# Patient Record
Sex: MALE | Race: WHITE | NOT HISPANIC OR LATINO | Employment: OTHER | URBAN - METROPOLITAN AREA
[De-identification: names, ages, dates, MRNs, and addresses within clinical notes are randomized per-mention and may not be internally consistent; named-entity substitution may affect disease eponyms.]

---

## 2017-02-02 DIAGNOSIS — K21.9 GASTROESOPHAGEAL REFLUX DISEASE WITHOUT ESOPHAGITIS: Primary | ICD-10-CM

## 2017-02-02 NOTE — TELEPHONE ENCOUNTER
pantoprazole (PROTONIX) 20 MG tablet      Last Written Prescription Date: 5-3-2016  Last Fill Quantity: 90,  # refills: 2   Last Office Visit with [facility], [facility] or [facility] prescribing provider: 11-                                         Next 5 appointments (look out 90 days)     Mar 17, 2017 11:40 AM   SHORT with Frannie Talavera MD   [facility]    [address] [place] [id number] [phone]

## 2017-02-03 RX ORDER — PANTOPRAZOLE SODIUM 20 MG/1
TABLET, DELAYED RELEASE ORAL
Qty: 90 TABLET | Refills: 0 | Status: SHIPPED | OUTPATIENT
Start: 2017-02-03 | End: 2017-03-17

## 2017-03-13 ENCOUNTER — RADIANT APPOINTMENT (OUTPATIENT)
Dept: CT IMAGING | Facility: CLINIC | Age: 72
End: 2017-03-13
Attending: FAMILY MEDICINE
Payer: COMMERCIAL

## 2017-03-13 DIAGNOSIS — Z87.891 HISTORY OF TOBACCO USE: ICD-10-CM

## 2017-03-13 PROCEDURE — G0297 LDCT FOR LUNG CA SCREEN: HCPCS | Mod: TC

## 2017-03-17 ENCOUNTER — OFFICE VISIT (OUTPATIENT)
Dept: FAMILY MEDICINE | Facility: CLINIC | Age: 72
End: 2017-03-17
Payer: COMMERCIAL

## 2017-03-17 VITALS
BODY MASS INDEX: 29.18 KG/M2 | HEIGHT: 69 IN | TEMPERATURE: 98 F | WEIGHT: 197 LBS | DIASTOLIC BLOOD PRESSURE: 76 MMHG | SYSTOLIC BLOOD PRESSURE: 136 MMHG | HEART RATE: 72 BPM

## 2017-03-17 DIAGNOSIS — K21.9 GASTROESOPHAGEAL REFLUX DISEASE WITHOUT ESOPHAGITIS: ICD-10-CM

## 2017-03-17 DIAGNOSIS — M47.22 OSTEOARTHRITIS OF SPINE WITH RADICULOPATHY, CERVICAL REGION: ICD-10-CM

## 2017-03-17 DIAGNOSIS — I10 HYPERTENSION GOAL BP (BLOOD PRESSURE) < 140/90: ICD-10-CM

## 2017-03-17 DIAGNOSIS — G47.30 SLEEP APNEA, UNSPECIFIED TYPE: Primary | ICD-10-CM

## 2017-03-17 DIAGNOSIS — M47.812 SPONDYLOSIS OF CERVICAL REGION WITHOUT MYELOPATHY OR RADICULOPATHY: ICD-10-CM

## 2017-03-17 DIAGNOSIS — Z13.6 CARDIOVASCULAR SCREENING; LDL GOAL LESS THAN 160: ICD-10-CM

## 2017-03-17 PROCEDURE — 99214 OFFICE O/P EST MOD 30 MIN: CPT | Performed by: FAMILY MEDICINE

## 2017-03-17 PROCEDURE — 80048 BASIC METABOLIC PNL TOTAL CA: CPT | Performed by: FAMILY MEDICINE

## 2017-03-17 PROCEDURE — 80061 LIPID PANEL: CPT | Performed by: FAMILY MEDICINE

## 2017-03-17 PROCEDURE — 36415 COLL VENOUS BLD VENIPUNCTURE: CPT | Performed by: FAMILY MEDICINE

## 2017-03-17 RX ORDER — PANTOPRAZOLE SODIUM 20 MG/1
TABLET, DELAYED RELEASE ORAL
Qty: 90 TABLET | Refills: 3 | Status: SHIPPED | OUTPATIENT
Start: 2017-03-17 | End: 2017-05-01 | Stop reason: ALTCHOICE

## 2017-03-17 NOTE — MR AVS SNAPSHOT
After Visit Summary   3/17/2017    Garrett [place]    MRN: [id number]           Patient Information     Date Of Birth          1945        Visit Information        Provider Department      3/17/2017 11:40 AM Frannie Talavera MD [facility]        Today's Diagnoses     Sleep apnea, unspecified type    -  1    Hypertension goal BP (blood pressure) < 140/90        Spondylosis of cervical region without myelopathy or radiculopathy        CARDIOVASCULAR SCREENING; LDL GOAL LESS THAN 160          Care Instructions    Please schedule an appointment with ENT Dr. Diamond to discuss surgical options for sleep apnea management.    I would agree with using the naproxen when your neck symptoms flare, and then trying to wean off of it between flares.    Please let me know if you would like to see our chiropractor at our [place] office.    [facility]   Discharged by : Cuca UREÑA, Certified Medical Assistant (AAMA)March 17, 2017 12:48 PM    Paper scripts provided to patient : none   If you have any questions regarding to your visit please contact your care team:   Team Gold Clinic Hours Telephone Number   Dr. Cuca Bradford PA-C   7am-7pm Monday - Thursday   7am-5pm Fridays  [phone]   (Appointment scheduling available 24/7)   RN Line   [phone] option 2     What options do I have for visits at the clinic other than the traditional office visit?   To expand how we care for you, many of our providers are utilizing electronic visits (e-visits) and telephone visits, when medically appropriate, for interactions with their patients rather than a visit in the clinic. We also offer nurse visits for many medical concerns. Just like any other service, we will bill your insurance company for this type of visit based on time spent on the phone with your provider. Not all insurance companies cover these visits. Please check  with your medical insurance if this type of visit is covered. You will be responsible for any charges that are not paid by your insurance.   E-visits via OrthobondharInvenSense: generally incur a $35.00 fee.   Telephone visits:   Time spent on the phone: *charged based on time that is spent on the phone in increments of 10 minutes. Estimated cost:   5-10 mins $30.00   11-20 mins. $59.00   21-30 mins. $85.00   Use AccessPay (secure email communication and access to your chart) to send your primary care provider a message or make an appointment. Ask someone on your Team how to sign up for AccessPay.   For a Price Quote for your services, please call our MadeiraCloud Line at [phone].   As always, Thank you for trusting us with your health care needs!                    [facility]:    Scheduling Appointments  [facility]  Call: [phone]    [facility]  Call: [phone]    [facility]  Call: [phone]    WHERE TO GO FOR CARE?    Clinic    Make an appointment if you:       Are sick (cold, cough, flu, sore throat, earache or in pain).       Have a small injury (sprain, small cut, burn or broken bone).       Need a physical exam, Pap smear, vaccine or prescription refill.       Have questions about your health or medicines.    To reach us:      Call [phone] ([phone]). Open 24 hours every day. (For counseling services, call [phone].)    Log into AccessPay at [email or web address]. (Visit [email or web address] to create an account.) Hospital emergency room    An emergency is a serious or life- threatening problem that must be treated right away.    Call [phone] or get to the hospital if you have:      Very bad or sudden:            - Chest pain or pressure         - Bleeding         - Head or belly pain         - Dizziness or trouble seeing, walking or                          Speaking      Problems breathing      Blood in  your vomit or you are coughing up blood      A major injury (knocked out, loss of a finger or limb, rape, broken bone protruding from skin)    A mental health crisis. (Or call the Mental Health Crisis line at 1-468.849.1146 or Suicide Prevention Hotline at 1-357.757.7417.)    Open 24 hours every day. You don't need an appointment.     Urgent care    Visit urgent care for sickness or small injuries when the clinic is closed. You don't need an appointment. To check hours or find an urgent care near you, visit [email or web address]. Online care    Get online care from [employer] for more than 70 common problems, like colds, allergies and infections. Open 24 hours every day at: [email or web address]   Need help deciding?    For advice about where to be seen, you may call your clinic and ask to speak with a nurse. We're here for you 24 hours every day.         If you are deaf or hard of hearing, please let us know. We provide many free services including sign language interpreters, oral interpreters, TTYs, telephone amplifiers, note takers and written materials.               Follow-ups after your visit        Additional Services     OTOLARYNGOLOGY REFERRAL       Your provider has referred you to: [facility]: Adult [facility] N[facility] Throat Clinic (Otolaryngology) - [place] [phone]  [email or web address]  Dr. Diamond    Please be aware that coverage of these services is subject to the terms and limitations of your health insurance plan.  Call member services at your health plan with any benefit or coverage questions.      Please bring the following with you to your appointment:    (1) Any X-Rays, CTs or MRIs which have been performed.  Contact the facility where they were done to arrange for  prior to your scheduled appointment.   (2) List of current medications  (3) This referral request   (4) Any documents/labs given to you for this referral                  Who to  "contact     If you have questions or need follow up information about today's clinic visit or your schedule please contact [facility] directly at [phone].  Normal or non-critical lab and imaging results will be communicated to you by MyChart, letter or phone within 4 business days after the clinic has received the results. If you do not hear from us within 7 days, please contact the clinic through MyChart or phone. If you have a critical or abnormal lab result, we will notify you by phone as soon as possible.  Submit refill requests through Avaz or call your pharmacy and they will forward the refill request to us. Please allow 3 business days for your refill to be completed.          Additional Information About Your Visit        Avaz Information     Avaz gives you secure access to your electronic health record. If you see a primary care provider, you can also send messages to your care team and make appointments. If you have questions, please call your primary care clinic.  If you do not have a primary care provider, please call [phone] and they will assist you.        Care EveryWhere ID     This is your Care EveryWhere ID. This could be used by other organizations to access your [facility] medical records  [id number]        Your Vitals Were     Pulse Temperature Height BMI (Body Mass Index)          72 98  F (36.7  C) (Oral) 5' 9\" (1.753 m) 29.09 kg/m2         Blood Pressure from Last 3 Encounters:   03/17/17 136/76   11/23/16 130/72   08/05/16 128/74    Weight from Last 3 Encounters:   03/17/17 197 lb (89.4 kg)   11/23/16 196 lb (88.9 kg)   08/05/16 190 lb (86.2 kg)              We Performed the Following     BASIC METABOLIC PANEL     Lipid panel reflex to direct LDL     OTOLARYNGOLOGY REFERRAL        Primary Care Provider Office Phone # Fax #    Frannie Talavera [phone]       [facility] [address] [place] [address]      "   Thank you!     Thank you for choosing [facility]  for your care. Our goal is always to provide you with excellent care. Hearing back from our patients is one way we can continue to improve our services. Please take a few minutes to complete the written survey that you may receive in the mail after your visit with us. Thank you!             Your Updated Medication List - Protect others around you: Learn how to safely use, store and throw away your medicines at www.disposemymeds.org.          This list is accurate as of: 3/17/17 12:48 PM.  Always use your most recent med list.                   Brand Name Dispense Instructions for use    * aspirin 81 MG tablet      Take 1 tablet by mouth At Bedtime       * ASPIRIN PO      Take 325 mg by mouth daily 2 tablets daily for arthritis       Calcium-Magnesium-Zinc-D3 Tabs      Take 1 tablet by mouth 2 times daily       EPINEPHrine 0.3 MG/0.3ML injection    EPIPEN 2-NETO    2 each    Inject 0.3 mLs into the muscle once as needed for anaphylaxis for 1 dose.       ibuprofen 800 MG tablet    ADVIL/MOTRIN     Take 800 mg by mouth every 8 hours as needed.       naproxen 375 MG tablet    NAPROSYN    180 tablet    Take 1 tablet (375 mg) by mouth 2 times daily (with meals)       pantoprazole 20 MG EC tablet    PROTONIX    90 tablet    Take by mouth 30-60 minutes before a meal.       RESVERATROL PO      Take by mouth daily       VITAMIN C CR PO      Take  by mouth.       * Notice:  This list has 2 medication(s) that are the same as other medications prescribed for you. Read the directions carefully, and ask your doctor or other care provider to review them with you.

## 2017-03-17 NOTE — PATIENT INSTRUCTIONS
Please schedule an appointment with ENT Dr. Diamond to discuss surgical options for sleep apnea management.    I would agree with using the naproxen when your neck symptoms flare, and then trying to wean off of it between flares.    Please let me know if you would like to see our chiropractor at our [place] office.    [facility]   Discharged by : Cuca UREÑA, Certified Medical Assistant (AAMA)March 17, 2017 12:48 PM    Paper scripts provided to patient : none   If you have any questions regarding to your visit please contact your care team:   Team Gold Clinic Hours Telephone Number   Dr. Cuca Bradford, JOANN   7am-7pm Monday - Thursday   7am-5pm Fridays  [phone]   (Appointment scheduling available 24/7)   RN Line   [phone] option 2     What options do I have for visits at the clinic other than the traditional office visit?   To expand how we care for you, many of our providers are utilizing electronic visits (e-visits) and telephone visits, when medically appropriate, for interactions with their patients rather than a visit in the clinic. We also offer nurse visits for many medical concerns. Just like any other service, we will bill your insurance company for this type of visit based on time spent on the phone with your provider. Not all insurance companies cover these visits. Please check with your medical insurance if this type of visit is covered. You will be responsible for any charges that are not paid by your insurance.   E-visits via scanR: generally incur a $35.00 fee.   Telephone visits:   Time spent on the phone: *charged based on time that is spent on the phone in increments of 10 minutes. Estimated cost:   5-10 mins $30.00   11-20 mins. $59.00   21-30 mins. $85.00   Use scanR (secure email communication and access to your chart) to send your primary care provider a message or make an appointment. Ask someone on your Team how to  sign up for [employer].   For a Price Quote for your services, please call our Consumer Price Line at [phone].   As always, Thank you for trusting us with your health care needs!                    [facility]:    Scheduling Appointments  [facility]  Call: [phone]    [facility]  Call: [phone]    [facility]  Call: [phone]    WHERE TO GO FOR CARE?    Clinic    Make an appointment if you:       Are sick (cold, cough, flu, sore throat, earache or in pain).       Have a small injury (sprain, small cut, burn or broken bone).       Need a physical exam, Pap smear, vaccine or prescription refill.       Have questions about your health or medicines.    To reach us:      Call [phone] ([phone]). Open 24 hours every day. (For counseling services, call [phone].)    Log into [employer] at LaTherm. (Visit Odyssey Thera.Argos Risk to create an account.) Hospital emergency room    An emergency is a serious or life- threatening problem that must be treated right away.    Call [phone] or get to the hospital if you have:      Very bad or sudden:            - Chest pain or pressure         - Bleeding         - Head or belly pain         - Dizziness or trouble seeing, walking or                          Speaking      Problems breathing      Blood in your vomit or you are coughing up blood      A major injury (knocked out, loss of a finger or limb, rape, broken bone protruding from skin)    A mental health crisis. (Or call the Mental Health Crisis line at 1-524.566.7630 or Suicide Prevention Hotline at 1-143.544.9254.)    Open 24 hours every day. You don't need an appointment.     Urgent care    Visit urgent care for sickness or small injuries when the clinic is closed. You don't need an appointment. To check hours or find an urgent care near you, visit [email or web address]. Online care    Get online care from  [facility] for more than 70 common problems, like colds, allergies and infections. Open 24 hours every day at: [email or web address]   Need help deciding?    For advice about where to be seen, you may call your clinic and ask to speak with a nurse. We're here for you 24 hours every day.         If you are deaf or hard of hearing, please let us know. We provide many free services including sign language interpreters, oral interpreters, TTYs, telephone amplifiers, note takers and written materials.

## 2017-03-17 NOTE — NURSING NOTE
"Chief Complaint   Patient presents with     Follow Up For     spine arthritis     Due for Health Maintenance       Initial /76 (BP Location: Left arm, Patient Position: Chair, Cuff Size: Adult Large)  Pulse 72  Temp 98  F (36.7  C) (Oral)  Ht 5' 9\" (1.753 m)  Wt 197 lb (89.4 kg)  BMI 29.09 kg/m2 Estimated body mass index is 29.09 kg/(m^2) as calculated from the following:    Height as of this encounter: 5' 9\" (1.753 m).    Weight as of this encounter: 197 lb (89.4 kg).  Medication Reconciliation: complete   Barbra Prado MA      "

## 2017-03-17 NOTE — PROGRESS NOTES
"  SUBJECTIVE:                                                    Garrett Del [place] is a 71 year old male who presents to clinic today for the following health issues:      Hypertension Follow-up      Outpatient blood pressures are being checked at home.  Results are \"not so good lately\" All over the board. Last couple days in AM after breakfast 162. And an hour later about 126. He reports that the meter at home reads 10 higher.    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 Degenerative changes in the posterior apophyseal joints  bilaterally in the mid and lower cervical spine. Calcification of the  ligamentum nuchae. No acute findings.     Saw Sanchez Kent 11/23/16 for neck pain with occasional tension headaches. X-ray done, see above, and patient referred to PT, which he completed 12/2016. Patient says PT didn't help. Sanchez prescribed him 30 tablets of naproxen 375 mg bid which he is still taking, with relief.    He has also gone to a chiropractor, prior to being seen by Sanchez, and states \"he pretty much fixed it.\" Has not had significant pain in his neck since his chiropractic adjustments. Patient reports before his neck pain began he was dealing with lightheadedness when he got up in the AM at times, \"it felt like I was pinching a nerve,\" and he thought this was connected to his neck issue. His chiropractor fixed this as well but it has started to come back slightly, not daily. The light headedness lasts for about 10-15 minutes and then subsides.     Fatigue  Wife is still concerned " "about his fatigue, although we have not been able to determine an explanation for his fatigue other than sleep apnea. His cpap machine is bothersome to him such that he feels it wakes him up at night and prevents good sleep. 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 He has a CPAP that he uses nightly which he says does impact his sleep and he still wakes up feeling tired. His machine was last interrogated a year ago. Discussed surgical treatments for ADOLFO and patient would like a referral to discuss these.    Due for labs, not fasting.   Chest CT 3/13/17 was unremarkable and recommended repeat scan in 12 months. Patient quit smoking 20+ years ago.    Problem list and histories reviewed & adjusted, as indicated.  Additional history: as documented    Patient Active Problem List   Diagnosis     GERD (gastroesophageal reflux disease)     Esophageal spasms     CARDIOVASCULAR SCREENING; LDL GOAL LESS THAN 160     Hypertension goal BP (blood pressure) < 140/90     MEDIAL MENISCUS TEAR - left     OA (OSTEOARTHRITIS) OF KNEE - left     Prostate cancer screening     Sleep apnea     Kidney stone     Mantoux: positive     Advanced directives, counseling/discussion     Hiatal hernia     Colon polyp     Past Surgical History   Procedure Laterality Date     No history of surgery       Cardiac stress tst,complete  2006     Hc knee scope,med/lat menisectomy  11/11/11     left, with partial medial menisectomy ONLY       Social History   Substance Use Topics     Smoking status: Former Smoker     Packs/day: 1.00     Years: 35.00     Types: Cigarettes, Cigars, Pipe     Start date: 6/6/1963     Quit date: 10/13/1998     Smokeless tobacco: Never Used     Alcohol use Yes      Comment: about a beer a week      Family History   Problem Relation Age of Onset     Prostate Cancer Father      C.A.D. Father      DIABETES Paternal Grandfather      Hypertension No family hx of      CANCER No family " "hx of          Current Outpatient Prescriptions   Medication Sig Dispense Refill     RESVERATROL PO Take by mouth daily       pantoprazole (PROTONIX) 20 MG EC tablet Take by mouth 30-60 minutes before a meal. 90 tablet 0     naproxen (NAPROSYN) 375 MG tablet Take 1 tablet (375 mg) by mouth 2 times daily (with meals) 180 tablet 0     ASPIRIN PO Take 325 mg by mouth daily 2 tablets daily for arthritis       Multiple Minerals-Vitamins (CALCIUM-MAGNESIUM-ZINC-D3) TABS Take 1 tablet by mouth 2 times daily       ibuprofen (ADVIL,MOTRIN) 800 MG tablet Take 800 mg by mouth every 8 hours as needed.       EPINEPHrine (EPIPEN 2-NETO) 0.3 MG/0.3ML injection Inject 0.3 mLs into the muscle once as needed for anaphylaxis for 1 dose. 2 each 3     Ascorbic Acid (VITAMIN C CR PO) Take  by mouth.       aspirin 81 MG tablet Take 1 tablet by mouth At Bedtime        Allergies   Allergen Reactions     Septra [Sulfamethoxazole W-Trimethoprim]        Reviewed and updated as needed this visit by clinical staff  Tobacco  Allergies  Meds  Med Hx  Surg Hx  Fam Hx  Soc Hx      Reviewed and updated as needed this visit by Provider         ROS:  Constitutional, HEENT, cardiovascular, pulmonary, gi and gu systems are negative, except as otherwise noted.    This document serves as a record of the services and decisions personally performed by Frannie Talavera MD. It was created on his/her behalf by Noemi Mata, a trained medical scribe. The creation of this document is based on the provider's statements to the medical scribe. Noemi Mata March 17, 2017 11:55 AM  OBJECTIVE:                                                    /76 (BP Location: Left arm, Patient Position: Chair, Cuff Size: Adult Large)  Pulse 72  Temp 98  F (36.7  C) (Oral)  Ht 5' 9\" (1.753 m)  Wt 197 lb (89.4 kg)  BMI 29.09 kg/m2  Body mass index is 29.09 kg/(m^2).  GENERAL: healthy, alert and no distress  RESP: lungs clear to auscultation - no rales, rhonchi or " wheezes  CV: regular rate and rhythm, normal S1 S2, no S3 or S4, no murmur, click or rub, no peripheral edema and peripheral pulses strong  PSYCH: mentation appears normal, affect normal/bright    Diagnostic Test Results:  No results found for this or any previous visit (from the past 24 hour(s)).     ASSESSMENT/PLAN:                                                    (G47.30) Sleep apnea, unspecified type  (primary encounter diagnosis)  Comment: Not tolerating CPAP well. Interested in other treatments.   Plan: OTOLARYNGOLOGY REFERRAL        F/u with ENT    (I10) Hypertension goal BP (blood pressure) < 140/90  Comment: Well controlled  Plan: BASIC METABOLIC PANEL        Continue current medications.    (M47.812) Spondylosis of cervical region without myelopathy or radiculopathy  Comment: Pain is controlled with naproxen and chiropractic adjustments  Plan: Refills provided. Advised he try to wean off of naproxen between flares and continue chiropractic care    (Z13.6) CARDIOVASCULAR SCREENING; LDL GOAL LESS THAN 160  Comment: Not fasting  Plan: Lipid panel reflex to direct LDL        Adjust treatment based on labs.      Patient Instructions   Please schedule an appointment with ENT Dr. Diamond to discuss surgical options for sleep apnea management.    I would agree with using the naproxen when your neck symptoms flare, and then trying to wean off of it between flares.    Please let me know if you would like to see our chiropractor at our [place] office.      The information in this document, created by the medical scribанна Mata for me, accurately reflects the services I personally performed and the decisions made by me. I have reviewed and approved this document for accuracy prior to leaving the patient care area.    Frannie Talavera MD  [facility]

## 2017-03-18 LAB
ANION GAP SERPL CALCULATED.3IONS-SCNC: 7 MMOL/L (ref 3–14)
BUN SERPL-MCNC: 11 MG/DL (ref 7–30)
CALCIUM SERPL-MCNC: 9.6 MG/DL (ref 8.5–10.1)
CHLORIDE SERPL-SCNC: 104 MMOL/L (ref 94–109)
CHOLEST SERPL-MCNC: 175 MG/DL
CO2 SERPL-SCNC: 29 MMOL/L (ref 20–32)
CREAT SERPL-MCNC: 1.03 MG/DL (ref 0.66–1.25)
GFR SERPL CREATININE-BSD FRML MDRD: 71 ML/MIN/1.7M2
GLUCOSE SERPL-MCNC: 76 MG/DL (ref 70–99)
HDLC SERPL-MCNC: 28 MG/DL
LDLC SERPL CALC-MCNC: 78 MG/DL
NONHDLC SERPL-MCNC: 147 MG/DL
POTASSIUM SERPL-SCNC: 4.9 MMOL/L (ref 3.4–5.3)
SODIUM SERPL-SCNC: 140 MMOL/L (ref 133–144)
TRIGL SERPL-MCNC: 343 MG/DL

## 2017-05-01 ENCOUNTER — TELEPHONE (OUTPATIENT)
Dept: FAMILY MEDICINE | Facility: CLINIC | Age: 72
End: 2017-05-01

## 2017-05-01 DIAGNOSIS — K21.9 GASTROESOPHAGEAL REFLUX DISEASE WITHOUT ESOPHAGITIS: Primary | ICD-10-CM

## 2017-05-01 NOTE — TELEPHONE ENCOUNTER
Relayed message, he will see if it is effective & let us know if it doesn't help.   Lindsey Grigsby RN

## 2017-05-01 NOTE — TELEPHONE ENCOUNTER
We were using protonix because other medications did not work.  So [facility] but my bigger concern is that this has been a change.  So I would be quick to suggest follow up if prilosec doesn't help.

## 2017-05-01 NOTE — TELEPHONE ENCOUNTER
Reason for Call:  Medication or medication refill:    Do you use a [facility]?  Name of the pharmacy and phone number for the current request:  [facility], pended    Name of the medication requested: acid reflux    Other request: patients pantoprazole (PROTONIX) 20 MG is not longer effective in helping with the acid reflux.  Would like provider to contact insurance to approve something that has worked in the past for him: Pravised or Prilosec.  Please call to advise.    Can we leave a detailed message on this number? YES    Phone number patient can be reached at: Cell number on file:    Telephone Information:   Mobile [phone]       Best Time: any time before 1:45, is a  and cannot answer calls when driving.    Call taken on 5/1/2017 at 10:17 AM by Marissa Hernandez

## 2017-05-01 NOTE — TELEPHONE ENCOUNTER
Seen 3/27 but patient did not note this complaint. What type of visit would you prefer?  Lindsey Grigsby RN

## 2017-06-07 ENCOUNTER — TELEPHONE (OUTPATIENT)
Dept: FAMILY MEDICINE | Facility: CLINIC | Age: 72
End: 2017-06-07

## 2017-06-07 ENCOUNTER — OFFICE VISIT (OUTPATIENT)
Dept: FAMILY MEDICINE | Facility: CLINIC | Age: 72
End: 2017-06-07
Payer: COMMERCIAL

## 2017-06-07 VITALS
TEMPERATURE: 98.2 F | DIASTOLIC BLOOD PRESSURE: 70 MMHG | BODY MASS INDEX: 28.58 KG/M2 | HEART RATE: 88 BPM | HEIGHT: 69 IN | WEIGHT: 193 LBS | SYSTOLIC BLOOD PRESSURE: 138 MMHG

## 2017-06-07 DIAGNOSIS — R10.13 ABDOMINAL PAIN, EPIGASTRIC: Primary | ICD-10-CM

## 2017-06-07 DIAGNOSIS — K21.9 GASTROESOPHAGEAL REFLUX DISEASE WITHOUT ESOPHAGITIS: ICD-10-CM

## 2017-06-07 DIAGNOSIS — K22.70 BARRETT'S ESOPHAGUS WITHOUT DYSPLASIA: ICD-10-CM

## 2017-06-07 LAB
ALBUMIN SERPL-MCNC: 4.3 G/DL (ref 3.4–5)
ALP SERPL-CCNC: 65 U/L (ref 40–150)
ALT SERPL W P-5'-P-CCNC: 69 U/L (ref 0–70)
ANION GAP SERPL CALCULATED.3IONS-SCNC: 5 MMOL/L (ref 3–14)
AST SERPL W P-5'-P-CCNC: 46 U/L (ref 0–45)
BILIRUB SERPL-MCNC: 1.1 MG/DL (ref 0.2–1.3)
BUN SERPL-MCNC: 12 MG/DL (ref 7–30)
CALCIUM SERPL-MCNC: 9.3 MG/DL (ref 8.5–10.1)
CHLORIDE SERPL-SCNC: 104 MMOL/L (ref 94–109)
CO2 SERPL-SCNC: 30 MMOL/L (ref 20–32)
CREAT SERPL-MCNC: 0.94 MG/DL (ref 0.66–1.25)
ERYTHROCYTE [DISTWIDTH] IN BLOOD BY AUTOMATED COUNT: 13.8 % (ref 10–15)
GFR SERPL CREATININE-BSD FRML MDRD: 79 ML/MIN/1.7M2
GLUCOSE SERPL-MCNC: 121 MG/DL (ref 70–99)
HCT VFR BLD AUTO: 44.5 % (ref 40–53)
HGB BLD-MCNC: 15.7 G/DL (ref 13.3–17.7)
MCH RBC QN AUTO: 31.5 PG (ref 26.5–33)
MCHC RBC AUTO-ENTMCNC: 35.3 G/DL (ref 31.5–36.5)
MCV RBC AUTO: 89 FL (ref 78–100)
PLATELET # BLD AUTO: 173 10E9/L (ref 150–450)
POTASSIUM SERPL-SCNC: 4.6 MMOL/L (ref 3.4–5.3)
PROT SERPL-MCNC: 7.9 G/DL (ref 6.8–8.8)
RBC # BLD AUTO: 4.99 10E12/L (ref 4.4–5.9)
SODIUM SERPL-SCNC: 139 MMOL/L (ref 133–144)
WBC # BLD AUTO: 8.7 10E9/L (ref 4–11)

## 2017-06-07 PROCEDURE — 85027 COMPLETE CBC AUTOMATED: CPT | Performed by: FAMILY MEDICINE

## 2017-06-07 PROCEDURE — 36415 COLL VENOUS BLD VENIPUNCTURE: CPT | Performed by: FAMILY MEDICINE

## 2017-06-07 PROCEDURE — 80053 COMPREHEN METABOLIC PANEL: CPT | Performed by: FAMILY MEDICINE

## 2017-06-07 PROCEDURE — 99214 OFFICE O/P EST MOD 30 MIN: CPT | Performed by: FAMILY MEDICINE

## 2017-06-07 NOTE — NURSING NOTE
"Chief Complaint   Patient presents with     Abdominal Pain       Initial /70 (Cuff Size: Adult Regular)  Pulse 88  Temp 98.2  F (36.8  C) (Oral)  Ht 5' 9\" (1.753 m)  Wt 193 lb (87.5 kg)  BMI 28.5 kg/m2 Estimated body mass index is 28.5 kg/(m^2) as calculated from the following:    Height as of this encounter: 5' 9\" (1.753 m).    Weight as of this encounter: 193 lb (87.5 kg).  Medication Reconciliation: complete   Magaly Cha, Certified Medical Assistant (AAMA)     "

## 2017-06-07 NOTE — PROGRESS NOTES
"  SUBJECTIVE:                                                    Garrett Del Rio is a 71 year old male who presents to clinic today for the following health issues:      ABDOMINAL PAIN     Onset: 4-5 days    Description:   Character: Cramping  Location: epigastric region  Radiation: out towards sides    Intensity: moderate, severe    Progression of Symptoms:  worsening and intermittent    Accompanying Signs & Symptoms:  Fever/Chills?: no   Gas/Bloating: YES  Nausea: YES, mild symptoms  Vomitting: no   Diarrhea?: YES, somewhat   Constipation:no   Dysuria or Hematuria: no    History:   Trauma: no   Previous similar pain: no    Previous tests done: Upper Endoscopy    Precipitating factors:   Does the pain change with:     Food: no      BM: no     Urination: no     Alleviating factors:  rest    Therapies Tried and outcome: none    LMP:  not applicable     Discomfort primarily localized to epigastric region without radiation to the chest. Additionally, he notes worsening symptoms since onset. Denies dysphagia or worsening symptoms with exertion or positional changes.Denies lightheadedness, dizziness, or shortness of breath. Likewise, denies melena or hematochezia. Present symptoms not recrudescent of past episode of kidney stones. He does state that his omeprazole was changed three weeks ago from pantoprazole. Patient doesn't take NSAID's, except for aspiring 81 mg. No other OTC medications. He did have an abdominal US done about a month ago - there were no concerning findings at that time. He does still have his gallbladder. No dysuria or hematuria. No recent abx use. 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     Increase omeprazole twice a day. Repeat upper endoscopy would also be worthwhile given indication of possible Barretts fronm previous endoscopy.     Past/recent records reviewed and discussed for -- medications.          Problem " "list and histories reviewed & adjusted, as indicated.  Additional history: as documented    Labs reviewed in EPIC    Reviewed and updated as needed this visit by clinical staff  Tobacco  Allergies  Med Hx  Surg Hx  Fam Hx  Soc Hx      Reviewed and updated as needed this visit by Provider         ROS:  Constitutional, HEENT, cardiovascular, pulmonary, GI, , musculoskeletal, neuro, skin, endocrine and psych systems are negative, except as otherwise noted.    This document serves as a record of the services and decisions personally performed and made by Bal Echeverria MD. It was created on their behalf by Julio C Magaña, a trained medical scribe. The creation of this document is based the provider's statements to the medical scribe.  Julio C Magaña June 7, 2017 12:36 PM         OBJECTIVE:                                                    /70 (Cuff Size: Adult Regular)  Pulse 88  Temp 98.2  F (36.8  C) (Oral)  Ht 1.753 m (5' 9\")  Wt 87.5 kg (193 lb)  BMI 28.5 kg/m2  Body mass index is 28.5 kg/(m^2).  GENERAL: healthy, alert and no distress  HENT: ear canals and TM's normal, nose and mouth without ulcers or lesions  NECK: no adenopathy, no asymmetry, masses, or scars and thyroid normal to palpation  RESP: lungs clear to auscultation - no rales, rhonchi or wheezes  CV: regular rate and rhythm, normal S1 S2, no S3 or S4, no murmur, click or rub, no peripheral edema   ABDOMEN: soft, no hepatosplenomegaly, no masses and bowel sounds normal -- mild epigastric tenderness  MS: no gross musculoskeletal defects noted, no edema  SKIN: no suspicious lesions or rashes  PSYCH: mentation appears normal, affect normal/bright    Diagnostic Test Results:  none      ASSESSMENT/PLAN:                                                    (R10.13) Abdominal pain, epigastric  (primary encounter diagnosis)  Comment: he had mild epigastric tenderness, which improved with GI cocktail - I'll treat as GERD. He did have an upper endoscopy " done in 2013, which showed evidence of gastric polyp, small hiatal hernia, and possible Barretts  Plan: lidocaine 15 mL, alum & mag         hydroxide-simethicone 15 mL GI Cocktail,         Comprehensive metabolic panel (BMP + Alb, Alk         Phos, ALT, AST, Total. Bili, TP), CBC with         platelets, H Pylori antigen, stool,         GASTROENTEROLOGY ADULT REF PROCEDURE ONLY        -take omeprazole BID        -repeat upper endoscopy due to possible Barretts    (K21.9) Gastroesophageal reflux disease without esophagitis  Comment: see comments above  Plan: Comprehensive metabolic panel (BMP + Alb, Alk         Phos, ALT, AST, Total. 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 Bili, TP)     CBC with platelets     Last Lab Result: Hemoglobin (g/dL)       Date                     Value                 08/05/2016               15.9             ----------     H Pylori antigen, stool     Standing Status:   Future     Standing Expiration Date:   7/7/2017     GASTROENTEROLOGY ADULT REF PROCEDURE ONLY     lidocaine 15 mL, alum & mag hydroxide-simethicone 15 mL GI Cocktail     Sig: Take 30 mLs by mouth once for 1 dose     Dispense:  30 mL     Refill:  0     Increase Prilosec 2 times per day    [facility]:  [phone]          The information in this document, created by the medical scribe for me, accurately reflects the services I personally performed and the decisions made by me. I have reviewed and approved this document for accuracy prior to  leaving the patient care area.    James Echeverria MD, MD  [facility]

## 2017-06-07 NOTE — PATIENT INSTRUCTIONS
Orders Placed This Encounter     Comprehensive metabolic panel (BMP + Alb, Alk Phos, ALT, AST, Total. Bili, TP)     CBC with platelets     Last Lab Result: Hemoglobin (g/dL)       Date                     Value                 08/05/2016               15.9             ----------     H Pylori antigen, stool     Standing Status:   Future     Standing Expiration Date:   7/7/2017     GASTROENTEROLOGY ADULT REF PROCEDURE ONLY     lidocaine 15 mL, alum & mag hydroxide-simethicone 15 mL GI Cocktail     Sig: Take 30 mLs by mouth once for 1 dose     Dispense:  30 mL     Refill:  0     Increase Prilosec 2 times per day    [facility]:  [phone]

## 2017-06-07 NOTE — TELEPHONE ENCOUNTER
Spoke with patient. He states that he has been having GERD symptoms for the last 3 days. He would like to see a provider today.  Is currently taking omeprazole.  Appointment scheduled with Dr. Echeverria at 12:20 today.  Joe Singleton RN

## 2017-06-07 NOTE — TELEPHONE ENCOUNTER
Reason for Call:  Other appointment    Detailed comments: Patient would like to be seen with primary doctor for Gerd. Stated that his is having severe stomach pain    Phone Number Patient can be reached at: Cell number on file:    Telephone Information:   Mobile [phone]       Best Time:     Can we leave a detailed message on this number? Not Applicable    Call taken on 6/7/2017 at 9:30 AM by Kayce Daniel

## 2017-06-07 NOTE — NURSING NOTE
Gave patient GI Cocktail at 1:00pm.  Tolerated it well.    Susan Grigsby CMA    lidocaine 15 mL (lot: [id number], exp: 02/2020, ND: [id number] / [facility]))   alum & mag hydroxide-simethicone 15 mL GI Cocktail ([id number], 07/2017, [id number] / [facility])

## 2017-06-07 NOTE — MR AVS SNAPSHOT
After Visit Summary   6/7/2017    Garrett Del Rio    MRN: [id number]           Patient Information     Date Of Birth          1945        Visit Information        Provider Department      6/7/2017 12:20 PM James Echeverria MD [facility]        Today's Diagnoses     Abdominal pain, epigastric    -  1    Gastroesophageal reflux disease without esophagitis        Buck's esophagus without dysplasia          Care Instructions    Orders Placed This Encounter     Comprehensive metabolic panel (BMP + Alb, Alk Phos, ALT, AST, Total. Bili, TP)     CBC with platelets     Last Lab Result: Hemoglobin (g/dL)       Date                     Value                 08/05/2016               15.9             ----------     H Pylori antigen, stool     Standing Status:   Future     Standing Expiration Date:   7/7/2017     GASTROENTEROLOGY ADULT REF PROCEDURE ONLY     lidocaine 15 mL, alum & mag hydroxide-simethicone 15 mL GI Cocktail     Sig: Take 30 mLs by mouth once for 1 dose     Dispense:  30 mL     Refill:  0     Increase Prilosec 2 times per day    [facility]:  [phone]            Follow-ups after your visit        Additional Services     GASTROENTEROLOGY ADULT REF PROCEDURE ONLY       Last Lab Result: Creatinine (mg/dL)       Date                     Value                 03/17/2017               1.03             ----------  Body mass index is 28.5 kg/(m^2).     Needed:  No  Language:  English    Patient will be contacted to schedule procedure.     Please be aware that coverage of these services is subject to the terms and limitations of your health insurance plan.  Call member services at your health plan with any benefit or coverage questions.  Any procedures must be performed at a [facility] facility OR coordinated by your clinic's referral office.    Please bring the following with you to your appointment:    (1) Any X-Rays, CTs or MRIs which have been  "performed.  Contact the facility where they were done to arrange for  prior to your scheduled appointment.    (2) List of current medications   (3) This referral request   (4) Any documents/labs given to you for this referral                  Future tests that were ordered for you today     Open Future Orders        Priority Expected Expires Ordered    H Pylori antigen, stool Routine  7/7/2017 6/7/2017            Who to contact     If you have questions or need follow up information about today's clinic visit or your schedule please contact [facility] directly at [phone].  Normal or non-critical lab and imaging results will be communicated to you by [employer], letter or phone within 4 business days after the clinic has received the results. If you do not hear from us within 7 days, please contact the clinic through [employer] or phone. If you have a critical or abnormal lab result, we will notify you by phone as soon as possible.  Submit refill requests through [employer] or call your pharmacy and they will forward the refill request to us. Please allow 3 business days for your refill to be completed.          Additional Information About Your Visit        [employer] Information     [employer] gives you secure access to your electronic health record. If you see a primary care provider, you can also send messages to your care team and make appointments. If you have questions, please call your primary care clinic.  If you do not have a primary care provider, please call [phone] and they will assist you.        Care EveryWhere ID     This is your Care EveryWhere ID. This could be used by other organizations to access your [place] medical records  [id number]        Your Vitals Were     Pulse Temperature Height BMI (Body Mass Index)          88 98.2  F (36.8  C) (Oral) 5' 9\" (1.753 m) 28.5 kg/m2         Blood Pressure from Last 3 Encounters:   06/07/17 138/70   03/17/17 136/76   11/23/16 130/72 "    Weight from Last 3 Encounters:   06/07/17 193 lb (87.5 kg)   03/17/17 197 lb (89.4 kg)   11/23/16 196 lb (88.9 kg)              We Performed the Following     CBC with platelets     Comprehensive metabolic panel (BMP + Alb, Alk Phos, ALT, AST, Total. Bili, TP)     GASTROENTEROLOGY ADULT REF PROCEDURE ONLY          Today's Medication Changes          These changes are accurate as of: 6/7/17  1:28 PM.  If you have any questions, ask your nurse or doctor.               Start taking these medicines.        Dose/Directions    lidocaine 15 mL, alum & mag hydroxide-simethicone 15 mL GI Cocktail   Used for:  Abdominal pain, epigastric   Started by:  James Echeverria MD        Dose:  30 mL   Take 30 mLs by mouth once for 1 dose   Quantity:  30 mL   Refills:  0         These medicines have changed or have updated prescriptions.        Dose/Directions    omeprazole 20 MG CR capsule   Commonly known as:  priLOSEC   This may have changed:  when to take this   Used for:  Gastroesophageal reflux disease without esophagitis   Changed by:  James Echeverria MD        Dose:  20 mg   Take 1 capsule (20 mg) by mouth 2 times daily   Quantity:  180 capsule   Refills:  3            Where to get your medicines      Some of these will need a paper prescription and others can be bought over the counter.  Ask your nurse if you have questions.     You don't need a prescription for these medications     lidocaine 15 mL, alum & mag hydroxide-simethicone 15 mL GI Cocktail                Primary Care Provider Office Phone # Fax #    Frannie Maulik Talavera [phone]       [address] [address]        Thank you!     Thank you for choosing [facility]  for your care. Our goal is always to provide you with excellent care. Hearing back from our patients is one way we can continue to improve our services. Please take a few minutes to complete the written  survey that you may receive in the mail after your visit with us. Thank you!             Your Updated Medication List - Protect others around you: Learn how to safely use, store and throw away your medicines at www.disposemymeds.org.          This list is accurate as of: 6/7/17  1:28 PM.  Always use your most recent med list.                   Brand Name Dispense Instructions for use    * aspirin 81 MG tablet      Take 1 tablet by mouth At Bedtime       * ASPIRIN PO      Take 325 mg by mouth daily 2 tablets daily for arthritis       Calcium-Magnesium-Zinc-D3 Tabs      Take 1 tablet by mouth 2 times daily       EPINEPHrine 0.3 MG/0.3ML injection    EPIPEN 2-NETO    2 each    Inject 0.3 mLs into the muscle once as needed for anaphylaxis for 1 dose.       lidocaine 15 mL, alum & mag hydroxide-simethicone 15 mL GI Cocktail     30 mL    Take 30 mLs by mouth once for 1 dose       omeprazole 20 MG CR capsule    priLOSEC    180 capsule    Take 1 capsule (20 mg) by mouth 2 times daily       RESVERATROL PO      Take by mouth daily       VITAMIN C CR PO      Take  by mouth.       * Notice:  This list has 2 medication(s) that are the same as other medications prescribed for you. Read the directions carefully, and ask your doctor or other care provider to review them with you.

## 2017-06-08 ASSESSMENT — PATIENT HEALTH QUESTIONNAIRE - PHQ9: SUM OF ALL RESPONSES TO PHQ QUESTIONS 1-9: 10

## 2017-06-09 DIAGNOSIS — K21.9 GASTROESOPHAGEAL REFLUX DISEASE WITHOUT ESOPHAGITIS: ICD-10-CM

## 2017-06-09 DIAGNOSIS — R10.13 ABDOMINAL PAIN, EPIGASTRIC: ICD-10-CM

## 2017-06-09 PROCEDURE — 87338 HPYLORI STOOL AG IA: CPT | Performed by: FAMILY MEDICINE

## 2017-06-12 LAB
H PYLORI AG STL QL IA: NORMAL
MICRO REPORT STATUS: NORMAL
SPECIMEN SOURCE: NORMAL

## 2017-07-03 ENCOUNTER — TELEPHONE (OUTPATIENT)
Dept: FAMILY MEDICINE | Facility: CLINIC | Age: 72
End: 2017-07-03

## 2017-07-03 DIAGNOSIS — K21.9 GASTROESOPHAGEAL REFLUX DISEASE WITHOUT ESOPHAGITIS: ICD-10-CM

## 2017-07-03 NOTE — TELEPHONE ENCOUNTER
Pharmacy Comments:  PATIENT STATES DOSE CHANGED TO BID DOSING.  PLEASE CLARIFY AND RESEND.    omeprazole (PRILOSEC) 20 MG CR capsule   20 mg, 2 TIMES DAILY 3 ordered  EditCancel Reorder     Summary: Take 1 capsule (20 mg) by mouth 2 times daily   Dose, Route, Frequency: 20 mg, Oral, 2 TIMES DAILY  Start: 6/7/2017  Ord/Sold: 6/7/2017 (O)  Report  Taking:   Long-term:   Pharmacy: [facility] [address] [place] [address] [id number] [facility] Dose History       Patient Sig: Take 1 capsule (20 mg) by mouth 2 times daily       Ordered on: 6/7/2017       Authorized by: PEARL ALSTON       Dispense: 180 capsule          Last Office Visit with G, JOSE ROBERTOP or [facility] prescribing provider: 6-7-2017  Future Office visit:       Routing refill request to provider for review/approval because:  Medication is reported/historical

## 2017-07-05 NOTE — TELEPHONE ENCOUNTER
It had been changed to BID. Reordered. He was also given a referral to GI for repeat of EGD. Please review with patient if he has made the appointment.     Orders Placed This Encounter     omeprazole (PRILOSEC) 20 MG CR capsule     Sig: Take 1 capsule (20 mg) by mouth 2 times daily     Dispense:  180 capsule     Refill:  3

## 2017-07-06 NOTE — TELEPHONE ENCOUNTER
He is scheduled for endoscopy on 7/11. He states there was trouble with the referral as they didn't receive anything but it is straightened out now.  Lindsey Grigsby RN

## 2017-07-10 ENCOUNTER — TELEPHONE (OUTPATIENT)
Dept: FAMILY MEDICINE | Facility: CLINIC | Age: 72
End: 2017-07-10

## 2017-07-10 NOTE — TELEPHONE ENCOUNTER
Reason for Call:  Form, our goal is to have forms completed with 72 hours, however, some forms may require a visit or additional information.  Patient was referred to gastro by James Echeverria MD   Type of letter, form or note:  medical        Additional comments: Noah from  [facility] is requesting notes and labs of  the patient, he  has an appointment with them tomorrow at 10 am and they would also like any radiology info if any please fax to [facility] at [phone]    Call taken on 7/10/2017 at 9:13 AM by Yasmeen Montoya

## 2017-08-11 ENCOUNTER — TRANSFERRED RECORDS (OUTPATIENT)
Dept: HEALTH INFORMATION MANAGEMENT | Facility: CLINIC | Age: 72
End: 2017-08-11

## 2017-08-22 ENCOUNTER — TELEPHONE (OUTPATIENT)
Dept: FAMILY MEDICINE | Facility: CLINIC | Age: 72
End: 2017-08-22

## 2017-08-22 ENCOUNTER — OFFICE VISIT (OUTPATIENT)
Dept: FAMILY MEDICINE | Facility: CLINIC | Age: 72
End: 2017-08-22
Payer: COMMERCIAL

## 2017-08-22 VITALS
DIASTOLIC BLOOD PRESSURE: 72 MMHG | SYSTOLIC BLOOD PRESSURE: 138 MMHG | HEART RATE: 72 BPM | BODY MASS INDEX: 28.62 KG/M2 | HEIGHT: 69 IN | WEIGHT: 193.25 LBS | TEMPERATURE: 98.3 F

## 2017-08-22 DIAGNOSIS — G47.30 SLEEP APNEA, UNSPECIFIED TYPE: ICD-10-CM

## 2017-08-22 DIAGNOSIS — R10.84 ABDOMINAL PAIN, GENERALIZED: Primary | ICD-10-CM

## 2017-08-22 PROCEDURE — 99214 OFFICE O/P EST MOD 30 MIN: CPT | Performed by: FAMILY MEDICINE

## 2017-08-22 NOTE — TELEPHONE ENCOUNTER
I contacted [facility] this morning to speak to Dr. Del Cid but I have never heard back.  I would have simply spoken to a nurse - but I was told they don't have one?    Here is what I need help with.  Dr. Del Cid did Garrett's most recent endoscopy - and recommended another one.  But he was fasting for 10 hours prior to this endoscopy - I am more concerned about slow transit/gastroparesis.  Garrett has already recently had the labs done that Dr. Del Cid was suggesting (CBC and CMP).  So those can be faxed if Dr. Del Cid would like them.   But I would suggest that the patient have a consultation with Dr. Del Cid or one of his colleagues.  His pain is in his lower abdomen. So I don't think a repeat endoscopy will provide the answer.  I would like Garrett to have a consult.  And because his pain has now been occurring for over 3 months, I am wondering how soon they could get him in

## 2017-08-22 NOTE — PROGRESS NOTES
"  SUBJECTIVE:   Garrett Del Rio is a 71 year old male who presents to clinic today for the following health issues:      Patient here for follow up of endoscopy. He states that they are wanting him to come back to redo the endoscopy. Before his first endoscopy he was not told that he should fast before the procedure. The last time he states that he fasted, but they still found food in his stomach and weren't able to continue. He is confused because his pain is in his lower abdominal area, and sometime radiates to his groin area. He states that he has days where the pain is lower, then he states he is \"in the bathroom all day\" he states he stools until he is empty. There are some days that he doesn't go at all, but it is usually every day. He states that the pain is not always there, it comes and goes. Sometimes it is worse than others, and he can't place it to a particular food he ingests. He has the pain almost every day ranging form a 1 to a 5, and comes in random times during the day.      He states he feels gassy and bloated. He doesn't think that increasing his dose of omeprazole has helped his pain.     He is not having any epigastric or radiating chest pain (previously attributed to his GERD). He has received no benefit from increased PPI.    He will be seeing his urologist on Monday.     Sleep:  His wife states that he is very sleepy throughout the day. He states he doesn't sleep well with his CPAP. He states he hasn't seen a sleep specialist since he had the sleep study years ago.     Problem list and histories reviewed & adjusted, as indicated.  Additional history: as documented    Patient Active Problem List   Diagnosis     GERD (gastroesophageal reflux disease)     Esophageal spasms     CARDIOVASCULAR SCREENING; LDL GOAL LESS THAN 160     Hypertension goal BP (blood pressure) < 140/90     MEDIAL MENISCUS TEAR - left     OA (OSTEOARTHRITIS) OF KNEE - left     Prostate cancer screening     Sleep apnea     " "Kidney stone     Mantoux: positive     Advanced directives, counseling/discussion     Hiatal hernia     Colon polyp     Past Surgical History:   Procedure Laterality Date     CARDIAC STRESS TST,COMPLETE  2006     HC KNEE SCOPE,MED/LAT MENISECTOMY  11/11/11    left, with partial medial menisectomy ONLY     NO HISTORY OF SURGERY         Social History   Substance Use Topics     Smoking status: Former Smoker     Packs/day: 1.00     Years: 35.00     Types: Cigarettes, Cigars, Pipe     Start date: 6/6/1963     Quit date: 10/13/1998     Smokeless tobacco: Never Used     Alcohol use Yes      Comment: about a beer a week      Family History   Problem Relation Age of Onset     Prostate Cancer Father      C.A.D. Father      DIABETES Paternal Grandfather      Hypertension No family hx of      CANCER No family hx of          Allergies   Allergen Reactions     Septra [Sulfamethoxazole W-Trimethoprim]      BP Readings from Last 3 Encounters:   08/22/17 138/72   06/07/17 138/70   03/17/17 136/76    Wt Readings from Last 3 Encounters:   08/22/17 87.7 kg (193 lb 4 oz)   06/07/17 87.5 kg (193 lb)   03/17/17 89.4 kg (197 lb)                        Reviewed and updated as needed this visit by clinical staff  Tobacco  Allergies       Reviewed and updated as needed this visit by Provider  Allergies  Meds  Problems         ROS:  Constitutional, HEENT, cardiovascular, pulmonary, gi and gu systems are negative, except as otherwise noted.    This document serves as a record of the services and decisions personally performed by AIME BENTLEY. It was created on his/her behalf by Geraldine Leyva, a trained medical scribe. The creation of this document is based on the provider's statements to the medical scribe. Geraldine Leyva, August 22, 2017 10:51 AM    OBJECTIVE:   /72 (BP Location: Right arm, Patient Position: Sitting, Cuff Size: Adult Regular)  Pulse 72  Temp 98.3  F (36.8  C) (Oral)  Ht 1.753 m (5' 9\")  Wt 87.7 kg (193 lb " 4 oz)  BMI 28.54 kg/m2  Body mass index is 28.54 kg/(m^2).  GENERAL: healthy, alert and no distress  ABDOMEN: soft, nontender, no hepatosplenomegaly, no masses and bowel sounds normal  PSYCH: mentation appears normal, affect normal/bright    Diagnostic Test Results:  No results found for this or any previous visit (from the past 24 hour(s)).    ASSESSMENT/PLAN:     (R10.84) Abdominal pain, generalized  (primary encounter diagnosis)  Comment: Patient complains of lower abdominal pain that comes and goes throughout the day everyday.  I wonder about slow transit/gastroparesis  Plan: I advised that we help him schedule a consult with GI, as I do not believe a third endoscopy will help elucidate the answer to this problem.  Could consider a CT scan - however patient wishes to wait until after he has seen GI. And I agree that that makes sense.    (G47.30) Sleep apnea, unspecified type  Comment: Patient states he is fatigued during the day, and that he feels he doesn't sleep well with his CPAP.   Plan: SLEEP EVALUATION & MANAGEMENT REFERRAL - ADULT        I again suggested a sleep consult, as I have in the past.       Patient Instructions     I will contact the gastroenterologist you saw and see what steps we should next take (or how soon they can get you in for a consultation).    [facility]   Discharged by : Erica KENDRICK MA    If you have any questions regarding your visit please contact your care team:     [facility] Clinic Hours Telephone Number   [address] Dr., Dr.   7am-7pm Monday - Thursday   7am-5pm Fridays  [phone]   (Appointment scheduling available 24/7)   RN Line   [phone] option 2       For a Price Quote for your services, please call our Consumer Price Line at [phone].     What options do I have for visits at the clinic other than the traditional office visit?     To expand how we care for you,  many of our providers are utilizing electronic visits (e-visits) and telephone visits, when medically appropriate, for interactions with their patients rather than a visit in the clinic. We also offer nurse visits for many medical concerns. Just like any other service, we will bill your insurance company for this type of visit based on time spent on the phone with your provider. Not all insurance companies cover these visits. Please check with your medical insurance if this type of visit is covered. You will be responsible for any charges that are not paid by your insurance.   E-visits via PLAYD8hart: generally incur a $35.00 fee.     Telephone visits:   Time spent on the phone: *charged based on time that is spent on the phone in increments of 10 minutes. Estimated cost:   5-10 mins $30.00   11-20 mins. $59.00   21-30 mins. $85.00     Use YoungCurrent (secure email communication and access to your chart) to send your primary care provider a message or make an appointment. Ask someone on your Team how to sign up for YoungCurrent.     As always, Thank you for trusting us with your health care needs!      [facility]:    Scheduling Appointments  [facility]  Call: [phone]    [facility]  Call: [phone]    [facility]  Call: [phone]      WHERE TO GO FOR CARE?    Clinic    Make an appointment if you:       Are sick (cold, cough, flu, sore throat, earache or in pain).       Have a small injury (sprain, small cut, burn or broken bone).       Need a physical exam, Pap smear, vaccine or prescription refill.       Have questions about your health or medicines.    To reach us:      Call [phone] ([phone]). Open 24 hours every day. (For counseling services, call [phone].)    Log into YoungCurrent at [employer] Intel[employer].org. (Visit [email or web address] to create an account.) Hospital emergency room    An emergency is a  serious or life- threatening problem that must be treated right away.    Call 911 or get to the hospital if you have:      Very bad or sudden:            - Chest pain or pressure         - Bleeding         - Head or belly pain         - Dizziness or trouble seeing, walking or                          Speaking      Problems breathing      Blood in your vomit or you are coughing up blood      A major injury (knocked out, loss of a finger or limb, rape, broken bone protruding from skin)    A mental health crisis. (Or call the Mental Health Crisis line at 1-815.470.7552 or Suicide Prevention Hotline at 1-397.775.3114.)    Open 24 hours every day. You don't need an appointment.     Urgent care    Visit urgent care for sickness or small injuries when the clinic is closed. You don't need an appointment. To check hours or find an urgent care near you, visit [email or web address]. Online care    Get online care from [facility] for more than 70 common problems, like colds, allergies and infections. Open 24 hours every day at:   [email or web address]   Need help deciding?    For advice about where to be seen, you may call your clinic and ask to speak with a nurse. We're here for you 24 hours every day.         If you are deaf or hard of hearing, please let us know. We provide many free services including sign language interpreters, oral interpreters, TTYs, telephone amplifiers, note takers and written materials.                     Frannie Talavera MD  [facility]    The information in this document, created by the medical scribe Geraldine Leyva for me, accurately reflects the services I personally performed and the decisions made by me. I have reviewed and approved this document for accuracy prior to leaving the patient care area.

## 2017-08-22 NOTE — NURSING NOTE
"Chief Complaint   Patient presents with     RECHECK     Upper endoscopy f/u       Initial /72 (BP Location: Right arm, Patient Position: Sitting, Cuff Size: Adult Regular)  Pulse 72  Temp 98.3  F (36.8  C) (Oral)  Ht 5' 9\" (1.753 m)  Wt 193 lb 4 oz (87.7 kg)  BMI 28.54 kg/m2 Estimated body mass index is 28.54 kg/(m^2) as calculated from the following:    Height as of this encounter: 5' 9\" (1.753 m).    Weight as of this encounter: 193 lb 4 oz (87.7 kg).  Medication Reconciliation: donna UREÑA, Certified Medical Assistant (AAMA)August 22, 2017 10:18 AM      "

## 2017-08-22 NOTE — MR AVS SNAPSHOT
After Visit Summary   8/22/2017    Garrett Del Rio    MRN: [id number]           Patient Information     Date Of Birth          1945        Visit Information        Provider Department      8/22/2017 10:20 AM Frannie Talavera MD [facility]        Today's Diagnoses     Abdominal pain, generalized    -  1    Sleep apnea, unspecified type          Care Instructions    I will contact the gastroenterologist you saw and see what steps we should next take (or how soon they can get you in for a consultation).    [facility]   Discharged by : Erica KENDRICK MA    If you have any questions regarding your visit please contact your care team:     [facility] Clinic Hours Telephone Number   [address] Dr., Dr., Dr.   7am-7pm Monday - Thursday   7am-5pm Fridays  [phone]   (Appointment scheduling available 24/7)   RN Line   [phone] option 2       For a Price Quote for your services, please call our Consumer Price Line at [phone].     What options do I have for visits at the clinic other than the traditional office visit?     To expand how we care for you, many of our providers are utilizing electronic visits (e-visits) and telephone visits, when medically appropriate, for interactions with their patients rather than a visit in the clinic. We also offer nurse visits for many medical concerns. Just like any other service, we will bill your insurance company for this type of visit based on time spent on the phone with your provider. Not all insurance companies cover these visits. Please check with your medical insurance if this type of visit is covered. You will be responsible for any charges that are not paid by your insurance.   E-visits via healthfinch: generally incur a $35.00 fee.     Telephone visits:   Time spent on the phone: *charged based on time that is spent on the phone in  increments of 10 minutes. Estimated cost:   5-10 mins $30.00   11-20 mins. $59.00   21-30 mins. $85.00     Use Inspire Energy (secure email communication and access to your chart) to send your primary care provider a message or make an appointment. Ask someone on your Team how to sign up for Inspire Energy.     As always, Thank you for trusting us with your health care needs!      [facility]:    Scheduling Appointments  [facility]  Call: [phone]    [facility]  Call: [phone]    [facility]  Call: [phone]      WHERE TO GO FOR CARE?    Clinic    Make an appointment if you:       Are sick (cold, cough, flu, sore throat, earache or in pain).       Have a small injury (sprain, small cut, burn or broken bone).       Need a physical exam, Pap smear, vaccine or prescription refill.       Have questions about your health or medicines.    To reach us:      Call [phone] ([phone]). Open 24 hours every day. (For counseling services, call [phone].)    Log into Inspire Energy at [employer].org. (Visit [email or web address] to create an account.) Hospital emergency room    An emergency is a serious or life- threatening problem that must be treated right away.    Call 829 or get to the hospital if you have:      Very bad or sudden:            - Chest pain or pressure         - Bleeding         - Head or belly pain         - Dizziness or trouble seeing, walking or                          Speaking      Problems breathing      Blood in your vomit or you are coughing up blood      A major injury (knocked out, loss of a finger or limb, rape, broken bone protruding from skin)    A mental health crisis. (Or call the Mental Health Crisis line at 1-341.342.9650 or Suicide Prevention Hotline at 1-549.827.4764.)    Open 24 hours every day. You don't need an appointment.     Urgent care    Visit urgent care for sickness or small  injuries when the clinic is closed. You don't need an appointment. To check hours or find an urgent care near you, visit [email or web address]. Online care    Get online care from [facility] for more than 70 common problems, like colds, allergies and infections. Open 24 hours every day at:   [email or web address]   Need help deciding?    For advice about where to be seen, you may call your clinic and ask to speak with a nurse. We're here for you 24 hours every day.         If you are deaf or hard of hearing, please let us know. We provide many free services including sign language interpreters, oral interpreters, TTYs, telephone amplifiers, note takers and written materials.                         Follow-ups after your visit        Additional Services     SLEEP EVALUATION & MANAGEMENT REFERRAL - ADULT       Please be aware that coverage of these services is subject to the terms and limitations of your health insurance plan.  Call member services at your health plan with any benefit or coverage questions.      Please bring the following to your appointment:    >>   List of current medications   >>   This referral request   >>   Any documents/labs given to you for this referral    [facility]  Ph [phone] (Age 15 and up)                  Future tests that were ordered for you today     Open Future Orders        Priority Expected Expires Ordered    SLEEP EVALUATION & MANAGEMENT REFERRAL - ADULT Routine  8/22/2018 8/22/2017            Who to contact     If you have questions or need follow up information about today's clinic visit or your schedule please contact [facility] directly at [phone].  Normal or non-critical lab and imaging results will be communicated to you by MyChart, letter or phone within 4 business days after the clinic has received the results. If you do not hear from us within 7 days, please contact the clinic through MyChart or phone. If you have a critical or  "abnormal lab result, we will notify you by phone as soon as possible.  Submit refill requests through RoboDynamics or call your pharmacy and they will forward the refill request to us. Please allow 3 business days for your refill to be completed.          Additional Information About Your Visit        MyChart Information     RoboDynamics gives you secure access to your electronic health record. If you see a primary care provider, you can also send messages to your care team and make appointments. If you have questions, please call your primary care clinic.  If you do not have a primary care provider, please call [phone] and they will assist you.        Care EveryWhere ID     This is your Care EveryWhere ID. This could be used by other organizations to access your [place] medical records  [id number]        Your Vitals Were     Pulse Temperature Height BMI (Body Mass Index)          72 98.3  F (36.8  C) (Oral) 5' 9\" (1.753 m) 28.54 kg/m2         Blood Pressure from Last 3 Encounters:   08/22/17 138/72   06/07/17 138/70   03/17/17 136/76    Weight from Last 3 Encounters:   08/22/17 193 lb 4 oz (87.7 kg)   06/07/17 193 lb (87.5 kg)   03/17/17 197 lb (89.4 kg)               Primary Care Provider Office Phone # Fax #    Frannie Maulik Talavera [phone]       [address] [address]        Equal Access to Services     MALLIKA BARKLEY AH: Hadii rody ku stellao [place], waaxda luqadaha, qaybta kaalmada moizyada, veronica nichols. So [facility] [phone].    ATENCIÓN: Si habla español, tiene a del toro disposición servicios gratuitos de asistencia lingüística. Llame al [phone].    We comply with applicable federal civil rights laws and [place] laws. We do not discriminate on the basis of race, color, national origin, age, disability sex, sexual orientation or gender identity.            Thank you!     Thank you for choosing [facility]  for your care. " Our goal is always to provide you with excellent care. Hearing back from our patients is one way we can continue to improve our services. Please take a few minutes to complete the written survey that you may receive in the mail after your visit with us. Thank you!             Your Updated Medication List - Protect others around you: Learn how to safely use, store and throw away your medicines at www.disposemymeds.org.          This list is accurate as of: 8/22/17 11:16 AM.  Always use your most recent med list.                   Brand Name Dispense Instructions for use Diagnosis    * aspirin 81 MG tablet      Take 1 tablet by mouth At Bedtime        * ASPIRIN PO      Take 325 mg by mouth daily 2 tablets daily for arthritis        Calcium-Magnesium-Zinc-D3 Tabs      Take 1 tablet by mouth 2 times daily        EPINEPHrine 0.3 MG/0.3ML injection    EPIPEN 2-NETO    2 each    Inject 0.3 mLs into the muscle once as needed for anaphylaxis for 1 dose.    Exposure       omeprazole 20 MG CR capsule    priLOSEC    180 capsule    Take 1 capsule (20 mg) by mouth 2 times daily    Gastroesophageal reflux disease without esophagitis       RESVERATROL PO      Take by mouth daily        VITAMIN C CR PO      Take  by mouth.        * Notice:  This list has 2 medication(s) that are the same as other medications prescribed for you. Read the directions carefully, and ask your doctor or other care provider to review them with you.

## 2017-08-22 NOTE — PATIENT INSTRUCTIONS
I will contact the gastroenterologist you saw and see what steps we should next take (or how soon they can get you in for a consultation).    [facility]   Discharged by : Erica KENRDICK MA    If you have any questions regarding your visit please contact your care team:     [facility] Clinic Hours Telephone Number   Dr. Cuca Bradford PANIMA Andrade   7am-7pm Monday - Thursday   7am-5pm Fridays  [phone]   (Appointment scheduling available 24/7)   RN Line   [phone] option 2       For a Price Quote for your services, please call our Consumer Price Line at [phone].     What options do I have for visits at the clinic other than the traditional office visit?     To expand how we care for you, many of our providers are utilizing electronic visits (e-visits) and telephone visits, when medically appropriate, for interactions with their patients rather than a visit in the clinic. We also offer nurse visits for many medical concerns. Just like any other service, we will bill your insurance company for this type of visit based on time spent on the phone with your provider. Not all insurance companies cover these visits. Please check with your medical insurance if this type of visit is covered. You will be responsible for any charges that are not paid by your insurance.   E-visits via m-spatialt: generally incur a $35.00 fee.     Telephone visits:   Time spent on the phone: *charged based on time that is spent on the phone in increments of 10 minutes. Estimated cost:   5-10 mins $30.00   11-20 mins. $59.00   21-30 mins. $85.00     Use m-spatialt (secure email communication and access to your chart) to send your primary care provider a message or make an appointment. Ask someone on your Team how to sign up for BitGo.     As always, Thank you for trusting us with your health care needs!      [facility]  Services:    Scheduling Appointments  [facility]  Call: [phone]    [facility]  Call: [phone]    [facility]  Call: [phone]      WHERE TO GO FOR CARE?    Clinic    Make an appointment if you:       Are sick (cold, cough, flu, sore throat, earache or in pain).       Have a small injury (sprain, small cut, burn or broken bone).       Need a physical exam, Pap smear, vaccine or prescription refill.       Have questions about your health or medicines.    To reach us:      Call [phone] ([phone]). Open 24 hours every day. (For counseling services, call [phone].)    Log into [facility] [employer] at V[employer]ix. (Visit Quotefish to create an account.) Hospital emergency room    An emergency is a serious or life- threatening problem that must be treated right away.    Call 827 or get to the hospital if you have:      Very bad or sudden:            - Chest pain or pressure         - Bleeding         - Head or belly pain         - Dizziness or trouble seeing, walking or                          Speaking      Problems breathing      Blood in your vomit or you are coughing up blood      A major injury (knocked out, loss of a finger or limb, rape, broken bone protruding from skin)    A mental health crisis. (Or call the Mental Health Crisis line at 1-248.839.8249 or Suicide Prevention Hotline at 1-330.163.3935.)    Open 24 hours every day. You don't need an appointment.     Urgent care    Visit urgent care for sickness or small injuries when the clinic is closed. You don't need an appointment. To check hours or find an urgent care near you, visit [email or web address]. Online care    Get online care from [facility] for more than 70 common problems, like colds, allergies and infections. Open 24 hours every day at:   [email or web address]   Need help deciding?    For advice about where to be seen, you may call your clinic and ask to speak  with a nurse. We're here for you 24 hours every day.         If you are deaf or hard of hearing, please let us know. We provide many free services including sign language interpreters, oral interpreters, TTYs, telephone amplifiers, note takers and written materials.

## 2017-08-22 NOTE — TELEPHONE ENCOUNTER
Left a VM with Dr. Del Cid's coordinator, Nigel, with the patient's information, complaint & recommendation that he be seen soon. I left our call back number so they can leave us their fax number.  Lindsey Grigsby RN

## 2017-08-24 NOTE — TELEPHONE ENCOUNTER
Routing to PCP.      Dr Del Cid from [facility] is returning phone call to Dr Talavera.  He apologized for taking so long to return the phone call but they had the wrong phone number.    Please call Dr Del Cid back at [phone] he will be available in the morning tomorrow.    Iliana Luong

## 2017-08-25 NOTE — TELEPHONE ENCOUNTER
I attempted to contact this number and the message mentioned a Dr. Schilling, not Dr Del Cid.  Can you please assist Garrett with getting an appointment scheduled for a consult at [facility].  And please let Garrett know we are working on this.

## 2017-08-25 NOTE — TELEPHONE ENCOUNTER
Called patient and he was contacted by [facility] and he has an appointment with them on 9/1/17.    Iliana Luong

## 2017-08-28 ENCOUNTER — TRANSFERRED RECORDS (OUTPATIENT)
Dept: HEALTH INFORMATION MANAGEMENT | Facility: CLINIC | Age: 72
End: 2017-08-28

## 2017-09-01 ENCOUNTER — TRANSFERRED RECORDS (OUTPATIENT)
Dept: HEALTH INFORMATION MANAGEMENT | Facility: CLINIC | Age: 72
End: 2017-09-01

## 2017-10-03 PROBLEM — K57.30 DIVERTICULOSIS OF LARGE INTESTINE: Status: ACTIVE | Noted: 2017-10-03

## 2017-10-04 ENCOUNTER — OFFICE VISIT (OUTPATIENT)
Dept: SLEEP MEDICINE | Facility: CLINIC | Age: 72
End: 2017-10-04
Attending: FAMILY MEDICINE
Payer: COMMERCIAL

## 2017-10-04 VITALS
WEIGHT: 194 LBS | BODY MASS INDEX: 27.77 KG/M2 | DIASTOLIC BLOOD PRESSURE: 83 MMHG | OXYGEN SATURATION: 96 % | HEART RATE: 78 BPM | SYSTOLIC BLOOD PRESSURE: 151 MMHG | HEIGHT: 70 IN

## 2017-10-04 DIAGNOSIS — G47.22 ADVANCED SLEEP PHASE SYNDROME: ICD-10-CM

## 2017-10-04 DIAGNOSIS — F51.04 PSYCHOPHYSIOLOGIC INSOMNIA: ICD-10-CM

## 2017-10-04 DIAGNOSIS — G47.33 OSA (OBSTRUCTIVE SLEEP APNEA): Primary | Chronic | ICD-10-CM

## 2017-10-04 DIAGNOSIS — I10 HYPERTENSION GOAL BP (BLOOD PRESSURE) < 140/90: ICD-10-CM

## 2017-10-04 PROBLEM — K57.30 DIVERTICULOSIS OF LARGE INTESTINE: Chronic | Status: ACTIVE | Noted: 2017-10-03

## 2017-10-04 PROCEDURE — 99205 OFFICE O/P NEW HI 60 MIN: CPT | Performed by: INTERNAL MEDICINE

## 2017-10-04 NOTE — MR AVS SNAPSHOT
After Visit Summary   10/4/2017    Garrett Del Rio    MRN: [id number]           Patient Information     Date Of Birth          1945        Visit Information        Provider Department      10/4/2017 11:00 AM Jered Medina MD [facility]        Today's Diagnoses     ADOLFO (obstructive sleep apnea)    -  1    Hypertension goal BP (blood pressure) < 140/90        Advanced sleep phase syndrome        Psychophysiologic insomnia          Care Instructions    Read the book Say Good Night To Insomnia     Your BMI is Body mass index is 27.84 kg/(m^2).  Weight management is a personal decision.  If you are interested in exploring weight loss strategies, the following discussion covers the approaches that may be successful. Body mass index (BMI) is one way to tell whether you are at a healthy weight, overweight, or obese. It measures your weight in relation to your height.  A BMI of 18.5 to 24.9 is in the healthy range. A person with a BMI of 25 to 29.9 is considered overweight, and someone with a BMI of 30 or greater is considered obese. More than two-thirds of American adults are considered overweight or obese.  Being overweight or obese increases the risk for further weight gain. Excess weight may lead to heart disease and diabetes.  Creating and following plans for healthy eating and physical activity may help you improve your health.  Weight control is part of healthy lifestyle and includes exercise, emotional health, and healthy eating habits. Careful eating habits lifelong are the mainstay of weight control. Though there are significant health benefits from weight loss, long-term weight loss with diet alone may be very difficult to achieve- studies show long-term success with dietary management in less than 10% of people. Attaining a healthy weight may be especially difficult to achieve in those with severe obesity. In some cases, medications, devices and surgical management might be  considered.  What can you do?  If you are overweight or obese and are interested in methods for weight loss, you should discuss this with your provider.     Consider reducing daily calorie intake by 500 calories.     Keep a food journal.     Avoiding skipping meals, consider cutting portions instead.    Diet combined with exercise helps maintain muscle while optimizing fat loss. Strength training is particularly important for building and maintaining muscle mass. Exercise helps reduce stress, increase energy, and improves fitness. Increasing exercise without diet control, however, may not burn enough calories to loose weight.       Start walking three days a week 10-20 minutes at a time    Work towards walking thirty minutes five days a week     Eventually, increase the speed of your walking for 1-2 minutes at time    In addition, we recommend that you review healthy lifestyles and methods for weight loss available through the National Institutes of Health patient information sites:  http://win.niddk.nih.gov/publications/index.htm    And look into health and wellness programs that may be available through your health insurance provider, employer, local community center, or bola club.    Weight management plan: Patient was referred to their PCP to discuss a diet and exercise plan.              Follow-ups after your visit        Follow-up notes from your care team     Return in about 6 months (around 4/4/2018).      Who to contact     If you have questions or need follow up information about today's clinic visit or your schedule please contact [facility] directly at [phone].  Normal or non-critical lab and imaging results will be communicated to you by MyChart, letter or phone within 4 business days after the clinic has received the results. If you do not hear from us within 7 days, please contact the clinic through MyChart or phone. If you have a critical or abnormal lab result, we will notify  "you by phone as soon as possible.  Submit refill requests through [employer] or call your pharmacy and they will forward the refill request to us. Please allow 3 business days for your refill to be completed.          Additional Information About Your Visit        [employer] Information     [employer] gives you secure access to your electronic health record. If you see a primary care provider, you can also send messages to your care team and make appointments. If you have questions, please call your primary care clinic.  If you do not have a primary care provider, please call [phone] and they will assist you.        Care EveryWhere ID     This is your Care EveryWhere ID. This could be used by other organizations to access your [place] medical records  [id number]        Your Vitals Were     Pulse Height Pulse Oximetry BMI (Body Mass Index)          78 1.778 m (5' 10\") 96% 27.84 kg/m2         Blood Pressure from Last 3 Encounters:   10/04/17 151/83   08/22/17 138/72   06/07/17 138/70    Weight from Last 3 Encounters:   10/04/17 88 kg (194 lb)   08/22/17 87.7 kg (193 lb 4 oz)   06/07/17 87.5 kg (193 lb)              We Performed the Following     Comprehensive DME     SLEEP EVALUATION & MANAGEMENT REFERRAL - ADULT          Today's Medication Changes          These changes are accurate as of: 10/4/17 12:14 PM.  If you have any questions, ask your nurse or doctor.               Start taking these medicines.        Dose/Directions    order for DME   Used for:  ADOLFO (obstructive sleep apnea)   Started by:  Jered Medina MD        CPAP 9 cm H20   Quantity:  1 Units   Refills:  0            Where to get your medicines      Information about where to get these medications is not yet available     ! Ask your nurse or doctor about these medications     order for DME                Primary Care Provider Office Phone # Fax #    Frannie Talavera [phone]       [facility] [facility] [address]   "      Equal Access to Services     MALLIKA BARKLEY : Hadii aad ku hadrosioflorence Chelseyemmett, wacalistada lujluiskeilaha, qabrysonta lolaredveronica inman. So [facility] [phone].    ATENCIÓN: Si habla español, tiene a del toro disposición servicios gratuitos de asistencia lingüística. Llame al [phone].    We comply with applicable federal civil rights laws and [place] laws. We do not discriminate on the basis of race, color, national origin, age, disability, sex, sexual orientation, or gender identity.            Thank you!     Thank you for choosing [facility]  for your care. Our goal is always to provide you with excellent care. Hearing back from our patients is one way we can continue to improve our services. Please take a few minutes to complete the written survey that you may receive in the mail after your visit with us. Thank you!             Your Updated Medication List - Protect others around you: Learn how to safely use, store and throw away your medicines at www.disposemymeds.org.          This list is accurate as of: 10/4/17 12:14 PM.  Always use your most recent med list.                   Brand Name Dispense Instructions for use Diagnosis    * aspirin 81 MG tablet      Take 1 tablet by mouth At Bedtime        * ASPIRIN PO      Take 325 mg by mouth daily 2 tablets daily for arthritis        Calcium-Magnesium-Zinc-D3 Tabs      Take 1 tablet by mouth 2 times daily        EPINEPHrine 0.3 MG/0.3ML injection    EPIPEN 2-NETO    2 each    Inject 0.3 mLs into the muscle once as needed for anaphylaxis for 1 dose.    Exposure       omeprazole 20 MG CR capsule    priLOSEC    180 capsule    Take 1 capsule (20 mg) by mouth 2 times daily    Gastroesophageal reflux disease without esophagitis       order for DME     1 Units    CPAP 9 cm H20    ADOLFO (obstructive sleep apnea)       RESVERATROL PO      Take by mouth daily        VITAMIN C CR PO      Take  by mouth.        * Notice:  This list has  2 medication(s) that are the same as other medications prescribed for you. Read the directions carefully, and ask your doctor or other care provider to review them with you.

## 2017-10-04 NOTE — PATIENT INSTRUCTIONS
Read the book Say Good Night To Insomnia     Your BMI is Body mass index is 27.84 kg/(m^2).  Weight management is a personal decision.  If you are interested in exploring weight loss strategies, the following discussion covers the approaches that may be successful. Body mass index (BMI) is one way to tell whether you are at a healthy weight, overweight, or obese. It measures your weight in relation to your height.  A BMI of 18.5 to 24.9 is in the healthy range. A person with a BMI of 25 to 29.9 is considered overweight, and someone with a BMI of 30 or greater is considered obese. More than two-thirds of American adults are considered overweight or obese.  Being overweight or obese increases the risk for further weight gain. Excess weight may lead to heart disease and diabetes.  Creating and following plans for healthy eating and physical activity may help you improve your health.  Weight control is part of healthy lifestyle and includes exercise, emotional health, and healthy eating habits. Careful eating habits lifelong are the mainstay of weight control. Though there are significant health benefits from weight loss, long-term weight loss with diet alone may be very difficult to achieve- studies show long-term success with dietary management in less than 10% of people. Attaining a healthy weight may be especially difficult to achieve in those with severe obesity. In some cases, medications, devices and surgical management might be considered.  What can you do?  If you are overweight or obese and are interested in methods for weight loss, you should discuss this with your provider.     Consider reducing daily calorie intake by 500 calories.     Keep a food journal.     Avoiding skipping meals, consider cutting portions instead.    Diet combined with exercise helps maintain muscle while optimizing fat loss. Strength training is particularly important for building and maintaining muscle mass. Exercise helps reduce  stress, increase energy, and improves fitness. Increasing exercise without diet control, however, may not burn enough calories to loose weight.       Start walking three days a week 10-20 minutes at a time    Work towards walking thirty minutes five days a week     Eventually, increase the speed of your walking for 1-2 minutes at time    In addition, we recommend that you review healthy lifestyles and methods for weight loss available through the National Institutes of Health patient information sites:  http://win.niddk.nih.gov/publications/index.htm    And look into health and wellness programs that may be available through your health insurance provider, employer, local community center, or bola club.    Weight management plan: Patient was referred to their PCP to discuss a diet and exercise plan.

## 2017-10-04 NOTE — NURSING NOTE
"Chief Complaint   Patient presents with     Sleep Problem     Consult - Re-evaluate need of CPAP       Initial /83  Pulse 78  Ht 1.778 m (5' 10\")  Wt 88 kg (194 lb)  SpO2 96%  BMI 27.84 kg/m2 Estimated body mass index is 27.84 kg/(m^2) as calculated from the following:    Height as of this encounter: 1.778 m (5' 10\").    Weight as of this encounter: 88 kg (194 lb).  Medication Reconciliation: complete   Neck circumference: 15.5 inches/39 cm    "

## 2017-10-04 NOTE — PROGRESS NOTES
Sleep Consultation:    Date on this visit: 10/4/2017    Garrett Del Rio is sent by Frannie Talavera for a sleep consultation regarding obstructive sleep apnea .    Primary Physician: Frannie Talavera     Chief Complaint   Patient presents with     Sleep Problem     Consult - Re-evaluate need of CPAP       He hast a commercial driving license.     He initially presented with snoring and witnessed apneas.     He feels he does not get good sleep with CPAP. He gets tangled in the hose.     Overall, the patient rates their experience with PAP as 0 (0 poor, 10 great). The mask is comfortable. The mask is not leaking alot, 7 nights per week. They are not snoring with the mask on. They are not having gasp arousals.  They are not having significant oral/nasal dryness. The pressure settings are comfortable.     Patient uses nasal pillows.    Bedtime is typically 9 pm. Usually it takes about 1 minutes to fall asleep with the mask on. Wake time is typically 2:45 am.  Patient is using PAP therapy 5 hours per night. The patient is usually getting 5 hours of sleep per night.    Patient does not feel rested in the morning.    Total score - [place]: 2 (10/4/2017 10:00 AM)    ResMed   CPAP 9 cmH2O download:  29 total days of use. 1 nonuse days. 96 days with >4 hours use.  Average use 5 hours 35 minutes per day.     Garrett goes to bed at 9:00 AM during the week. He gets up at 3:30 AM before his alarm. He has to check in at 5:45 for work. He falls asleep in <5 minutes.  Garrett denies difficulty falling asleep.  He wakes up 3-4 times a night for 5 minutes before falling back to sleep.  Garrett wakes up to change positions. After 2-3 AM he gets annoyed, agitated and often will get up and go to the bathroom and then goes on the computer. He has trouble falling back to sleep because he is thinking about the time. The brain gets in the way. On weekends, schedule is similar, may get up later, and as late as 7-8 AM. Patient gets an  average of 5 hours of sleep per night.    At cabin he does not use CPAP 1-2 nights and does  Not snore or stop breathing.     Patient does not usually use electronics in bed and watch TV in bed.     Patient does have a regular bed partner. Patient sleeps on his stomach, side. He has no morning headaches, and denies no restless legs.     Garrett has occasional jerks at night and denies any sleep walking and sleep talking.    Garrett denies reflux at night.      Garrett has gained 15-20 pounds since sleep study.  Patient describes themself as a night person, but now is a morning person.  Patient's [place] Sleepiness score 2/24 inconsistent with excessive  daytime sleepiness.  He feels he has sleepiness and has had fatigue. His wife thinks he has both.     Garrett naps daily for 30-60 minutes, he is unsure if he sleeps.  He takes no inadvertant naps.  He denies dozing while driving. Patient was counseled on the importance of driving while alert, to pull over if drowsy, or nap before getting into the vehicle if sleepy.  He uses 8-10 cups/day of coffee. Last caffeine intake is usually before bed.      Allergies:    Allergies   Allergen Reactions     Septra [Sulfamethoxazole W-Trimethoprim]        Medications:    Current Outpatient Prescriptions   Medication Sig Dispense Refill     omeprazole (PRILOSEC) 20 MG CR capsule Take 1 capsule (20 mg) by mouth 2 times daily 180 capsule 3     RESVERATROL PO Take by mouth daily       ASPIRIN PO Take 325 mg by mouth daily 2 tablets daily for arthritis       Multiple Minerals-Vitamins (CALCIUM-MAGNESIUM-ZINC-D3) TABS Take 1 tablet by mouth 2 times daily       Ascorbic Acid (VITAMIN C CR PO) Take  by mouth.       aspirin 81 MG tablet Take 1 tablet by mouth At Bedtime        EPINEPHrine (EPIPEN 2-NETO) 0.3 MG/0.3ML injection Inject 0.3 mLs into the muscle once as needed for anaphylaxis for 1 dose. (Patient not taking: Reported on 10/4/2017) 2 each 3       Problem List:  Patient Active Problem List     Diagnosis Date Noted     ADOLFO (obstructive sleep apnea)- moderate (AHI 21) 11/09/2011     Priority: Medium     Sleep study 03/18/2008 [facility] (177#)  apnea/hypopnea index of 21.6. This was worse in REM where the index increased to 47.3. Overall respiratory disturbance index is 32.8. Oxygen saturations were 91% or greater. There were 10.1 periodic limb movements of sleep with minimal associated arousals. Supine REM was observed at 8 and 9 cm of water pressure. This latter may have conferred a slight advantage, although both pressures markedly improved sleep-disordered breathing. There were no periodic limb movements of sleep noted on nasal CPAP.         Hypertension goal BP (blood pressure) < 140/90 02/09/2011     Priority: Medium     Diverticulosis of large intestine 10/03/2017     Priority: Low     Hiatal hernia 06/12/2013     Priority: Low     Colon polyp 06/12/2013     Priority: Low     Advanced directives, counseling/discussion 08/01/2012     Priority: Low     Discussed advance care planning with patient; information given to patient to review. 8/1/2012          M[place]: positive 02/29/2012     Priority: Low     As child. Chest x-rays negative.        Prostate cancer screening 10/29/2011     Priority: Low     Provided by urologist Dr. Addy [facility]     [phone]       OA (OSTEOARTHRITIS) OF KNEE - left 10/24/2011     Priority: Low     CARDIOVASCULAR SCREENING; LDL GOAL LESS THAN 160 05/09/2010     Priority: Low     GERD (gastroesophageal reflux disease)      Priority: Low     egd 12/90       Esophageal spasms      Priority: Low     has had uses NTG for relief            Past Medical/Surgical History:  Past Medical History:   Diagnosis Date     Kidney stone 11/10/2011    Diagnosed by ultrasound 10/24/11 3-4 mm      MEDIAL MENISCUS TEAR - left 10/24/2011     Past Surgical History:   Procedure Laterality Date     CARDIAC STRESS TST,COMPLETE  2006      KNEE SCOPE,MED/LAT  MENISECTOMY  11/11/11    left, with partial medial menisectomy ONLY       Social History:  Social History     Social History     Marital status:      Spouse name: N/A     Number of children: N/A     Years of education: N/A     Occupational History     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 No     Social History Narrative       Family History:  Family History   Problem Relation Age of Onset     Prostate Cancer Father      C.A.D. Father      DIABETES Paternal Grandfather      Hypertension No family hx of      CANCER No family hx of        Review of Systems:  A complete review of systems reviewed by me is negative with the exeption of what has been mentioned in the history of present illness.  CONSTITUTIONAL: NEGATIVE for weight gain/loss, fever, chills, sweats or night sweats, drug allergies.  EYES: NEGATIVE for changes in vision, blind spots, double vision.  ENT: NEGATIVE for ear pain, sore throat, sinus pain, post-nasal drip, runny nose, bloody nose  CARDIAC: NEGATIVE for fast heartbeats or fluttering in chest, chest pain or pressure, breathlessness when lying flat, swollen legs or swollen feet.  NEUROLOGIC:  POSITIVE for  headaches  DERMATOLOGIC: NEGATIVE for rashes, new moles or change in mole(s)  PULMONARY: NEGATIVE SOB at rest, SOB with activity, dry cough, productive cough, coughing up blood, wheezing or whistling when breathing.    GASTROINTESTINAL:  POSITIVE for  abdominal pain  GENITOURINARY:  "NEGATIVE for pain during urination, blood in urine, urinating more frequently than usual, irregular menstrual periods.  MUSCULOSKELETAL:  POSITIVE for  muscle pain and bone or joint pain  ENDOCRINE: NEGATIVE for increased thirst or urination, diabetes.  LYMPHATIC: NEGATIVE for swollen lymph nodes, lumps or bumps in the breasts or nipple discharge.    Physical Examination:  Vitals: /83  Pulse 78  Ht 1.778 m (5' 10\")  Wt 88 kg (194 lb)  SpO2 96%  BMI 27.84 kg/m2  BMI= Body mass index is 27.84 kg/(m^2).    [place] Total Score 10/4/2017   Total score - [place] 2     GENERAL APPEARANCE: healthy, alert and no distress, occasional psychomotor agitation  EYES: Eyes grossly normal to inspection and conjunctivae and sclerae normal  HENT: ear canals and TM's normal, nose and mouth without ulcers or lesions and oropharynx crowded  NECK: no adenopathy, no asymmetry, masses, or scars and thyroid normal to palpation  RESP: lungs clear to auscultation - no rales, rhonchi or wheezes  CV: regular rates and rhythm, normal S1 S2, no S3 or S4 and no murmur, click or rub  ABDOMEN: soft, nontender, without hepatosplenomegaly or masses  MS: extremities normal- no gross deformities noted  NEURO: Normal strength and tone, mentation intact, speech normal and cranial nerves 2-12 intact  PSYCH: mentation appears normal and affect normal/bright  Mallampati Class: III.  Tonsillar Stage: 1  hidden by pillars.    Impression/Plan:    Moderate obstructive sleep apnea by sleep study 2008, 15# weight gain since then. Good compliance, but symptoms of fatigue/?sleepiness (ESS 2), sleep maintenance difficulties. Minimal data on download. He does not require treatment of obstructive sleep apnea for primary prevention of CV disease. He is motivated to continue because his snoring and apneas bother his wife and he has a commercial driving license. Patient appears to be at low risk for sleep-related driving incidents.  Options discussed.   -Elect " to Continue current treatments. See DME about hose, leak issues. Consider titration polysomnogram and/or change to autopap 9-18 cm     Sleep maintenance difficulties. Suspect primary problem is psychophysiologic insomnia complicated by .sap. Incompletely treated  sleep disordered breathing may also be playing a role. Read the book Say Good Night To Insomnia      Caffeine over-use. Discussed.     Literature provided regarding sleep apnea, sleep hygiene and insomnia.      He will follow up with me in approximately two weeks after his sleep study has been competed to review the results and discuss plan of care.       I spent >60 minutes with patient. More than 1/2 this time spent counselling.     Polysomnography reviewed.  Obstructive sleep apnea reviewed.  Complications of untreated sleep apnea were reviewed.    Jered Medina     CC: Frannie Talavera

## 2017-10-06 ENCOUNTER — DOCUMENTATION ONLY (OUTPATIENT)
Dept: SLEEP MEDICINE | Facility: CLINIC | Age: 72
End: 2017-10-06

## 2017-10-06 DIAGNOSIS — F51.04 PSYCHOPHYSIOLOGIC INSOMNIA: ICD-10-CM

## 2017-10-06 DIAGNOSIS — G47.33 OSA (OBSTRUCTIVE SLEEP APNEA): ICD-10-CM

## 2017-10-06 NOTE — PROGRESS NOTES
Called to let patient know his transfer from [facility] to [facility] has been completed. Patient can now get supplies if needed. Patient stated his supplies he has right now are fine and will contact [facility] when he need supplies.

## 2017-10-10 ENCOUNTER — TELEPHONE (OUTPATIENT)
Dept: SLEEP MEDICINE | Facility: CLINIC | Age: 72
End: 2017-10-10

## 2017-10-10 DIAGNOSIS — G47.33 OSA (OBSTRUCTIVE SLEEP APNEA): Primary | Chronic | ICD-10-CM

## 2017-10-11 NOTE — TELEPHONE ENCOUNTER
Please see my note.  We did discuss options and he decided to continue current treatments.  He had hose issues, not any machine issues that I am aware of  I will put in an order for autocpap 9-18 cm

## 2017-10-11 NOTE — TELEPHONE ENCOUNTER
----- Message from Brit Drake sent at 10/10/2017  9:57 AM CDT -----  Regarding: order for new device?  Hello,    This patient called and is very confused because he said you discussed with him about upgrading his machine and DME coordinators told him there needs to be a an order for it. He doesn't want supplies and said you knew that. I did explain to him that it may not be an issue to get a new order and that you were out of office. He understands you will get back to him at your convenience.    -Brit

## 2017-10-30 ENCOUNTER — TELEPHONE (OUTPATIENT)
Dept: FAMILY MEDICINE | Facility: CLINIC | Age: 72
End: 2017-10-30

## 2017-10-30 NOTE — TELEPHONE ENCOUNTER
Called patient- Garrett rates pain a 4/10 (sometimes it is less, but it hurts more with movement). He denies injury, swelling or bruising.  Denied an appt tomorrow with another provider, scheduled 11/3 with PCP.  Lindsey Grigsby RN

## 2017-10-30 NOTE — TELEPHONE ENCOUNTER
Reason for Call:  Same Day Appointment, Requested Provider:  Cuca Talavera MD    PCP: Frannie Talavera    Reason for visit: right bicep pain    Duration of symptoms: one month    Have you been treated for this in the past? No    Additional comments: would like to see primary before next available (11-)    Can we leave a detailed message on this number? YES    Phone number patient can be reached at:  Garrett Del Rio ([facility]) [phone] (H)         Best Time: anytime    Call taken on 10/30/2017 at 9:35 AM by Sophia Phillips

## 2017-11-03 ENCOUNTER — TELEPHONE (OUTPATIENT)
Dept: FAMILY MEDICINE | Facility: CLINIC | Age: 72
End: 2017-11-03

## 2017-11-03 ENCOUNTER — OFFICE VISIT (OUTPATIENT)
Dept: FAMILY MEDICINE | Facility: CLINIC | Age: 72
End: 2017-11-03
Payer: COMMERCIAL

## 2017-11-03 VITALS
DIASTOLIC BLOOD PRESSURE: 70 MMHG | HEART RATE: 76 BPM | HEIGHT: 70 IN | WEIGHT: 191.8 LBS | SYSTOLIC BLOOD PRESSURE: 148 MMHG | BODY MASS INDEX: 27.46 KG/M2 | TEMPERATURE: 98.8 F

## 2017-11-03 DIAGNOSIS — R74.8 ELEVATED LIVER ENZYMES: ICD-10-CM

## 2017-11-03 DIAGNOSIS — M79.621 PAIN OF RIGHT UPPER ARM: Primary | ICD-10-CM

## 2017-11-03 DIAGNOSIS — Z23 NEED FOR PROPHYLACTIC VACCINATION AND INOCULATION AGAINST INFLUENZA: ICD-10-CM

## 2017-11-03 DIAGNOSIS — G47.33 OSA (OBSTRUCTIVE SLEEP APNEA): Chronic | ICD-10-CM

## 2017-11-03 PROCEDURE — G0008 ADMIN INFLUENZA VIRUS VAC: HCPCS | Performed by: FAMILY MEDICINE

## 2017-11-03 PROCEDURE — 99214 OFFICE O/P EST MOD 30 MIN: CPT | Mod: 25 | Performed by: FAMILY MEDICINE

## 2017-11-03 PROCEDURE — 90662 IIV NO PRSV INCREASED AG IM: CPT | Performed by: FAMILY MEDICINE

## 2017-11-03 NOTE — PROGRESS NOTES
"  SUBJECTIVE:   Garrett Del Rio is a 71 year old male who presents to clinic today for the following health issues:      Joint Pain    Onset: 1 month    Description:   Location:  Right bicep  Character:  \"feels like a muscle strain\"    Intensity: 4/10    Progression of Symptoms: better since making the appt     Accompanying Signs & Symptoms:  Other symptoms: none- but feels like there is a knot there    History:   Previous similar pain: no       Precipitating factors:   Trauma or overuse: no     Alleviating factors:  Improved by: nothing    Therapies Tried and outcome:  None    In the middle of his bicep he states it feels like there is a knot. He says he can feel it with his fingers, but it feels sore in his bicep when he applies pressure. He states that it has gotten a little better since making the appointment, but it's still not gone. He says that when he is just resting, he can feel an ache. Every once in a while he says there is a sudden sharp pain, even without movement. 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 He has not seen a bruise, and hasn't noticed any swelling. He says that the pain is not keeping him from doing anything. He states he has had issues with tendonitis in his right wrist for about 30 years, as well as carpal tunnel.     Abdominal Issues:  He states that his stomach has been better. He states every once in a while he still gets pain in his epigastric region. He states that a week from today, he will have more blood work and an US with his GI. He is going to a different clinic this time, so he is happy about that.     Sleep:  Patient is thinking about looking into different sleep apnea machines as he has not been happy with his. He has had this for about 10 years. He has trouble sleeping with his current machine and mask. He says that this sleep specialist thinks he is having more of a sleep problem than he is having a problem with his machine.           Problem list and histories " reviewed & adjusted, as indicated.  Additional history: as documented    Patient Active Problem List   Diagnosis     GERD (gastroesophageal reflux disease)     Esophageal spasms     CARDIOVASCULAR SCREENING; LDL GOAL LESS THAN 160     Hypertension goal BP (blood pressure) < 140/90     OA (OSTEOARTHRITIS) OF KNEE - left     Prostate cancer screening     ADOLFO (obstructive sleep apnea)- moderate (AHI 21)     Mantoux: positive     Advanced directives, counseling/discussion     Hiatal hernia     Colon polyp     Diverticulosis of large intestine     Psychophysiologic insomnia     Past Surgical History:   Procedure Laterality Date     CARDIAC STRESS TST,COMPLETE  2006     HC KNEE SCOPE,MED/LAT MENISECTOMY  11/11/11    left, with partial medial menisectomy ONLY       Social History   Substance Use Topics     Smoking status: Former Smoker     Packs/day: 1.00     Years: 35.00     Types: Cigarettes, Cigars, Pipe     Start date: 6/6/1963     Quit date: 10/13/1998     Smokeless tobacco: Never Used     Alcohol use Yes      Comment: about a beer a week      Family History   Problem Relation Age of Onset     Prostate Cancer Father      C.A.D. Father      DIABETES Paternal Grandfather      Hypertension No family hx of      CANCER No family hx of          Current Outpatient Prescriptions   Medication Sig Dispense Refill     order for DME CPAP 9 cm H20 1 Units 0     omeprazole (PRILOSEC) 20 MG CR capsule Take 1 capsule (20 mg) by mouth 2 times daily 180 capsule 3     RESVERATROL PO Take by mouth daily       ASPIRIN PO Take 325 mg by mouth daily 2 tablets daily for arthritis       Multiple Minerals-Vitamins (CALCIUM-MAGNESIUM-ZINC-D3) TABS Take 1 tablet by mouth 2 times daily       EPINEPHrine (EPIPEN 2-NETO) 0.3 MG/0.3ML injection Inject 0.3 mLs into the muscle once as needed for anaphylaxis for 1 dose. 2 each 3     Ascorbic Acid (VITAMIN C CR PO) Take  by mouth.       aspirin 81 MG tablet Take 1 tablet by mouth At Bedtime     "    Allergies   Allergen Reactions     Septra [Sulfamethoxazole W-Trimethoprim]          Reviewed and updated as needed this visit by clinical staffTobacco  Allergies  Med Hx  Surg Hx  Fam Hx  Soc Hx      Reviewed and updated as needed this visit by Provider  Allergies  Meds  Problems         ROS:  Constitutional, HEENT, cardiovascular, pulmonary, gi and gu systems are negative, except as otherwise noted.    This document serves as a record of the services and decisions personally performed by AIME BENTLEY. It was created on his/her behalf by Geraldine Leyva, a trained medical scribe. The creation of this document is based on the provider's statements to the medical scribe. Geraldine Leyva, November 3, 2017 10:31 AM    OBJECTIVE:   /70 (BP Location: Right arm, Patient Position: Sitting, Cuff Size: Adult Large)  Pulse 76  Temp 98.8  F (37.1  C) (Oral)  Ht 1.778 m (5' 10\")  Wt 87 kg (191 lb 12.8 oz)  BMI 27.52 kg/m2  Body mass index is 27.52 kg/(m^2).  GENERAL: healthy, alert and no distress  MS: no bony tenderness of right shoulder, no bicep's tendon tenderness. Full range of motion of shoulder and elbow. No bony tenderness of elbow, no brachial radialis tenderness, no swelling or mass. Tenderness of the bicep about 3 inches above elbow.  no gross musculoskeletal defects noted, no edema  NEURO: Normal strength and tone, mentation intact and speech normal  PSYCH: mentation appears normal, affect normal/bright    Diagnostic Test Results:  No results found for this or any previous visit (from the past 24 hour(s)).    ASSESSMENT/PLAN:     1. Pain of right upper arm  Patient states that his pain has improved since he made the appointment. He had full range of motion of both his elbow and his shoulder. There was no obvious tear. I advised him to not over exert his right arm, and to use heat if he has pain. If he is not seeing improvements in 2-4 weeks I advised him to contact me. Reassurance was " provided.    2. Elevated liver enzymes  Continue to monitor.   Is seeing GI next week.    3. ADOLFO (obstructive sleep apnea)- moderate (AHI 21)  Patient has been having trouble with his CPAP and the hose on his mask. It is keeping him awake at night. He has been working with his sleep specialist to determine if there is a better machine for him.     4. Need for prophylactic vaccination and inoculation against influenza  Health maintenance.  - FLU VACCINE, INCREASED ANTIGEN, PRESV FREE, AGE 65+ [55495]  - ADMIN INFLUENZA (For MEDICARE Patients ONLY) []    Patient Instructions     I am glad you are seeing GI again next week.  I will watch for their notes.    I will reach back out to the sleep clinic, so please let me know if you do not hear from them next week.    If your arm is not improving over the next 2-4 weeks please let me know.    Consider wearing a wrist brace on your right hand when you sleep if your carpal tunnel is flaring up.    [facility]   Discharged by : Erica KENDRICK MA    If you have any questions regarding your visit please contact your care team:     [facility] Clinic Hours Telephone Number   Dr. Cuca Barry   7am-7pm Monday - Thursday   7am-5pm Fridays  [phone]   (Appointment scheduling available 24/7)   RN Line   [phone] option 2       For a Price Quote for your services, please call our Consumer Price Line at [phone].     What options do I have for visits at the clinic other than the traditional office visit?     To expand how we care for you, many of our providers are utilizing electronic visits (e-visits) and telephone visits, when medically appropriate, for interactions with their patients rather than a visit in the clinic. We also offer nurse visits for many medical concerns. Just like any other service, we will bill your insurance company for this type of visit based on  time spent on the phone with your provider. Not all insurance companies cover these visits. Please check with your medical insurance if this type of visit is covered. You will be responsible for any charges that are not paid by your insurance.   E-visits via [employer]: generally incur a $35.00 fee.     Telephone visits:   Time spent on the phone: *charged based on time that is spent on the phone in increments of 10 minutes. Estimated cost:   5-10 mins $30.00   11-20 mins. $59.00   21-30 mins. $85.00     Use [employer] (secure email communication and access to your chart) to send your primary care provider a message or make an appointment. Ask someone on your Team how to sign up for [employer].     As always, Thank you for trusting us with your health care needs!      [facility]:    Scheduling Appointments  [place] [place]  Call: [phone]    [facility]  Call: [phone]    [facility]  Call: [phone]    For Gastroenterology referrals   [facility] Gastroenterology   Clinics and Surgery Center, 4th Floor   [address] [place] [id number]   Appointments: [phone]    WHERE TO GO FOR CARE?  Clinic    Make an appointment if you:       Are sick (cold, cough, flu, sore throat, earache or in pain).       Have a small injury (sprain, small cut, burn or broken bone).       Need a physical exam, Pap smear, vaccine or prescription refill.       Have questions about your health or medicines.    To reach us:      Call [phone] ([phone]). Open 24 hours every day. (For counseling services, call [phone].)    Log into [employer] at [email or web address]. (Visit [email or web address] to create an account.) Hospital emergency room    An emergency is a serious or life- threatening problem that must be treated right away.    Call 172 or get to the hospital if you have:      Very bad or sudden:            - Chest pain or  pressure         - Bleeding         - Head or belly pain         - Dizziness or trouble seeing, walking or                          Speaking      Problems breathing      Blood in your vomit or you are coughing up blood      A major injury (knocked out, loss of a finger or limb, rape, broken bone protruding from skin)    A mental health crisis. (Or call the Mental Health Crisis line at 1-615.788.4766 or Suicide Prevention Hotline at 1-641.766.4440.)    Open 24 hours every day. You don't need an appointment.     Urgent care    Visit urgent care for sickness or small injuries when the clinic is closed. You don't need an appointment. To check hours or find an urgent care near you, visit [email or web address]. Online care    Get online care from [facility] for more than 70 common problems, like colds, allergies and infections. Open 24 hours every day at:   [email or web address]   Need help deciding?    For advice about where to be seen, you may call your clinic and ask to speak with a nurse. We're here for you 24 hours every day.         If you are deaf or hard of hearing, please let us know. We provide many free services including sign language interpreters, oral interpreters, TTYs, telephone amplifiers, note takers and written materials.               Frannie Talavera MD  [facility]    The information in this document, created by the medical scribe Geraldine Leyva for me, accurately reflects the services I personally performed and the decisions made by me. I have reviewed and approved this document for accuracy prior to leaving the patient care area.

## 2017-11-03 NOTE — TELEPHONE ENCOUNTER
Patient had a visit at the Sleep clinic with Dr. Medina.  And he was told that he could not look at alternative equipment to his 10 year old CPAP machine until Dr. Medina puts in orders.    Can you please contact the sleep clinic to help facilitate making sure that Garrett can go look at new CPAP equipment?

## 2017-11-03 NOTE — PATIENT INSTRUCTIONS
I am glad you are seeing GI again next week.  I will watch for their notes.    I will reach back out to the sleep clinic, so please let me know if you do not hear from them next week.    If your arm is not improving over the next 2-4 weeks please let me know.    Consider wearing a wrist brace on your right hand when you sleep if your carpal tunnel is flaring up.    [facility]   Discharged by : Erica KENDRICK MA    If you have any questions regarding your visit please contact your care team:     [facility] Clinic Hours Telephone Number   Dr. Cuca Barry   7am-7pm Monday - Thursday   7am-5pm Fridays  [phone]   (Appointment scheduling available 24/7)   RN Line   [phone] option 2       For a Price Quote for your services, please call our [employer] Price Line at [phone].     What options do I have for visits at the clinic other than the traditional office visit?     To expand how we care for you, many of our providers are utilizing electronic visits (e-visits) and telephone visits, when medically appropriate, for interactions with their patients rather than a visit in the clinic. We also offer nurse visits for many medical concerns. Just like any other service, we will bill your insurance company for this type of visit based on time spent on the phone with your provider. Not all insurance companies cover these visits. Please check with your medical insurance if this type of visit is covered. You will be responsible for any charges that are not paid by your insurance.   E-visits via [email or web address]: generally incur a $35.00 fee.     Telephone visits:   Time spent on the phone: *charged based on time that is spent on the phone in increments of 10 minutes. Estimated cost:   5-10 mins $30.00   11-20 mins. $59.00   21-30 mins. $85.00     Use [email or web address] (secure email communication and access to your chart) to send your  primary care provider a message or make an appointment. Ask someone on your Team how to sign up for [employer].     As always, Thank you for trusting us with your health care needs!      [facility]:    Scheduling Appointments  [place] [place]  Call: [phone]    [place] [facility]  Call: [phone]    [facility]  Call: [phone]    For Gastroenterology referrals   [facility] Gastroenterology   Clinics and Surgery [place], 4th Floor   [address] [place] [address]   Appointments: [phone]    WHERE TO GO FOR CARE?  Clinic    Make an appointment if you:       Are sick (cold, cough, flu, sore throat, earache or in pain).       Have a small injury (sprain, small cut, burn or broken bone).       Need a physical exam, Pap smear, vaccine or prescription refill.       Have questions about your health or medicines.    To reach us:      Call [phone] ([phone]). Open 24 hours every day. (For counseling services, call [phone].)    Log into [employer] at [email or web address]. (Visit [email or web address] to create an account.) Hospital emergency room    An emergency is a serious or life- threatening problem that must be treated right away.    Call 228 or get to the hospital if you have:      Very bad or sudden:            - Chest pain or pressure         - Bleeding         - Head or belly pain         - Dizziness or trouble seeing, walking or                          Speaking      Problems breathing      Blood in your vomit or you are coughing up blood      A major injury (knocked out, loss of a finger or limb, rape, broken bone protruding from skin)    A mental health crisis. (Or call the Mental Health Crisis line at 1-545.308.9578 or Suicide Prevention Hotline at 1-518.246.9524.)    Open 24 hours every day. You don't need an appointment.     Urgent care    Visit urgent care for sickness or small injuries when the  clinic is closed. You don't need an appointment. To check hours or find an urgent care near you, visit [email or web address]. Online care    Get online care from [facility] for more than 70 common problems, like colds, allergies and infections. Open 24 hours every day at:   [email or web address]   Need help deciding?    For advice about where to be seen, you may call your clinic and ask to speak with a nurse. We're here for you 24 hours every day.         If you are deaf or hard of hearing, please let us know. We provide many free services including sign language interpreters, oral interpreters, TTYs, telephone amplifiers, note takers and written materials.

## 2017-11-03 NOTE — MR AVS SNAPSHOT
After Visit Summary   11/3/2017    Garrett Del Rio    MRN: [id number]           Patient Information     Date Of Birth          1945        Visit Information        Provider Department      11/3/2017 10:20 AM Frannie Talavera MD [facility]        Today's Diagnoses     Pain of right upper arm    -  1    Elevated liver enzymes        ADOLFO (obstructive sleep apnea)- moderate (AHI 21)        Need for prophylactic vaccination and inoculation against influenza          Care Instructions    I am glad you are seeing GI again next week.  I will watch for their notes.    I will reach back out to the sleep clinic, so please let me know if you do not hear from them next week.    If your arm is not improving over the next 2-4 weeks please let me know.    Consider wearing a wrist brace on your right hand when you sleep if your carpal tunnel is flaring up.    [facility]   Discharged by : Erica KENDRICK MA    If you have any questions regarding your visit please contact your care team:     [facility] Hours Telephone Number   Dr. Cuca Barry   7am-7pm Monday - Thursday   7am-5pm Fridays  [phone]   (Appointment scheduling available 24/7)   RN Line   [phone] option 2       For a Price Quote for your services, please call our Consumer Price Line at [phone].     What options do I have for visits at the clinic other than the traditional office visit?     To expand how we care for you, many of our providers are utilizing electronic visits (e-visits) and telephone visits, when medically appropriate, for interactions with their patients rather than a visit in the clinic. We also offer nurse visits for many medical concerns. Just like any other service, we will bill your insurance company for this type of visit based on time spent on the phone with your provider. Not all  insurance companies cover these visits. Please check with your medical insurance if this type of visit is covered. You will be responsible for any charges that are not paid by your insurance.   E-visits via Open Home Prohart: generally incur a $35.00 fee.     Telephone visits:   Time spent on the phone: *charged based on time that is spent on the phone in increments of 10 minutes. Estimated cost:   5-10 mins $30.00   11-20 mins. $59.00   21-30 mins. $85.00     Use AppJet (secure email communication and access to your chart) to send your primary care provider a message or make an appointment. Ask someone on your Team how to sign up for AppJet.     As always, Thank you for trusting us with your health care needs!      [facility]:    Scheduling Appointments  [facility]  Call: [phone]    Blane Mendenhall [facility]  Call: [phone]    [facility]  Call: [phone]    For Gastroenterology referrals   [facility], 4th Floor   [address] [place] [address]   Appointments: [phone]    WHERE TO GO FOR CARE?  Clinic    Make an appointment if you:       Are sick (cold, cough, flu, sore throat, earache or in pain).       Have a small injury (sprain, small cut, burn or broken bone).       Need a physical exam, Pap smear, vaccine or prescription refill.       Have questions about your health or medicines.    To reach us:      Call [phone] ([phone]). Open 24 hours every day. (For counseling services, call [phone].)    Log into AppJet at [email or web address]. (Visit [email or web address] to create an account.) Hospital emergency room    An emergency is a serious or life- threatening problem that must be treated right away.    Call 527 or get to the hospital if you have:      Very bad or sudden:            - Chest pain or pressure         - Bleeding         - Head or belly  pain         - Dizziness or trouble seeing, walking or                          Speaking      Problems breathing      Blood in your vomit or you are coughing up blood      A major injury (knocked out, loss of a finger or limb, rape, broken bone protruding from skin)    A mental health crisis. (Or call the Mental Health Crisis line at 1-467.835.8252 or Suicide Prevention Hotline at 1-759.891.3482.)    Open 24 hours every day. You don't need an appointment.     Urgent care    Visit urgent care for sickness or small injuries when the clinic is closed. You don't need an appointment. To check hours or find an urgent care near you, visit [email or web address]. Online care    Get online care from [facility] for more than 70 common problems, like colds, allergies and infections. Open 24 hours every day at:   [email or web address]   Need help deciding?    For advice about where to be seen, you may call your clinic and ask to speak with a nurse. We're here for you 24 hours every day.         If you are deaf or hard of hearing, please let us know. We provide many free services including sign language interpreters, oral interpreters, TTYs, telephone amplifiers, note takers and written materials.                   Follow-ups after your visit        Who to contact     If you have questions or need follow up information about today's clinic visit or your schedule please contact [facility] directly at [phone].  Normal or non-critical lab and imaging results will be communicated to you by MyChart, letter or phone within 4 business days after the clinic has received the results. If you do not hear from us within 7 days, please contact the clinic through MyChart or phone. If you have a critical or abnormal lab result, we will notify you by phone as soon as possible.  Submit refill requests through [employer] or call your pharmacy and they will forward the refill request to us. Please allow 3 business days for your refill to be  "completed.          Additional Information About Your Visit        AtheroNovahart Information     [employer] gives you secure access to your electronic health record. If you see a primary care provider, you can also send messages to your care team and make appointments. If you have questions, please call your primary care clinic.  If you do not have a primary care provider, please call [phone] and they will assist you.        Care EveryWhere ID     This is your Care EveryWhere ID. This could be used by other organizations to access your [facility] medical records  [id number]        Your Vitals Were     Pulse Temperature Height BMI (Body Mass Index)          76 98.8  F (37.1  C) (Oral) 5' 10\" (1.778 m) 27.52 kg/m2         Blood Pressure from Last 3 Encounters:   11/03/17 148/70   10/04/17 151/83   08/22/17 138/72    Weight from Last 3 Encounters:   11/03/17 191 lb 12.8 oz (87 kg)   10/04/17 194 lb (88 kg)   08/22/17 193 lb 4 oz (87.7 kg)              We Performed the Following     ADMIN INFLUENZA (For MEDICARE Patients ONLY) []     FLU VACCINE, INCREASED ANTIGEN, PRESV FREE, AGE 65+ [04199]        Primary Care Provider Office Phone # Fax #    Frannie Maulik Talavera [phone]       [facility] [address]        Equal Access to Services     MALLIKA BARKLEY : Hadii rody douglaso Soemmett, waaxda luqadaha, qaybta kaalmada jaydon, veronica granados . So [facility] [phone].    ATENCIÓN: Si habla español, tiene a del toro disposición servicios gratuitos de asistencia lingüística. Llame al [phone].    We comply with applicable federal civil rights laws and [place] laws. We do not discriminate on the basis of race, color, national origin, age, disability, sex, sexual orientation, or gender identity.            Thank you!     Thank you for choosing [facility]  for your care. Our goal is always to provide you with excellent care. Hearing back " from our patients is one way we can continue to improve our services. Please take a few minutes to complete the written survey that you may receive in the mail after your visit with us. Thank you!             Your Updated Medication List - Protect others around you: Learn how to safely use, store and throw away your medicines at www.disposemymeds.org.          This list is accurate as of: 11/3/17 10:57 AM.  Always use your most recent med list.                   Brand Name Dispense Instructions for use Diagnosis    * aspirin 81 MG tablet      Take 1 tablet by mouth At Bedtime        * ASPIRIN PO      Take 325 mg by mouth daily 2 tablets daily for arthritis        Calcium-Magnesium-Zinc-D3 Tabs      Take 1 tablet by mouth 2 times daily        EPINEPHrine 0.3 MG/0.3ML injection    EPIPEN 2-NETO    2 each    Inject 0.3 mLs into the muscle once as needed for anaphylaxis for 1 dose.    Exposure       omeprazole 20 MG CR capsule    priLOSEC    180 capsule    Take 1 capsule (20 mg) by mouth 2 times daily    Gastroesophageal reflux disease without esophagitis       order for DME     1 Units    CPAP 9 cm H20    ADOLFO (obstructive sleep apnea)       RESVERATROL PO      Take by mouth daily        VITAMIN C CR PO      Take  by mouth.        * Notice:  This list has 2 medication(s) that are the same as other medications prescribed for you. Read the directions carefully, and ask your doctor or other care provider to review them with you.

## 2017-11-03 NOTE — NURSING NOTE
"Chief Complaint   Patient presents with     Arm Pain     Right bicep pain     Flu Shot     DONE       Initial /70 (BP Location: Right arm, Patient Position: Sitting, Cuff Size: Adult Large)  Pulse 76  Temp 98.8  F (37.1  C) (Oral)  Ht 5' 10\" (1.778 m)  Wt 191 lb 12.8 oz (87 kg)  BMI 27.52 kg/m2 Estimated body mass index is 27.52 kg/(m^2) as calculated from the following:    Height as of this encounter: 5' 10\" (1.778 m).    Weight as of this encounter: 191 lb 12.8 oz (87 kg).  Medication Reconciliation: complete    "

## 2017-11-07 NOTE — TELEPHONE ENCOUNTER
Left  for return call.  I believe he wants to schedule an appointment for a replacement machine and Bull does have everything she needs to do this.

## 2017-11-10 ENCOUNTER — TRANSFERRED RECORDS (OUTPATIENT)
Dept: HEALTH INFORMATION MANAGEMENT | Facility: CLINIC | Age: 72
End: 2017-11-10

## 2017-11-13 ENCOUNTER — DOCUMENTATION ONLY (OUTPATIENT)
Dept: SLEEP MEDICINE | Facility: CLINIC | Age: 72
End: 2017-11-13

## 2017-11-13 DIAGNOSIS — G47.33 OSA (OBSTRUCTIVE SLEEP APNEA): ICD-10-CM

## 2017-11-13 DIAGNOSIS — F51.04 PSYCHOPHYSIOLOGIC INSOMNIA: ICD-10-CM

## 2017-11-14 PROBLEM — K76.0 NONALCOHOLIC FATTY LIVER DISEASE: Status: ACTIVE | Noted: 2017-11-14

## 2017-11-20 NOTE — PROGRESS NOTES
[facility]. Patient requested to switch masks because he is interested in trying a new style mask. Patient tried on the followings masks: Dreamwear Gel Pillow. Patient selected  Moe [id number], type Dreamwear Gel Pillow mask Large.

## 2017-11-20 NOTE — TELEPHONE ENCOUNTER
Patient was in the clinic on 11/13/17 for a mask fitting and told me he no longer wants a replacement machine. He said his Resmed S8 works just fine and doesn't see why he needs a new machine that has auto capability and a modem. Told patient to let me know if he changes his mind.

## 2018-03-15 ENCOUNTER — TRANSFERRED RECORDS (OUTPATIENT)
Dept: HEALTH INFORMATION MANAGEMENT | Facility: CLINIC | Age: 73
End: 2018-03-15

## 2018-06-14 ENCOUNTER — NURSE TRIAGE (OUTPATIENT)
Dept: NURSING | Facility: CLINIC | Age: 73
End: 2018-06-14

## 2018-06-15 ENCOUNTER — TRANSFERRED RECORDS (OUTPATIENT)
Dept: HEALTH INFORMATION MANAGEMENT | Facility: CLINIC | Age: 73
End: 2018-06-15

## 2018-06-15 NOTE — TELEPHONE ENCOUNTER
"Per Wife, Patient is doing the prep for a colonoscopy scheduled for 6:45 AM tomorrow.  He finished the 64 ounces of Gatorade/powder at 6 PM tonight.  He has had about 4 episodes of vomiting from 6:45 - 7:15 PM tonight.   Denies fever, although, he feels sweaty.  States stool is still dark.  He is having waves of pain below his umbilicus.  Not having this pain now.    Per \"Tips for Colon Cleansing,\" vomiting can occur.  Episodes of vomiting have stopped at this time.    Protocol and care advice reviewed  Caller states understanding of the recommended home care.  Advised wife to call back if he continues to vomit or has a fever.   Advised ok to give clear liquids tonight (water, chicken broth, Sprite)  Advised to call back if further questions or concerns      Additional Information    Negative: Shock suspected (e.g., cold/pale/clammy skin, too weak to stand, low BP, rapid pulse)    Negative: Difficult to awaken or acting confused  (e.g., disoriented, slurred speech)    Negative: Sounds like a life-threatening emergency to the triager    Negative: [1] Vomiting AND [2] contains red blood or black (\"coffee ground\") material  (Exception: few red streaks in vomit that only happened once)    Negative: Severe pain in one eye    Negative: Recent head injury (within last 3 days)    Negative: Recent abdominal injury (within last 3 days)    Negative: [1] Insulin-dependent diabetes (Type I) AND [2] glucose > 400 mg/dl (22 mmol/l)    Negative: [1] SEVERE vomiting (e.g., 6 or more times/day) AND [2] present > 8 hours    Negative: [1] MODERATE vomiting (e.g., 3 - 5 times/day) AND [2] age > 60    Negative: Severe headache (e.g., excruciating) (Exception: similar to previous migraines)    Negative: High-risk adult (e.g., diabetes mellitus, brain tumor, V-P shunt, hernia)    Negative: [1] Drinking very little AND [2] dehydration suspected (e.g., no urine > 12 hours, very dry mouth, very lightheaded)    Negative: Patient sounds very " sick or weak to the triager    Negative: [1] MILD to MODERATE vomiting (e.g., 1-5 times/day) AND [2] abdomen looks much more swollen than usual    Negative: [1] Vomiting AND [2] contains bile (green color)    Negative: [1] Constant abdominal pain AND [2] present > 2 hours    Negative: [1] Fever > 103 F (39.4 C) AND [2] not able to get the fever down using Fever Care Advice    Negative: [1] Fever > 101 F (38.3 C) AND [2] age > 60    Negative: [1] Fever > 101 F (38.3 C) AND [2] bedridden (e.g., nursing home patient, CVA, chronic illness, recovering from surgery)    Negative: [1] Fever > 100.5 F (38.1 C) AND [2] weak immune system (e.g., HIV positive, cancer chemo, splenectomy, organ transplant, chronic steroids)    Negative: Taking any of the following medications: digoxin (Lanoxin), lithium, theophylline, phenytoin (Dilantin)    Negative: [1] MILD or MODERATE vomiting AND [2] present > 48 hours (2 days) (Exception: mild vomiting with associated diarrhea)    Negative: Fever present > 3 days (72 hours)    Negative: [1] MILD vomiting with diarrhea AND [2] present > 5 days    Negative: Alcohol or drug abuse, known or suspected    Negative: Vomiting a prescription medication    [1] SEVERE vomiting (e.g., 6 or more times/day, vomits everything) BUT [2] hydrated (all triage questions negative)    Protocols used: VOMITING-ADULT-

## 2018-06-21 DIAGNOSIS — G47.33 OSA (OBSTRUCTIVE SLEEP APNEA): Primary | Chronic | ICD-10-CM

## 2018-07-12 ENCOUNTER — TRANSFERRED RECORDS (OUTPATIENT)
Dept: HEALTH INFORMATION MANAGEMENT | Facility: CLINIC | Age: 73
End: 2018-07-12

## 2018-07-18 ENCOUNTER — TELEPHONE (OUTPATIENT)
Dept: FAMILY MEDICINE | Facility: CLINIC | Age: 73
End: 2018-07-18

## 2018-07-18 DIAGNOSIS — G47.33 OSA (OBSTRUCTIVE SLEEP APNEA): Primary | Chronic | ICD-10-CM

## 2018-07-18 NOTE — TELEPHONE ENCOUNTER
Sent a message to ENT asking them if they can interrogate patient's CPAP and use this information instead of having to repeat a sleep study. Will await their response.  Joe Singleton RN

## 2018-07-18 NOTE — TELEPHONE ENCOUNTER
Reason for Call:  Other call back    Detailed comments: Patient states that he tried to schedule with Otolaryngology per Dr. Talavera's referral, but they are unable to schedule him since he hasn't had a sleep study in the past 3 years. He would like a call back to discuss if he needs a referral for this sleep study/ if it's appropriate for him to do one.    Phone Number Patient can be reached at: Home number on file [phone] (home)    Best Time: anytime    Can we leave a detailed message on this number? YES    Call taken on 7/18/2018 at 11:48 AM by Akash Villatoro

## 2018-07-18 NOTE — TELEPHONE ENCOUNTER
He last saw Dr. Medina in 10/17.  He has a CPAP machine that can be interogated.  So perhaps ENT can review his chart and review his sleep center notes and CPAP interrogation?

## 2018-07-19 ENCOUNTER — TRANSFERRED RECORDS (OUTPATIENT)
Dept: HEALTH INFORMATION MANAGEMENT | Facility: CLINIC | Age: 73
End: 2018-07-19

## 2018-07-19 NOTE — TELEPHONE ENCOUNTER
Spoke with Garrett who was told by ENT he needs referral for sleep study. 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 Will route to Pcp and lino up referral.    Karma Rdz,Clinic Rn  [place]

## 2018-07-19 NOTE — TELEPHONE ENCOUNTER
Reason for Call:  Other call back    Detailed comments: Patient calling to check status of request, please advise.     Phone Number Patient can be reached at: Home number on file [phone] (home)    Best Time: Anytime    Can we leave a detailed message on this number? YES    Call taken on 7/19/2018 at 9:12 AM by Beryl Olmedo

## 2018-07-19 NOTE — TELEPHONE ENCOUNTER
Called and left message for Garrett to call back and leave location then finish referral and sign.  Karma Rdz,Clinic Rn  [place]

## 2018-07-20 NOTE — TELEPHONE ENCOUNTER
"Patient calls clinic again, requesting to speak with RN. He is frustrated, states he does not feel another sleep study would provide valid information.  He states he is going to perform completely different than at home, states he wears the CPAP nightly except for when he goes out of town.  He states he has no symptoms unless he stops wearing it for several days.  He staets it is uncomfortable and only works when he is on his back, and prevents him from getting a good night sleep. He seeks to get rid of the CPAP and just wants to speak with ENT regarding other options.  He states he does not want to go back to Dr. Medina because \"all he did was recommend a book and wasn't helpful at all.\"  Chart reviewed, and it appears we had sent a note to ENT to see if they could review information from last visit with Dr. Medina in October and avoid another sleep study.  No response noted, so RN will make a follow-up call to check status, as patient is very frustrated and would like to resolve this and move forward.      Called [facility] and spoke with RN there, who states per Dr. Diamond's protocol, patient needs to have a sleep study done within the past 3 years.      This was relayed to patient, who states he doesn't think it will help, is a waste of time and reiterates above, that it would be invalid because he likely won't even have any symptoms.  RN suggested he could give the sleep center a call to see what their recommendation would be in regards to preparing for the test, may have him sleep without the CPAP for a few nights prior to reproduce symptoms for test?  Also that he could use a different provider than previously if he wishes.  He is in agreement to giving them a call to see and will go from there.  Referral made for [facility] location per patient preference, and he was provided with contact information.    Lizz Hill RN      "

## 2018-09-05 ENCOUNTER — OFFICE VISIT (OUTPATIENT)
Dept: FAMILY MEDICINE | Facility: CLINIC | Age: 73
End: 2018-09-05
Payer: COMMERCIAL

## 2018-09-05 VITALS
SYSTOLIC BLOOD PRESSURE: 140 MMHG | WEIGHT: 189.4 LBS | DIASTOLIC BLOOD PRESSURE: 84 MMHG | OXYGEN SATURATION: 98 % | BODY MASS INDEX: 27.11 KG/M2 | HEART RATE: 76 BPM | HEIGHT: 70 IN | TEMPERATURE: 97.8 F

## 2018-09-05 DIAGNOSIS — K44.9 HIATAL HERNIA: ICD-10-CM

## 2018-09-05 DIAGNOSIS — K21.9 GASTROESOPHAGEAL REFLUX DISEASE WITHOUT ESOPHAGITIS: Chronic | ICD-10-CM

## 2018-09-05 DIAGNOSIS — G47.33 OSA (OBSTRUCTIVE SLEEP APNEA): Chronic | ICD-10-CM

## 2018-09-05 DIAGNOSIS — R10.84 ABDOMINAL PAIN, GENERALIZED: Primary | ICD-10-CM

## 2018-09-05 PROCEDURE — 99214 OFFICE O/P EST MOD 30 MIN: CPT | Performed by: FAMILY MEDICINE

## 2018-09-05 ASSESSMENT — PAIN SCALES - GENERAL: PAINLEVEL: SEVERE PAIN (6)

## 2018-09-05 NOTE — MR AVS SNAPSHOT
After Visit Summary   9/5/2018    Garrett Del Rio    MRN: [id number]           Patient Information     Date Of Birth          1945        Visit Information        Provider Department      9/5/2018 1:40 PM Frannie Talavera MD [facility]        Today's Diagnoses     Abdominal pain, generalized    -  1    ADOLFO (obstructive sleep apnea)- moderate (AHI 21)        Hiatal hernia        Gastroesophageal reflux disease without esophagitis          Care Instructions    I recommend you do another sleep study if you would like to sleep without your CPAP. Send me a message when you are ready to schedule this.     Please schedule a surgery consultation.      [facility]   Discharged by : Erica KENDRICK MA    If you have any questions regarding your visit please contact your care team:     Team Gold                Clinic Hours Telephone Number     Dr. Cuca Bonilla, CNP  Janneth Seymour, CNP 7am-7pm  Monday - Thursday   7am-5pm  Fridays  [phone]   (Appointment scheduling available 24/7)     RN Line  [phone] option 2     Urgent Care - [place] - 11am-9pm Monday-Friday Saturday-Sunday- 9am-5pm     [place] -   5pm-9pm Monday-Friday Saturday-Sunday- 9am-5pm    [phone] - [place]    [phone] - [place]       For a Price Quote for your services, please call our Consumer Price Line at [phone].     What options do I have for visits at the clinic other than the traditional office visit?     To expand how we care for you, many of our providers are utilizing electronic visits (e-visits) and telephone visits, when medically appropriate, for interactions with their patients rather than a visit in the clinic. We also offer nurse visits for many medical concerns. Just like any other service, we will bill your insurance company for this type of visit based on time  spent on the phone with your provider. Not all insurance companies cover these visits. Please check with your medical insurance if this type of visit is covered. You will be responsible for any charges that are not paid by your insurance.   E-visits via ModtiharConfer: generally incur a $35.00 fee.     Telephone visits:  Time spent on the phone: *charged based on time that is spent on the phone in increments of 10 minutes. Estimated cost:   5-10 mins $30.00   11-20 mins. $59.00   21-30 mins. $85.00       Use Club Scene Network (secure email communication and access to your chart) to send your primary care provider a message or make an appointment. Ask someone on your Team how to sign up for Club Scene Network.     As always, Thank you for trusting us with your health care needs!      [facility]:    Scheduling Appointments  [facility] [place]  Call: [phone]    [facility]  Call: [phone]    [facility]  Call: [phone]    For Gastroenterology referrals   [facility] Gastroenterology   Clinics and Surgery Center, 4th Floor   [address] [place] [address]   Appointments: [phone]    WHERE TO GO FOR CARE?  Clinic    Make an appointment if you:       Are sick (cold, cough, flu, sore throat, earache or in pain).       Have a small injury (sprain, small cut, burn or broken bone).       Need a physical exam, Pap smear, vaccine or prescription refill.       Have questions about your health or medicines.    To reach us:      Call [phone] ([phone]). Open 24 hours every day. (For counseling services, call [phone].)    Log into Club Scene Network at Useful at [email or web address]. (Visit [email or web address] to create an account.) Hospital emergency room    An emergency is a serious or life- threatening problem that must be treated right away.    Call 296 or get to the hospital if you have:      Very bad or sudden:            - Chest pain or  pressure         - Bleeding         - Head or belly pain         - Dizziness or trouble seeing, walking or                          Speaking      Problems breathing      Blood in your vomit or you are coughing up blood      A major injury (knocked out, loss of a finger or limb, rape, broken bone protruding from skin)    A mental health crisis. (Or call the Mental Health Crisis line at 1-600.953.7754 or Suicide Prevention Hotline at 1-506.241.3678.)    Open 24 hours every day. You don't need an appointment.     Urgent care    Visit urgent care for sickness or small injuries when the clinic is closed. You don't need an appointment. To check hours or find an urgent care near you, visit [email or web address]. Online care    Get online care from [facility] for more than 70 common problems, like colds, allergies and infections. Open 24 hours every day at:   [email or web address]   Need help deciding?    For advice about where to be seen, you may call your clinic and ask to speak with a nurse. We're here for you 24 hours every day.         If you are deaf or hard of hearing, please let us know. We provide many free services including sign language interpreters, oral interpreters, TTYs, telephone amplifiers, note takers and written materials.                   Follow-ups after your visit        Additional Services     GENERAL SURG ADULT REFERRAL       Your provider has referred you to: [place]: [place] [facility] [place] [phone]   [email or web address] [place] [email or web address]    Please be aware that coverage of these services is subject to the terms and limitations of your health insurance plan.  Call member services at your health plan with any benefit or coverage questions.      Please bring the following with you to your appointment:    (1) Any X-Rays, CTs or MRIs which have been performed.  Contact the facility where they were done to arrange for  prior to your scheduled appointment.   (2) List of current medications   (3)  "This referral request   (4) Any documents/labs given to you for this referral                  Who to contact     If you have questions or need follow up information about today's clinic visit or your schedule please contact [facility] directly at [phone].  Normal or non-critical lab and imaging results will be communicated to you by MyChart, letter or phone within 4 business days after the clinic has received the results. If you do not hear from us within 7 days, please contact the clinic through IKANO Communicationshart or phone. If you have a critical or abnormal lab result, we will notify you by phone as soon as possible.  Submit refill requests through Access Mobile or call your pharmacy and they will forward the refill request to us. Please allow 3 business days for your refill to be completed.          Additional Information About Your Visit        IKANO CommunicationsharActualSun Information     Access Mobile gives you secure access to your electronic health record. If you see a primary care provider, you can also send messages to your care team and make appointments. If you have questions, please call your primary care clinic.  If you do not have a primary care provider, please call [phone] and they will assist you.        Care EveryWhere ID     This is your Care EveryWhere ID. This could be used by other organizations to access your [facility] medical records  [id number]        Your Vitals Were     Pulse Temperature Height Pulse Oximetry BMI (Body Mass Index)       76 97.8  F (36.6  C) (Oral) 5' 10\" (1.778 m) 98% 27.18 kg/m2        Blood Pressure from Last 3 Encounters:   09/05/18 140/84   11/03/17 148/70   10/04/17 151/83    Weight from Last 3 Encounters:   09/05/18 189 lb 6.4 oz (85.9 kg)   11/03/17 191 lb 12.8 oz (87 kg)   10/04/17 194 lb (88 kg)              We Performed the Following     GENERAL SURG ADULT REFERRAL          Today's Medication Changes          These changes are accurate as of 9/5/18  2:16 PM.  If you have any " questions, ask your nurse or doctor.               These medicines have changed or have updated prescriptions.        Dose/Directions    omeprazole 20 MG CR capsule   Commonly known as:  priLOSEC   This may have changed:  when to take this   Used for:  Gastroesophageal reflux disease without esophagitis        Dose:  20 mg   Take 1 capsule (20 mg) by mouth 2 times daily   Quantity:  180 capsule   Refills:  3                Primary Care Provider Office Phone # Fax #    Frannie Maulik Talavera [phone]       [facility] [address]        Equal Access to Services     PRADEEP [facility] : Hadii rody palacios hadasho Soemmett, waaxda luqadaha, qaybta kaalmada adeegyadanielle, veronica granados . So [facility] [phone].    ATENCIÓN: Si habla español, tiene a del toro disposición servicios gratuitos de asistencia lingüística. Llame al [phone].    We comply with applicable federal civil rights laws and [place] laws. We do not discriminate on the basis of race, color, national origin, age, disability, sex, sexual orientation, or gender identity.            Thank you!     Thank you for choosing [facility]  for your care. Our goal is always to provide you with excellent care. Hearing back from our patients is one way we can continue to improve our services. Please take a few minutes to complete the written survey that you may receive in the mail after your visit with us. Thank you!             Your Updated Medication List - Protect others around you: Learn how to safely use, store and throw away your medicines at www.disposemymeds.org.          This list is accurate as of 9/5/18  2:16 PM.  Always use your most recent med list.                   Brand Name Dispense Instructions for use Diagnosis    * aspirin 81 MG tablet      Take 1 tablet by mouth At Bedtime        * ASPIRIN PO      Take 325 mg by mouth daily 2 tablets daily for arthritis         Calcium-Magnesium-Zinc-D3 Tabs      Take 1 tablet by mouth 2 times daily        EPINEPHrine 0.3 MG/0.3ML injection    EPIPEN 2-NETO    2 each    Inject 0.3 mLs into the muscle once as needed for anaphylaxis for 1 dose.    Exposure       omeprazole 20 MG CR capsule    priLOSEC    180 capsule    Take 1 capsule (20 mg) by mouth 2 times daily    Gastroesophageal reflux disease without esophagitis       order for DME     1 Units    CPAP 9 cm H20    ADOLFO (obstructive sleep apnea)       RESVERATROL PO      Take by mouth daily        VITAMIN C CR PO      Take  by mouth.        * Notice:  This list has 2 medication(s) that are the same as other medications prescribed for you. Read the directions carefully, and ask your doctor or other care provider to review them with you.

## 2018-09-05 NOTE — PROGRESS NOTES
"Colonoscopy - with small polyps      SUBJECTIVE:   Garrett Del Rio is a 72 year old male who presents to clinic today for the following health issues:      Follow up not seen by ENT.      Had GI ultrasound which showed fatty liver.  GI believes that this is cause of elevated liver enzymes  Saw GI regarding pain in 3/18  Advised it is likely due to diverticulosis.  Advised to use fiber and have colonoscopy.  Had colonoscopy 6/18 - which showed diverticulosis       Abdominal/Flank Pain  Duration of complaint: ab. Pain    Feels more when walking around, can feel like \"razor blades\"    Patient reports he feels the pain mostly when he gets up and moves around, worst when he goes from sitting to standing. He reports that the pain doesn't always seem to be in the same place, sometimes lower, sometimes higher, but usually in the middle of his abdomen. The pain last for a few seconds, and when he gets moving it resolves. His wife reports that this has been happening about twice weekly for a while, but he reports that this has been about 2 days monthly. The last time he had the pain, it seemed to affect his appetite which is unusual. He reports that this doesn't seem to be constipation related. Patient has a regular, large and hard BM. He reports that yesterday he had multiple BM's, the first one was hard, the second was hard at first, then softer, and the last BM was loose. When he has a BM, he feels okay after like he cleared it all. Pain does not increase after BM. Denies dysuria or urinary retention. He reports that usually his urine is darker and more fragrant than it should be. His urologist has told him he needs to drink more fluids.    Pain resolves after changing positions, fairly quickly.     Sleep:  Patient does not feel that he needs to do sleep study. He reports that his body has adjusted to sleeping with a CPAP, so even when he does not wear his CPAP he does not snore or quit breathing. ENT will not see him " "without a sleep study, and he does not want to get a sleep study. He is frustrated by this as he feels that he does not have time to deal with this, and he says \"I don't care\". He notes that he is uncomfortable with the hose. His wife encourages him to continue using his CPAP at night. His wife reports that he seems to have daily fatigue.       Problem list and histories reviewed & adjusted, as indicated.  Additional history: as documented    Patient Active Problem List   Diagnosis     GERD (gastroesophageal reflux disease)     Esophageal spasms     CARDIOVASCULAR SCREENING; LDL GOAL LESS THAN 160     Hypertension goal BP (blood pressure) < 140/90     OA (OSTEOARTHRITIS) OF KNEE - left     Prostate cancer screening     ADOLFO (obstructive sleep apnea)- moderate (AHI 21)     Mantoux: positive     Advanced directives, counseling/discussion     Hiatal hernia     Colon polyp     Diverticulosis of large intestine     Psychophysiologic insomnia     Nonalcoholic fatty liver disease     Past Surgical History:   Procedure Laterality Date     CARDIAC STRESS TST,COMPLETE  2006     HC KNEE SCOPE,MED/LAT MENISECTOMY  11/11/11    left, with partial medial menisectomy ONLY       Social History   Substance Use Topics     Smoking status: Former Smoker     Packs/day: 1.00     Years: 35.00     Types: Cigarettes, Cigars, Pipe     Start date: 6/6/1963     Quit date: 10/13/1998     Smokeless tobacco: Never Used     Alcohol use Yes      Comment: about a beer a week      Family History   Problem Relation Age of Onset     Prostate Cancer Father      C.A.D. Father      Diabetes Paternal Grandfather      Hypertension No family hx of      Cancer No family hx of          Current Outpatient Prescriptions   Medication Sig Dispense Refill     Ascorbic Acid (VITAMIN C CR PO) Take  by mouth.       aspirin 81 MG tablet Take 1 tablet by mouth At Bedtime        ASPIRIN PO Take 325 mg by mouth daily 2 tablets daily for arthritis       EPINEPHrine (EPIPEN " "2-NETO) 0.3 MG/0.3ML injection Inject 0.3 mLs into the muscle once as needed for anaphylaxis for 1 dose. 2 each 3     Multiple Minerals-Vitamins (CALCIUM-MAGNESIUM-ZINC-D3) TABS Take 1 tablet by mouth 2 times daily       omeprazole (PRILOSEC) 20 MG CR capsule Take 1 capsule (20 mg) by mouth 2 times daily (Patient taking differently: Take 20 mg by mouth daily ) 180 capsule 3     order for DME CPAP 9 cm H20 1 Units 0     RESVERATROL PO Take by mouth daily       Allergies   Allergen Reactions     Septra [Sulfamethoxazole W-Trimethoprim]        Reviewed and updated as needed this visit by clinical staff  Tobacco  Allergies  Meds  Problems  Soc Hx      Reviewed and updated as needed this visit by Provider  Allergies  Meds  Problems         ROS:  Constitutional, HEENT, cardiovascular, pulmonary, gi and gu systems are negative, except as otherwise noted.    The information in this document, created by the medical scribe Geraldine Leyva for me, accurately reflects the services I personally performed and the decisions made by me. I have reviewed and approved this document for accuracy prior to leaving the patient care area.    OBJECTIVE:     /84 (BP Location: Right arm, Patient Position: Chair, Cuff Size: Adult Large)  Pulse 76  Temp 97.8  F (36.6  C) (Oral)  Ht 1.778 m (5' 10\")  Wt 85.9 kg (189 lb 6.4 oz)  SpO2 98%  BMI 27.18 kg/m2  Body mass index is 27.18 kg/(m^2).  GENERAL: healthy, alert and no distress  ABDOMEN:  disathesis recti vs possible ventral hernia.  Pain to palpation over both lower quadrants, worse with abdominal contraction suggestive of muscular pain. No inguinal bulge or pain. No masses noted   PSYCH: mentation appears normal, affect normal/bright    Diagnostic Test Results:  No results found for this or any previous visit (from the past 24 hour(s)).    ASSESSMENT/PLAN:     (R10.84) Abdominal pain, generalized  (primary encounter diagnosis)  Comment: Patient continues to have intermittent " abdominal pain  Plan: GENERAL SURG ADULT REFERRAL        Given the nature of the pain, I believe it is muscular vs hernia.  Discussed options of CT of abdomen vs evaluation by surgeon and patient has elected to see surgeon for hernia eval first which I agree with.    (G47.33) ADOLFO (obstructive sleep apnea)- moderate (AHI 21)  Comment: Patient complains of trouble sleeping with CPAP and feels that he does not have apnea without it.  Plan: Patient declines repeat sleep study at this time. He will discontinue CPAP use, and let me know when he would like to schedule sleep study. I discussed the possibility of home sleep study with the patient.      (K44.9) Hiatal hernia  Comment: Patient has a history of hiatal hernia.  Plan: Stable. Continue to monitor.     (K21.9) Gastroesophageal reflux disease without esophagitis  Current symptoms do not seem consistent with reflux.    Ongoing fatigue  Likely multifactorial.  Lab evaluation has been normal.  Has been working with sleep clinic, but still continues to be bothered by CPAP machine.  Also dispute between himself and his wife about how much resting/sleeping he should do during the day.  May need to consider mood.    Patient Instructions     I recommend you do another sleep study if you would like to sleep without your CPAP. Send me a message when you are ready to schedule this.     Please schedule a surgery consultation.      [facility]   Discharged by : Erica KENDRICK MA    If you have any questions regarding your visit please contact your care team:     Team Gold                Clinic Hours Telephone Number     Dr. Cuca Bonilla, ALEC Seymour, CNP 7am-7pm  Monday - Thursday   7am-5pm  Fridays  [phone]   (Appointment scheduling available 24/7)     RN Line  [phone] option 2     [place] [place] [facility] - 11am-9pm Monday-Friday Saturday-Sunday- 9am-5pm      [place] -   5pm-9pm Monday-Friday Saturday-Sunday- 9am-5pm    [phone] - [place]    [phone] - [place]       For a Price Quote for your services, please call our Consumer Price Line at [phone].     What options do I have for visits at the clinic other than the traditional office visit?     To expand how we care for you, many of our providers are utilizing electronic visits (e-visits) and telephone visits, when medically appropriate, for interactions with their patients rather than a visit in the clinic. We also offer nurse visits for many medical concerns. Just like any other service, we will bill your insurance company for this type of visit based on time spent on the phone with your provider. Not all insurance companies cover these visits. Please check with your medical insurance if this type of visit is covered. You will be responsible for any charges that are not paid by your insurance.   E-visits via LittleLives: generally incur a $35.00 fee.     Telephone visits:  Time spent on the phone: *charged based on time that is spent on the phone in increments of 10 minutes. Estimated cost:   5-10 mins $30.00   11-20 mins. $59.00   21-30 mins. $85.00       Use To8tot (secure email communication and access to your chart) to send your primary care provider a message or make an appointment. Ask someone on your Team how to sign up for To8tot.     As always, Thank you for trusting us with your health care needs!      [facility]:    Scheduling Appointments  [facility]  Call: [phone]    [facility]  Call: [phone]    [facility]  Call: [phone]    For Gastroenterology referrals   [facility] Gastroenterology   Clinics and Surgery Center, 4th Floor   [address] [place] [address]   Appointments: [phone]    WHERE TO GO FOR CARE?  Clinic    Make an appointment if you:       Are sick  (cold, cough, flu, sore throat, earache or in pain).       Have a small injury (sprain, small cut, burn or broken bone).       Need a physical exam, Pap smear, vaccine or prescription refill.       Have questions about your health or medicines.    To reach us:      Call [phone] ([phone]). Open 24 hours every day. (For counseling services, call [phone].)    Log into [employer] at fanatix. (Visit Cyanogen to create an account.) Hospital emergency room    An emergency is a serious or life- threatening problem that must be treated right away.    Call 911 or get to the hospital if you have:      Very bad or sudden:            - Chest pain or pressure         - Bleeding         - Head or belly pain         - Dizziness or trouble seeing, walking or                          Speaking      Problems breathing      Blood in your vomit or you are coughing up blood      A major injury (knocked out, loss of a finger or limb, rape, broken bone protruding from skin)    A mental health crisis. (Or call the Mental Health Crisis line at 1-827.125.4272 or Suicide Prevention Hotline at 1-656.608.5131.)    Open 24 hours every day. You don't need an appointment.     Urgent care    Visit urgent care for sickness or small injuries when the clinic is closed. You don't need an appointment. To check hours or find an urgent care near you, visit [email or web address]. Online care    Get online care from [facility] for more than 70 common problems, like colds, allergies and infections. Open 24 hours every day at:   [email or web address]   Need help deciding?    For advice about where to be seen, you may call your clinic and ask to speak with a nurse. We're here for you 24 hours every day.         If you are deaf or hard of hearing, please let us know. We provide many free services including sign language interpreters, oral interpreters, TTYs, telephone amplifiers, note takers and written materials.               Frannie  Maulik Talavera MD  [facility]    The information in this document, created by the medical scribe Geraldine Leyva for me, accurately reflects the services I personally performed and the decisions made by me. I have reviewed and approved this document for accuracy prior to leaving the patient care area.

## 2018-09-05 NOTE — PATIENT INSTRUCTIONS
I recommend you do another sleep study if you would like to sleep without your CPAP. Send me a message when you are ready to schedule this.     Please schedule a surgery consultation.      [facility]   Discharged by : Erica KENDRICK MA    If you have any questions regarding your visit please contact your care team:     Team Gold                Clinic Hours Telephone Number     Dr. Cuca Bonilla, CNP  Janneth Seymour, CNP 7am-7pm  Monday - Thursday   7am-5pm  Fridays  [phone]   (Appointment scheduling available 24/7)     RN Line  [phone] option 2     Urgent Care - [place] - 11am-9pm Monday-Friday Saturday-Sunday- 9am-5pm     [place] -   5pm-9pm Monday-Friday Saturday-Sunday- 9am-5pm    [phone] - [place]    [phone] - [place]       For a Price Quote for your services, please call our Consumer Price Line at [phone].     What options do I have for visits at the clinic other than the traditional office visit?     To expand how we care for you, many of our providers are utilizing electronic visits (e-visits) and telephone visits, when medically appropriate, for interactions with their patients rather than a visit in the clinic. We also offer nurse visits for many medical concerns. Just like any other service, we will bill your insurance company for this type of visit based on time spent on the phone with your provider. Not all insurance companies cover these visits. Please check with your medical insurance if this type of visit is covered. You will be responsible for any charges that are not paid by your insurance.   E-visits via Pat[employer]pin: generally incur a $35.00 fee.     Telephone visits:  Time spent on the phone: *charged based on time that is spent on the phone in increments of 10 minutes. Estimated cost:   5-10 mins $30.00   11-20 mins. $59.00   21-30 mins. $85.00       Use  To8tohart (secure email communication and access to your chart) to send your primary care provider a message or make an appointment. Ask someone on your Team how to sign up for Liztic.     As always, Thank you for trusting us with your health care needs!      [facility]:    Scheduling Appointments  [facility]  Call: [phone]    [facility]  Call: [phone]    [facility]  Call: [phone]    For Gastroenterology referrals   [facility] [place] [facility]   Clinics and Surgery Center, 4th Floor   [address] [place] [address]   Appointments: [phone]    WHERE TO GO FOR CARE?  Clinic    Make an appointment if you:       Are sick (cold, cough, flu, sore throat, earache or in pain).       Have a small injury (sprain, small cut, burn or broken bone).       Need a physical exam, Pap smear, vaccine or prescription refill.       Have questions about your health or medicines.    To reach us:      Call [phone] ([phone]). Open 24 hours every day. (For counseling services, call [phone].)    Log into Liztic at [email or web address]. (Visit [email or web address] to create an account.) Hospital emergency room    An emergency is a serious or life- threatening problem that must be treated right away.    Call 926 or get to the hospital if you have:      Very bad or sudden:            - Chest pain or pressure         - Bleeding         - Head or belly pain         - Dizziness or trouble seeing, walking or                          Speaking      Problems breathing      Blood in your vomit or you are coughing up blood      A major injury (knocked out, loss of a finger or limb, rape, broken bone protruding from skin)    A mental health crisis. (Or call the Mental Health Crisis line at 1-726.281.2014 or Suicide Prevention Hotline at 1-398.909.8132.)    Open 24 hours every day. You don't need an appointment.      Urgent care    Visit urgent care for sickness or small injuries when the clinic is closed. You don't need an appointment. To check hours or find an urgent care near you, visit [email or web address]. Online care    Get online care from [facility] for more than 70 common problems, like colds, allergies and infections. Open 24 hours every day at:   [email or web address]   Need help deciding?    For advice about where to be seen, you may call your clinic and ask to speak with a nurse. We're here for you 24 hours every day.         If you are deaf or hard of hearing, please let us know. We provide many free services including sign language interpreters, oral interpreters, TTYs, telephone amplifiers, note takers and written materials.

## 2018-09-10 ENCOUNTER — OFFICE VISIT (OUTPATIENT)
Dept: SURGERY | Facility: CLINIC | Age: 73
End: 2018-09-10
Payer: COMMERCIAL

## 2018-09-10 VITALS
DIASTOLIC BLOOD PRESSURE: 76 MMHG | WEIGHT: 189 LBS | SYSTOLIC BLOOD PRESSURE: 130 MMHG | TEMPERATURE: 98.2 F | BODY MASS INDEX: 27.12 KG/M2 | HEART RATE: 82 BPM | OXYGEN SATURATION: 98 %

## 2018-09-10 DIAGNOSIS — R10.30 LOWER ABDOMINAL PAIN: ICD-10-CM

## 2018-09-10 PROCEDURE — 99204 OFFICE O/P NEW MOD 45 MIN: CPT | Performed by: SURGERY

## 2018-09-10 ASSESSMENT — PAIN SCALES - GENERAL: PAINLEVEL: NO PAIN (0)

## 2018-09-10 NOTE — ASSESSMENT & PLAN NOTE
Non specific lower abdominal pain with radiation to the umbilicus.    Right inguinal hernia appreciated on physical exam  Diastasis recti noted on physical exam.  Unable to reproduce pain during exam.    Plan for CT A/P.      Source of discomfort is likely from a sliding inguinal hernia.  Discussed possible options of repair at today's appointment.  Will await results of CT A/P and discuss options after.    All questions were asked.

## 2018-09-10 NOTE — LETTER
9/10/2018         RE: Amilcar Del Rio  [address]  [place] [facility] [address]        Dear Colleague,    Thank you for referring your patient, Amilcar Del Rio, to the [facility]. Please see a copy of my visit note below.    Frannie Talavera  [address] [facility] [address]    AMILCAR DEL RIO    Patient seen in consultation for abdominal pain by Frannie Talavera      I had the pleasure of seeing Amilcar Del Rio in my office on 9/10/2018 for evaluation.    CC:   Chief Complaint   Patient presents with     Abdominal Pain     Recurring abdominal pain.  Frquency of pain has increased in the 2 months     Today diagnosis (R10.30) Lower abdominal pain  Comment:  Plan: CT Abdomen Pelvis w Contrast            HPI: 72-year-old  male is referred to clinic for generalized abdominal pain.  Patient states that the pain has been going on for several months but has been increasing in frequency over the last 2.  Patient describes the pain as sharp 6-8 out of 10, intermittent abdominal pain that is primarily in bilateral lower quadrants with radiation up to the umbilicus.  Patient denies any pain supraumbilically or epigastrically.  Nothing seems to make it better.  Most commonly noted when going from sitting to standing position.  Patient describes a sensation of razor blades in both groins.  Denies any diarrhea constipation or blood in stool.  Patient has been seen and evaluated by GI doctor for the symptoms EGD and colonoscopy were performed which showed no obvious source of note he does have diverticulosis long-standing history of reflux and hiatal hernia.    PAST MEDICAL HISTORY:  Past Medical History:   Diagnosis Date     Kidney stone 11/10/2011    Diagnosed by ultrasound 10/24/11 3-4 mm      MEDIAL MENISCUS TEAR - left 10/24/2011       PAST SURGICAL HISTORY:  Past Surgical History:   Procedure Laterality Date     CARDIAC STRESS TST,COMPLETE  2006      KNEE SCOPE,MED/LAT  MENISECTOMY  11/11/11    left, with partial medial menisectomy ONLY       MEDICATIONS:    Current Outpatient Prescriptions:      Ascorbic Acid (VITAMIN C CR PO), Take  by mouth., Disp: , Rfl:      aspirin 81 MG tablet, Take 1 tablet by mouth At Bedtime , Disp: , Rfl:      EPINEPHrine (EPIPEN 2-NETO) 0.3 MG/0.3ML injection, Inject 0.3 mLs into the muscle once as needed for anaphylaxis for 1 dose., Disp: 2 each, Rfl: 3     Multiple Minerals-Vitamins (CALCIUM-MAGNESIUM-ZINC-D3) TABS, Take 1 tablet by mouth 2 times daily, Disp: , Rfl:      omeprazole (PRILOSEC) 20 MG CR capsule, Take 1 capsule (20 mg) by mouth 2 times daily (Patient taking differently: Take 20 mg by mouth daily ), Disp: 180 capsule, Rfl: 3     order for DME, CPAP 9 cm H20, Disp: 1 Units, Rfl: 0     RESVERATROL PO, Take by mouth daily, Disp: , Rfl:      ASPIRIN PO, Take 325 mg by mouth daily 2 tablets daily for arthritis, Disp: , Rfl:     ALLERGIES:  Allergies   Allergen Reactions     Septra [Sulfamethoxazole W-Trimethoprim]        SOCIAL HISTORY:  Social History     Social History     Marital status:      Spouse name: N/A     Number of children: N/A     Years of education: N/A     Occupational History     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 No     Social History Narrative       FAMILY HISTORY:   Family History   Problem Relation Age of Onset     Prostate Cancer Father      C.A.D. Father      Diabetes Paternal Grandfather      Hypertension No  family hx of      Cancer No family hx of      No significant family history of cardiovascular disease otherwise.    REVIEW OF SYSTEMS:  CONSTITUTIONAL:NEGATIVE for fever, chills, change in weight  INTEGUMENTARY/SKIN: NEGATIVE for worrisome rashes, moles or lesions  EYES: NEGATIVE for vision changes or irritation  ENT/MOUTH: NEGATIVE for ear, mouth and throat problems  RESP:NEGATIVE for significant cough or SOB  BREAST: NEGATIVE for masses, tenderness or discharge  CV: NEGATIVE for chest pain, palpitations or peripheral edema  GI: POSITIVE for abdominal pain RLQ, LLQ and periumbilical and NEGATIVE for constipation, diarrhea, dyspepsia, dysphagia, gas or bloating, heartburn or reflux, hematemesis, hematochezia and hemorrhoids  negative, dysuria and hematuria  MUSCULOSKELETAL: NEGATIVE for significant arthralgias or myalgia  NEURO: NEGATIVE for weakness, dizziness or paresthesias  ENDOCRINE: NEGATIVE for temperature intolerance, skin/hair changes  HEME/ALLERGY/IMMUNE: NEGATIVE for bleeding problems  PSYCHIATRIC: NEGATIVE for changes in mood or affect        PHYSICAL EXAMINATION:  Vitals: /76  Pulse 82  Temp 98.2  F (36.8  C) (Oral)  Wt 85.7 kg (189 lb)  SpO2 98%  BMI 27.12 kg/m2  BMI= Body mass index is 27.12 kg/(m^2).  GENERAL/PSYCH: Patient is awake, A&Ox3, NAD, stable mood, good judgement and insight.  HEAD: Atraumatic, Normocephalic  EYES: Anicteric. Pupils equal and reactive  NECK: No masses/LNs. Trachea midline.  CHEST: Symmetrical, Respiratory effort WNL, no stridor.  HEART: Regular Rate and Rhythm.   ABDOMEN: Soft, non-tender, non-distended.  Diastasis recti noted.  Palpable, reducible right inguinal hernia noted w/out evidence of strangulation.  No R/R/G appreciated.  No palpable abdominal masses.   LOWER EXTREMITIES: No gross deformity. Pulses palpable and equal bilaterally.  SKIN: No visible generalized rash.      LABS:    No visits with results within 3 Month(s) from this visit.  Latest known  visit with results is:    Orders Only on 06/09/2017   Component Date Value     Specimen Description 06/09/2017 Feces      H Pylori Antigen 06/09/2017                      Value:Negative for Helicobacter pylori antigen by enzyme immunoassay. A negative   result indicates the absence of H. pylori antigen or that the level of antigen   is below the level of detection.       Micro Report Status 06/09/2017 FINAL 06/12/2017        STUDIES: None    IMPRESSION and PLAN:  Lower abdominal pain  Non specific lower abdominal pain with radiation to the umbilicus.    Right inguinal hernia appreciated on physical exam  Diastasis recti noted on physical exam.  Unable to reproduce pain during exam.    Plan for CT A/P.      Source of discomfort is likely from a sliding inguinal hernia.  Discussed possible options of repair at today's appointment.  Will await results of CT A/P and discuss options after.    All questions were asked.        All questions were answered.      Again, thank you for allowing me to participate in the care of your patient.        Sincerely,        Nigel Poon,

## 2018-09-10 NOTE — PROGRESS NOTES
Frannie Talavera  [address] [facility] [address]    AMILCAR DEL RIO    Patient seen in consultation for abdominal pain by Frannie Talavera      I had the pleasure of seeing Amilcar Del Rio in my office on 9/10/2018 for evaluation.    CC:   Chief Complaint   Patient presents with     Abdominal Pain     Recurring abdominal pain.  Frquency of pain has increased in the 2 months     Today diagnosis (R10.30) Lower abdominal pain  Comment:  Plan: CT Abdomen Pelvis w Contrast            HPI: 72-year-old  male is referred to clinic for generalized abdominal pain.  Patient states that the pain has been going on for several months but has been increasing in frequency over the last 2.  Patient describes the pain as sharp 6-8 out of 10, intermittent abdominal pain that is primarily in bilateral lower quadrants with radiation up to the umbilicus.  Patient denies any pain supraumbilically or epigastrically.  Nothing seems to make it better.  Most commonly noted when going from sitting to standing position.  Patient describes a sensation of razor blades in both groins.  Denies any diarrhea constipation or blood in stool.  Patient has been seen and evaluated by GI doctor for the symptoms EGD and colonoscopy were performed which showed no obvious source of note he does have diverticulosis long-standing history of reflux and hiatal hernia.    PAST MEDICAL HISTORY:  Past Medical History:   Diagnosis Date     Kidney stone 11/10/2011    Diagnosed by ultrasound 10/24/11 3-4 mm      MEDIAL MENISCUS TEAR - left 10/24/2011       PAST SURGICAL HISTORY:  Past Surgical History:   Procedure Laterality Date     CARDIAC STRESS TST,COMPLETE  2006      KNEE SCOPE,MED/LAT MENISECTOMY  11/11/11    left, with partial medial menisectomy ONLY       MEDICATIONS:    Current Outpatient Prescriptions:      Ascorbic Acid (VITAMIN C CR PO), Take  by mouth., Disp: , Rfl:      aspirin 81 MG tablet, Take 1 tablet by mouth At  Bedtime , Disp: , Rfl:      EPINEPHrine (EPIPEN 2-NETO) 0.3 MG/0.3ML injection, Inject 0.3 mLs into the muscle once as needed for anaphylaxis for 1 dose., Disp: 2 each, Rfl: 3     Multiple Minerals-Vitamins (CALCIUM-MAGNESIUM-ZINC-D3) TABS, Take 1 tablet by mouth 2 times daily, Disp: , Rfl:      omeprazole (PRILOSEC) 20 MG CR capsule, Take 1 capsule (20 mg) by mouth 2 times daily (Patient taking differently: Take 20 mg by mouth daily ), Disp: 180 capsule, Rfl: 3     order for DME, CPAP 9 cm H20, Disp: 1 Units, Rfl: 0     RESVERATROL PO, Take by mouth daily, Disp: , Rfl:      ASPIRIN PO, Take 325 mg by mouth daily 2 tablets daily for arthritis, Disp: , Rfl:     ALLERGIES:  Allergies   Allergen Reactions     Septra [Sulfamethoxazole W-Trimethoprim]        SOCIAL HISTORY:  Social History     Social History     Marital status:      Spouse name: N/A     Number of children: N/A     Years of education: N/A     Occupational History     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 No     Social History Narrative       FAMILY HISTORY:   Family History   Problem Relation Age of Onset     Prostate Cancer Father      C.A.D. Father      Diabetes Paternal Grandfather      Hypertension No family hx of      Cancer No family hx of      No significant family history of cardiovascular disease otherwise.    REVIEW OF SYSTEMS:  CONSTITUTIONAL:NEGATIVE for fever, chills, change in weight  INTEGUMENTARY/SKIN: NEGATIVE for worrisome  rashes, moles or lesions  EYES: NEGATIVE for vision changes or irritation  ENT/MOUTH: NEGATIVE for ear, mouth and throat problems  RESP:NEGATIVE for significant cough or SOB  BREAST: NEGATIVE for masses, tenderness or discharge  CV: NEGATIVE for chest pain, palpitations or peripheral edema  GI: POSITIVE for abdominal pain RLQ, LLQ and periumbilical and NEGATIVE for constipation, diarrhea, dyspepsia, dysphagia, gas or bloating, heartburn or reflux, hematemesis, hematochezia and hemorrhoids  negative, dysuria and hematuria  MUSCULOSKELETAL: NEGATIVE for significant arthralgias or myalgia  NEURO: NEGATIVE for weakness, dizziness or paresthesias  ENDOCRINE: NEGATIVE for temperature intolerance, skin/hair changes  HEME/ALLERGY/IMMUNE: NEGATIVE for bleeding problems  PSYCHIATRIC: NEGATIVE for changes in mood or affect        PHYSICAL EXAMINATION:  Vitals: /76  Pulse 82  Temp 98.2  F (36.8  C) (Oral)  Wt 85.7 kg (189 lb)  SpO2 98%  BMI 27.12 kg/m2  BMI= Body mass index is 27.12 kg/(m^2).  GENERAL/PSYCH: Patient is awake, A&Ox3, NAD, stable mood, good judgement and insight.  HEAD: Atraumatic, Normocephalic  EYES: Anicteric. Pupils equal and reactive  NECK: No masses/LNs. Trachea midline.  CHEST: Symmetrical, Respiratory effort WNL, no stridor.  HEART: Regular Rate and Rhythm.   ABDOMEN: Soft, non-tender, non-distended.  Diastasis recti noted.  Palpable, reducible right inguinal hernia noted w/out evidence of strangulation.  No R/R/G appreciated.  No palpable abdominal masses.   LOWER EXTREMITIES: No gross deformity. Pulses palpable and equal bilaterally.  SKIN: No visible generalized rash.      LABS:    No visits with results within 3 Month(s) from this visit.  Latest known visit with results is:    Orders Only on 06/09/2017   Component Date Value     Specimen Description 06/09/2017 Feces      H Pylori Antigen 06/09/2017                      Value:Negative for Helicobacter pylori antigen by enzyme immunoassay. A  negative   result indicates the absence of H. pylori antigen or that the level of antigen   is below the level of detection.       Micro Report Status 06/09/2017 FINAL 06/12/2017        STUDIES: None    IMPRESSION and PLAN:  Lower abdominal pain  Non specific lower abdominal pain with radiation to the umbilicus.    Right inguinal hernia appreciated on physical exam  Diastasis recti noted on physical exam.  Unable to reproduce pain during exam.    Plan for CT A/P.      Source of discomfort is likely from a sliding inguinal hernia.  Discussed possible options of repair at today's appointment.  Will await results of CT A/P and discuss options after.    All questions were asked.        All questions were answered.

## 2018-09-10 NOTE — MR AVS SNAPSHOT
After Visit Summary   9/10/2018    Garrett Del Rio    MRN: [id number]           Patient Information     Date Of Birth          1945        Visit Information        Provider Department      9/10/2018 9:15 AM Nigel Poon,  [facility]        Today's Diagnoses     Lower abdominal pain           Follow-ups after your visit        Your next 10 appointments already scheduled     Sep 12, 2018 11:00 AM CDT   CT ABDOMEN PELVIS W CONTRAST with BECT1   [facility] [place] ([facility])    [id number] [facility]  [place] [id number] [phone]           How do I prepare for my exam? (Food and drink instructions) To prepare: Do not eat or drink for 2 hours before your exam. If you need to take medicine, you may take it with small sips of water. (We may ask you to take liquid medicine as well.)  How do I prepare for my exam? (Other instructions) Please arrive 30 minutes early for your CT.  Once in the department you might be asked to drink water 15-20 minutes prior to your exam.  If indicated you may be asked to drink an oral contrast in advance of your CT.  If this is the case, the imaging team will let you know or be in contact with you prior to your appointment  Patients over 70 or patients with diabetes or kidney problems: If you haven t had a blood test (creatinine test) within the last 30 days, the Cardiologist/Radiologist may require you to get this test prior to your exam.  If you have diabetes:  Continue to take your metformin medication on the day of your exam  What should I wear: Please wear loose clothing, such as a sweat suit or jogging clothes. Avoid snaps, zippers and other metal. We may ask you to undress and put on a hospital gown.  How long does the exam take: Most scans take less than 20 minutes.  What should I bring: Please bring any scans or X-rays taken at other hospitals, if similar tests were done. Also bring a list of your  medicines, including vitamins, minerals and over-the-counter drugs. It is safest to leave personal items at home.  Do I need a : No  is needed.  What do I need to tell my doctor? Be sure to tell your doctor: * If you have any allergies. * If there s any chance you are pregnant. * If you are breastfeeding.  What should I do after the exam: No restrictions, You may resume normal activities.  What is this test: A CT (computed tomography) scan is a series of pictures that allows us to look inside your body. The scanner creates images of the body in cross sections, much like slices of bread. This helps us see any problems more clearly. You may receive contrast (X-ray dye) before or during your scan. You will be asked to drink the contrast.  Who should I call with questions: If you have any questions, please call the Imaging Department where you will have your exam. Directions, parking instructions, and other information is available on our website, [email or web address].              Future tests that were ordered for you today     Open Future Orders        Priority Expected Expires Ordered    CT Abdomen Pelvis w Contrast Routine  9/10/2019 9/10/2018            Who to contact     If you have questions or need follow up information about today's clinic visit or your schedule please contact [facility] directly at [phone].  Normal or non-critical lab and imaging results will be communicated to you by MyChart, letter or phone within 4 business days after the clinic has received the results. If you do not hear from us within 7 days, please contact the clinic through MyChart or phone. If you have a critical or abnormal lab result, we will notify you by phone as soon as possible.  Submit refill requests through [employer] or call your pharmacy and they will forward the refill request to us. Please allow 3 business days for your refill to be completed.          Additional Information About Your Visit         [employer] Information     [employer] gives you secure access to your electronic health record. If you see a primary care provider, you can also send messages to your care team and make appointments. If you have questions, please call your primary care clinic.  If you do not have a primary care provider, please call [phone] and they will assist you.        Care EveryWhere ID     This is your Care EveryWhere ID. This could be used by other organizations to access your [place] medical records  [id number]        Your Vitals Were     Pulse Temperature Pulse Oximetry BMI (Body Mass Index)          82 98.2  F (36.8  C) (Oral) 98% 27.12 kg/m2         Blood Pressure from Last 3 Encounters:   09/10/18 130/76   09/05/18 140/84   11/03/17 148/70    Weight from Last 3 Encounters:   09/10/18 85.7 kg (189 lb)   09/05/18 85.9 kg (189 lb 6.4 oz)   11/03/17 87 kg (191 lb 12.8 oz)                 Today's Medication Changes          These changes are accurate as of 9/10/18 11:28 AM.  If you have any questions, ask your nurse or doctor.               These medicines have changed or have updated prescriptions.        Dose/Directions    omeprazole 20 MG CR capsule   Commonly known as:  priLOSEC   This may have changed:  when to take this   Used for:  Gastroesophageal reflux disease without esophagitis        Dose:  20 mg   Take 1 capsule (20 mg) by mouth 2 times daily   Quantity:  180 capsule   Refills:  3                Primary Care Provider Office Phone # Fax #    Frannie Maulik Talavera [phone]       [facility] [facility] [address]        Equal Access to Services     [facility] Hospital[facility] AH: Hadii rody patterson [place], waaxda luqadaha, qaybta kaalmada veronica interiano. So [facility] [phone].    ATENCIÓN: Si habla español, tiene a del toro disposición servicios gratuitos de asistencia lingüística. Llame al [phone].    We comply with applicable federal civil rights laws  and [place] laws. We do not discriminate on the basis of race, color, national origin, age, disability, sex, sexual orientation, or gender identity.            Thank you!     Thank you for choosing [facility]  for your care. Our goal is always to provide you with excellent care. Hearing back from our patients is one way we can continue to improve our services. Please take a few minutes to complete the written survey that you may receive in the mail after your visit with us. Thank you!             Your Updated Medication List - Protect others around you: Learn how to safely use, store and throw away your medicines at www.disposemymeds.org.          This list is accurate as of 9/10/18 11:28 AM.  Always use your most recent med list.                   Brand Name Dispense Instructions for use Diagnosis    * aspirin 81 MG tablet      Take 1 tablet by mouth At Bedtime        * ASPIRIN PO      Take 325 mg by mouth daily 2 tablets daily for arthritis        Calcium-Magnesium-Zinc-D3 Tabs      Take 1 tablet by mouth 2 times daily        EPINEPHrine 0.3 MG/0.3ML injection    EPIPEN 2-NETO    2 each    Inject 0.3 mLs into the muscle once as needed for anaphylaxis for 1 dose.    Exposure       omeprazole 20 MG CR capsule    priLOSEC    180 capsule    Take 1 capsule (20 mg) by mouth 2 times daily    Gastroesophageal reflux disease without esophagitis       order for DME     1 Units    CPAP 9 cm H20    ADOLFO (obstructive sleep apnea)       RESVERATROL PO      Take by mouth daily        VITAMIN C CR PO      Take  by mouth.        * Notice:  This list has 2 medication(s) that are the same as other medications prescribed for you. Read the directions carefully, and ask your doctor or other care provider to review them with you.

## 2018-09-12 ENCOUNTER — RADIANT APPOINTMENT (OUTPATIENT)
Dept: CT IMAGING | Facility: CLINIC | Age: 73
End: 2018-09-12
Attending: SURGERY
Payer: COMMERCIAL

## 2018-09-12 DIAGNOSIS — R10.30 LOWER ABDOMINAL PAIN: ICD-10-CM

## 2018-09-12 LAB
CREAT BLD-MCNC: 1 MG/DL (ref 0.5–1.2)
GFR SERPL CREATININE-BSD FRML MDRD: 73 ML/MIN/{1.73_M2}
GFRB: 75
RADIOLOGIST FLAGS: ABNORMAL

## 2018-09-12 PROCEDURE — 74177 CT ABD & PELVIS W/CONTRAST: CPT | Mod: TC

## 2018-09-12 PROCEDURE — 82565 ASSAY OF CREATININE: CPT

## 2018-09-12 RX ORDER — IOPAMIDOL 755 MG/ML
93 INJECTION, SOLUTION INTRAVASCULAR ONCE
Status: COMPLETED | OUTPATIENT
Start: 2018-09-12 | End: 2018-09-12

## 2018-09-12 RX ADMIN — IOPAMIDOL 93 ML: 755 INJECTION, SOLUTION INTRAVASCULAR at 11:26

## 2018-09-14 ENCOUNTER — TELEPHONE (OUTPATIENT)
Dept: SURGERY | Facility: CLINIC | Age: 73
End: 2018-09-14

## 2018-09-14 ENCOUNTER — TELEPHONE (OUTPATIENT)
Dept: FAMILY MEDICINE | Facility: CLINIC | Age: 73
End: 2018-09-14

## 2018-09-14 DIAGNOSIS — R91.8 PULMONARY NODULES: Primary | ICD-10-CM

## 2018-09-14 DIAGNOSIS — K40.90 INGUINAL HERNIA OF RIGHT SIDE WITHOUT OBSTRUCTION OR GANGRENE: Primary | ICD-10-CM

## 2018-09-14 NOTE — TELEPHONE ENCOUNTER
Reason for Call:  Other orders    Detailed comments: Patient was seen 9-10 and had a CT scan 9-12. Patient calling to schedule surgery. Need orders.    Phone Number Patient can be reached at: Home number on file [phone] (home)    Best Time: any    Can we leave a detailed message on this number? YES    Call taken on 9/14/2018 at 10:56 AM by Kellie Chang

## 2018-09-14 NOTE — TELEPHONE ENCOUNTER
Reason for Call: Request for an order or referral:    Order or referral being requested: CT of Lungs    Date needed: as soon as possible    Has the patient been seen by the PCP for this problem? NO    Additional comments: Patient states that he received the results of his last CT, and they had caught a bit of his lungs and noticed Nodules. The provider suggested that he have a second CT scan with the intent of looking at the nodules in his lungs. Please call to discuss    Phone number Patient can be reached at:  Home number on file [phone] (home)    Best Time:  anytime    Can we leave a detailed message on this number?  YES    Call taken on 9/14/2018 at 10:59 AM by Akash Villatoro

## 2018-09-14 NOTE — TELEPHONE ENCOUNTER
Reason for Call:  Other call back    Detailed comments: Patient has additional questions regarding surgery: recovery time, etc. Please call patient [phone] or [phone] between 9:00am & 2:00pm    Phone Number Patient can be reached at: Home number on file [phone] (home)    Best Time: 9:00am-2:00pm    Can we leave a detailed message on this number? YES    Call taken on 9/14/2018 at 2:41 PM by Kellie Chang

## 2018-09-14 NOTE — TELEPHONE ENCOUNTER
Patient has additional questions regarding surgery. Sent message to Dr Toney. Patient will call back to schedule after talks with Dr Toney.

## 2018-09-14 NOTE — TELEPHONE ENCOUNTER
Pended chest CT since nodules were found (unsure if this is the correct CT.) Seen 9/5.  Lindsey Grigsby RN

## 2018-09-18 ENCOUNTER — RADIANT APPOINTMENT (OUTPATIENT)
Dept: CT IMAGING | Facility: CLINIC | Age: 73
End: 2018-09-18
Attending: FAMILY MEDICINE
Payer: COMMERCIAL

## 2018-09-18 DIAGNOSIS — R91.8 PULMONARY NODULES: ICD-10-CM

## 2018-09-18 PROCEDURE — 71250 CT THORAX DX C-: CPT | Mod: TC

## 2018-09-19 ENCOUNTER — MYC MEDICAL ADVICE (OUTPATIENT)
Dept: FAMILY MEDICINE | Facility: CLINIC | Age: 73
End: 2018-09-19

## 2018-09-19 NOTE — TELEPHONE ENCOUNTER
Please note:  I spoke to wife this afternoon (we have a consent to communicate document on file) and provided her with results and a plan.    I have messaged Barbra Grigsby (clinic nurse specialist) in the pulmonary clinic - who typically will return my message with a plan, and enact the plan.    So please watch the chart to make sure something is scheduled for Garrett by Friday or if not, please message me.

## 2018-09-19 NOTE — TELEPHONE ENCOUNTER
Patient wants 10-17 for surgery. Patient will be seeing Dr Garcia 9-25-18 for possible surgery orders from him for 9-17-18 surgery.

## 2018-09-20 NOTE — TELEPHONE ENCOUNTER
Can you please call Garrett and review my message with him?  I am concerned about his response.  And... I did talk with his wife yesterday. So I wonder if he has had further conversation with her.    And, certainly, this information may be frightening to him, and perhaps this is his response to that fright or concern.  So I want to be sympathetic to his concern and make sure we are supporting him.    We perhaps should schedule follow up with me 3-4 weeks out just to touch base.

## 2018-09-20 NOTE — TELEPHONE ENCOUNTER
Also - I did just hear back from the lung clinic - and they are reviewing his CT with their physicians in their morning rounds tomorrow - and should be reaching out to him tomorrow for scheduling.

## 2018-09-20 NOTE — TELEPHONE ENCOUNTER
Called patient to break down what the message means and discussed sizes of the nodules. He will await pulmonary calling him to schedule and is aware that we will check in tomorrow to ensure this has happened. He verbalized understanding and seems reassured.   Lindsey Grigsby RN

## 2018-09-21 ENCOUNTER — TELEPHONE (OUTPATIENT)
Dept: SURGERY | Facility: CLINIC | Age: 73
End: 2018-09-21

## 2018-09-21 NOTE — TELEPHONE ENCOUNTER
Call to Garrett Del Rio to discuss the recommendations from nodule conference. There was no answer. Message left with call back information.

## 2018-09-21 NOTE — TELEPHONE ENCOUNTER
Called patient to inform him the lung clinic will call this afternoon. He would like to wait to schedule with PCP until after he knows when he is seeing pulmonary. I will call back this afternoon.  Lindsey Grigsby RN

## 2018-09-21 NOTE — TELEPHONE ENCOUNTER
Garrett's wife returned my call and I was able to explain the recommendations from Nodule Conference. She will await a call with appointment information.

## 2018-09-21 NOTE — TELEPHONE ENCOUNTER
Wife called ERROL Pitt and she will await appt scheduling (see other encounter).  Lindsey Grigsby RN

## 2018-09-21 NOTE — TELEPHONE ENCOUNTER
Lung nodule clinic reached out to patient this AM. Left VM with my call back number. Called Cristina and they had tried the lung nodule clinic back. I gave her the number for the U and also Ernestina Inman's number and my call back number.    Lindsey Grigsby RN

## 2018-09-25 ENCOUNTER — HOSPITAL ENCOUNTER (OUTPATIENT)
Facility: AMBULATORY SURGERY CENTER | Age: 73
End: 2018-09-25
Attending: SURGERY | Admitting: SURGERY
Payer: COMMERCIAL

## 2018-09-25 ENCOUNTER — OFFICE VISIT (OUTPATIENT)
Dept: SURGERY | Facility: CLINIC | Age: 73
End: 2018-09-25
Payer: COMMERCIAL

## 2018-09-25 VITALS
SYSTOLIC BLOOD PRESSURE: 141 MMHG | WEIGHT: 190 LBS | HEIGHT: 70 IN | HEART RATE: 82 BPM | BODY MASS INDEX: 27.2 KG/M2 | DIASTOLIC BLOOD PRESSURE: 78 MMHG

## 2018-09-25 DIAGNOSIS — K21.9 GASTROESOPHAGEAL REFLUX DISEASE, ESOPHAGITIS PRESENCE NOT SPECIFIED: ICD-10-CM

## 2018-09-25 DIAGNOSIS — R91.8 ABNORMAL COMPUTED TOMOGRAPHY OF LUNG: Primary | ICD-10-CM

## 2018-09-25 PROCEDURE — 99215 OFFICE O/P EST HI 40 MIN: CPT | Performed by: SURGERY

## 2018-09-25 NOTE — PATIENT INSTRUCTIONS
Has an obvious right inguinal hernia not sure on left inguinal area, feels like cord lipoma does not move with coughing. .   Testicles are normal.    Skin was warm and pink  Normal Affect    Lower extremity edema is not present.      Assessment: abnormal ct scan of the lungs concerning for infectious or neoplastic.   Has long history of gerd.  Had an EGD done over a year ago.    Has no dysphagia.    Plan to do do EGD and possible biopsy of one of these lymph node(s) .  Patient is having no symptom from the right inguinal hernia and the ct scaN IS MORE CONCERNING.  DO NOT SEE OR FEEL ANY OBVIOUS ORIGINAL SOURCE SO WOULD GET THINGS STARTED FOR A POSSIBLE cancer and if all this is nothing can do the right inguinal hernia laparoscopic robotic or open depending on what is found with the above.   With laparoscopic approach can look in abdomen and see if other reason for his razor blade pain in the lower abdomen. Does not seem to be anything to do with his right inguinal hernia.     Risks of surgery include damage to nerves, bleeding, infection, damage to  Vessels, recurrence.  Although mesh is a better long term repair if it gets infected it must be removed.   If the patient has any bacterial infection the week before and is seen by their doctor and started on antibiotics, I can probably still do the surgery if they are vastly improved by the time of surgery, but if the infection starts closer to the surgery date it will be better to cancel and reschedule to a later date.  A cough will also be hard on the repair and uncomfortable post operative.  If the patient is a smoker I did discuss increase risk of recurrence and more pain with the cough.  If the patient is willing to quit smoking would encourage to do so and start at least a week before surgery.  However, if patient is not going to quit then must understand that his repair is more likely to fail.    Risks of surgery discussed including, but not limited to bleeding,  infection, recurrence, damage to nerves and what is in the hernia sac.  Risks of anesthesia also discussed.    Discussed massaging hernia back in and using ice if becomes more painful.  If not able to reduce then go to emergency room.  Also discussed hernia belt to use until able to get in for surgery.      HERNIORRHAPHY DISCHARGE INSTRUCTIONS  DR. MADELAINE WONG    1. You may resume your regular diet when you feel you are ready to. DO NOT drink alcoholic beverages for 24 hours or while you are taking prescription medication.    2. Limit your activities for the first 48 hours. Gradually, increase them as tolerated. You may use stairs. I encourage you to walk as tolerated. No lifting greater that 20 pounds for 3 weeks.    3. You will have some discomfort at the incision sites. This is expected. This should improve over the next 2-3 days. Ice and pain medication will help with this pain. Use prescribed pain medication as instructed.    4. Bruising and mild swelling is normal after surgery. For males it is common to have bruising going into the penis and scrotum. The area below and around the incision(s) will be hard and elevated. This is normal. I call it the healing ridge. This will resolve slowly over the next several months. If you feel the pain is increasing and cannot explain it by increasing activity please call us at [phone].    5. The dressing will often have some blood on it. You may shower 24 hours after surgery. Clean gently over incision site. If clear plastic covering or steri-strip comes off and there is still some bleeding or drainage then cover with gauze or band-aid. If no bleeding, there is no reason to cover site. The abdominal binder may be removed after 24 hours after surgery. You may continue to wear it however for comfort. I suggest  you wear an old t-shirt under the abdominal binder for a more comfortable wear.    6. Avoid Aspirin for the first 72 hours after the procedure. This  medication may increase the tendency to bleed.    7. Use the following medications (in addition to your normal meds) as shown:  a. Percocet 5 mg 1-2 every 6 hours as needed for severe pain. This contains 325 mg of Tylenol (acetaminophen) per tablet.  Please do not take more than 4 grams of Tylenol (acetaminophen) per day. For example, you may take 1 Percocet and 1 Tylenol, or 2 Percocet and no Tylenol, or 2 Tylenol and no Percocet every 6 hours.  b. Tylenol (acetaminophen) 500 mg every 6 hours as needed for mild pain. Do not take more than 1000 mg every 6 hours. (see above).  c. Motrin (ibuprofen) 200-800 mg every 6 hours as needed for mild to moderate pain. Take with food.     8. Notify Dr. Garcia's clinic at [phone] if:    Your discomfort is not relieved by your pain medication.    You have signs of infection such as temperature above 100.5 degrees orally, chills, or increasing daily discomfort.    Incision site is becoming more red and/or there is purulent drainage.    You have questions or concerns.    9. Please call [phone] to schedule a follow up appointment in about 2 weeks.    10. When taking narcotics (pain medication more than Tylenol [acetaminophen] and Motrin [ibuprofen]) it is important to keep your stools soft to avoid constipation and pain with straining. This is best done by drinking fluids (non-alcoholic and non-caffeinated) and taking a stool softener (i.e. Metamucil or milk of magnesia). You may be able to use non-narcotics for pain relief especially by the 3rd post- operative day. Tylenol (acetaminophen) 500 mg every 6 hours and/or Motrin (ibuprofen) 200-800 mg every 6 hours. Please do not take more than 4 grams of Tylenol (acetaminophen) per day. Remember your Percocet does have Tylenol (acetaminophen) already in it. Please take Motrin (ibuprofen) with food to help protect the stomach. If you have a history of stomach ulcers or stomach problems, do not take Motrin (ibuprofen).      11. Do not drive or operate heavy machinery for 24 hours after surgery or when taking narcotics. You may resume driving when feel that you can safely avoid an accident and are not taking narcotics. This is usually 5 to 7 days after surgery. You should not be alone for 24 hours after surgery.    12. Have milk of magnesia available at home so that when you take the pain medications you take 1-2 teaspoons a day,  to help reduce problems with constipation.

## 2018-09-25 NOTE — TELEPHONE ENCOUNTER
Patient called back and he does not want to schedule an appointment with Dr Talavera at this time.    Iliana Luong

## 2018-09-25 NOTE — LETTER
"    9/25/2018         RE: Garrett Del Rio  [address]  [facility] [address]        Dear Colleague,    Thank you for referring your patient, Garrett Del Rio, to the [facility]. Please see a copy of my visit note below.    Dear Frannie Sutherland  I was asked to see this patient by Frannie Talavera for please see below.  I have seen Garrett Del Rio and as you know his chief complaint is has some pain in the suprapubic area evenly across the lower abdomen and upper abdomen.  Comes and goes.  Worse with moving. And more when first gets up. This may be only 1-2 days a month.  May have been better since was pushed right inguinal hernia back in.  But hard to tell.   Started about a year ago. Has not felt a lump.   Denies nausea, vomitting, diahrrea, constipation   Non smoker was a smoker for about 30 years quit about 30 years ago.   No bladder outlet obstruction symptoms   No chills or fever no weight loss and is chronically tired.   HPI:  Patient is a 72 year old male  with complaints see above  The patient noticed the symptoms about 1 year ago.    Patient has not family history of hernia problems  No moving makes the episode better.  No shingles. In the past.   Has been having reflux for some time.     Review Of Systems  Respiratory: No shortness of breath, dyspnea on exertion, cough, or hemoptysis  Cardiovascular: negative  Gastrointestinal: negative  Endocrine: negative  :  negative  /78  Pulse 82  Ht 1.778 m (5' 10\")  Wt 86.2 kg (190 lb)  BMI 27.26 kg/m2    Past Medical History:   Diagnosis Date     Kidney stone 11/10/2011    Diagnosed by ultrasound 10/24/11 3-4 mm      MEDIAL MENISCUS TEAR - left 10/24/2011       Past Surgical History:   Procedure Laterality Date     CARDIAC STRESS TST,COMPLETE  2006     HC KNEE SCOPE,MED/LAT MENISECTOMY  11/11/11    left, with partial medial menisectomy ONLY       Social History     Social History     Marital status:      " Spouse name: N/A     Number of children: N/A     Years of education: N/A     Occupational History     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 No     Social History Narrative       Current Outpatient Prescriptions   Medication Sig Dispense Refill     Ascorbic Acid (VITAMIN C CR PO) Take  by mouth.       aspirin 81 MG tablet Take 1 tablet by mouth At Bedtime        ASPIRIN PO Take 325 mg by mouth daily 2 tablets daily for arthritis       EPINEPHrine (EPIPEN 2-NETO) 0.3 MG/0.3ML injection Inject 0.3 mLs into the muscle once as needed for anaphylaxis for 1 dose. 2 each 3     Multiple Minerals-Vitamins (CALCIUM-MAGNESIUM-ZINC-D3) TABS Take 1 tablet by mouth 2 times daily       omeprazole (PRILOSEC) 20 MG CR capsule Take 1 capsule (20 mg) by mouth 2 times daily (Patient taking differently: Take 20 mg by mouth daily ) 180 capsule 3     order for DME CPAP 9 cm H20 1 Units 0     RESVERATROL PO Take by mouth daily       Study Result   CT CHEST WITHOUT CONTRAST  9/18/2018 10:25 AM     HISTORY:  New lung nodules seen on abdominal CT. Pulmonary nodules.     TECHNIQUE:  Scans obtained from the apices through the diaphragm  without IV contrast. Radiation dose for this scan was reduced using  automated exposure control, adjustment of the mA and/or kV according  to patient size, or iterative reconstruction technique.     COMPARISON:  CT abdomen and pelvis 9/12/2018, CT chest 3/13/2017 and  2/25/2016.     FINDINGS:  Lungs: Multiple  bilateral pulmonary nodules identified. These are  considered indeterminate. The bibasilar nodules were described on a  recent CT abdomen dated 9/12/2018. There are multiple new nodules  compared to an older study from 3/13/2017. These nodules are seen  bilaterally, involving all lobes of both lungs. One of the larger  nodules localizes to the medial medial posterior right lower lobe base  measuring 0.8 cm series 6 image 216. An adjacent nodule is also seen  at this level measuring 0.7 cm image 215. One of the larger nodules at  the left upper lobe midchest is 0.8 cm series 6 image 113. There are  numerous additional examples bilaterally. Several nodules have a  peripheral distribution.     Additional findings: There are several prominent lymph nodes seen in  the mediastinum. Subcarinal lymph node measures 3.7 x 1.1 cm,  previously 3.2 x 0.9 cm series 2 image 29. Right paratracheal stable  lymph node is 1 x 0.8 cm image 16. Stable coronary artery  calcifications. Stable thoracic aortic calcifications. No effusions.  Upper abdomen images shows a degree of fatty infiltration of the  liver. A small cyst posterior right liver is stable series 2 image 53.  No aggressive appearing thoracic bony lesions.         IMPRESSION:    1. Multiple bilateral indeterminate pulmonary nodules seen within all  lobes of both lungs. These are new since 3/13/2017. Therefore, these  are worrisome for a neoplastic etiology until proven otherwise and  further oncologic workup is recommended. Surveillance imaging  recommended as well.  2. A few prominent mediastinal lymph nodes are technically  indeterminate. A subcarinal lymph node measures slightly larger since  2017 while others appear stable.     SHAHRIAR DUARTE MD     Study Result   CT ABDOMEN/PELVIS WITH CONTRAST September 12, 2018 11:27 AM      HISTORY: Lower abdominal pain.     TECHNIQUE: 93 mL Isovue-370. CT images of the abdomen and pelvis  following nonionic intravenous contrast.  Radiation dose for this scan  was reduced using automated exposure control, adjustment of the mA  and/or kV according to patient size, or iterative reconstruction  technique.     COMPARISON: 12/16/2008, 3/13/2017.     FINDINGS: 8 mm nodule in the posterior right lower lobe (series 2,  image 8). 4 mm nodule also in the right costophrenic angle (series 2,  image 12). 7 mm nodule in the left lower lobe (series 2, image 7). 5  mm nodule in the lingula (series 2, image 3). These nodules are new  compared to 3/13/2017. Subcentimeter hypodensity in segment 2 of the  liver is unchanged. No new liver abnormality. The gallbladder, spleen,  pancreas, and adrenal glands appear normal. No hydronephrosis or solid  renal mass. No abdominal or retroperitoneal lymphadenopathy. There is  aortic atherosclerosis without aneurysm. Sigmoid diverticulosis  present without evidence of acute diverticulitis. There is a right  inguinal hernia that contains small bowel but does not cause  obstruction. No pelvic adenopathy or free fluid. Prostate is mildly  enlarged and centrally calcified. Visualized bones are unremarkable.         IMPRESSION:  1. No acute abnormality in the abdomen or pelvis.  2. Bowel containing right inguinal hernia without evidence of  incarceration or obstruction.  3. Sigmoid diverticulosis without evidence of diverticulitis.  4. Bilateral pulmonary nodules are present, measuring up to 8 mm.  These were not seen on a prior CT of the chest obtained 3/13/2017.  Differential diagnosis includes metastatic disease and infection. CT  scan of the chest recommended.     [Access Center: New bilateral pulmonary nodules.]     This report will be copied to the [facility] [place] to ensure a  provider is contacted. Access Center is available Monday through  Friday 8am-3:30 pm.      ESTIVEN BUSBY MD   Component Results     10 Point review of systems all others are negative.   Above was reviewed  Physical exam: /78  Pulse  "82  Ht 1.778 m (5' 10\")  Wt 86.2 kg (190 lb)  BMI 27.26 kg/m2   Patient able to get up on table without difficulty.   Patient is alert and orientated.   Head eyes, nose and mouth within normal limits.  No supraclavicular or cervical adenopathy palpated.  No axillary lymph node(s) noted.   Thyroid within normal limits.  No carotid bruits auscultated.  Lungs are clear to auscultation  Heart is regular rate and rhythm with no murmur or thrills noted.  No costal vertebral angle tenderness noted.  Abdomen is abdomen is soft without significant tenderness, masses, organomegaly or guarding  bowel sounds are positive and no caput medusa noted.  Has an obvious right inguinal hernia not sure on left inguinal area, feels like cord lipoma does not move with coughing. .   Testicles are normal.    Skin was warm and pink  Normal Affect    Lower extremity edema is not present.      Assessment: abnormal ct scan of the lungs concerning for infectious or neoplastic.   Has long history of gerd.  Had an EGD done over a year ago.    Has no dysphagia.    Plan to do do EGD and possible biopsy of one of these lymph node(s) .  Patient is having no symptom from the right inguinal hernia and the ct scaN IS MORE CONCERNING.  DO NOT SEE OR FEEL ANY OBVIOUS ORIGINAL SOURCE SO WOULD GET THINGS STARTED FOR A POSSIBLE cancer and if all this is nothing can do the right inguinal hernia laparoscopic robotic or open depending on what is found with the above.   With laparoscopic approach can look in abdomen and see if other reason for his razor blade pain in the lower abdomen. Does not seem to be anything to do with his right inguinal hernia.     Risks of surgery include damage to nerves, bleeding, infection, damage to  Vessels, recurrence.  Although mesh is a better long term repair if it gets infected it must be removed.   If the patient has any bacterial infection the week before and is seen by their doctor and started on antibiotics, I can " probably still do the surgery if they are vastly improved by the time of surgery, but if the infection starts closer to the surgery date it will be better to cancel and reschedule to a later date.  A cough will also be hard on the repair and uncomfortable post operative.  If the patient is a smoker I did discuss increase risk of recurrence and more pain with the cough.  If the patient is willing to quit smoking would encourage to do so and start at least a week before surgery.  However, if patient is not going to quit then must understand that his repair is more likely to fail.    Risks of surgery discussed including, but not limited to bleeding, infection, recurrence, damage to nerves and what is in the hernia sac.  Risks of anesthesia also discussed.    Discussed massaging hernia back in and using ice if becomes more painful.  If not able to reduce then go to emergency room.  Also discussed hernia belt to use until able to get in for surgery.    Time spent with the patient with greater that 50% of the time in discussion was 50 minutes.  In discussing the ct scan and the hernia.      James Garcia MD      Again, thank you for allowing me to participate in the care of your patient.        Sincerely,        James Garcia MD

## 2018-09-25 NOTE — PROGRESS NOTES
"Dear Frannie Sutherland  I was asked to see this patient by Frannie Talavera for please see below.  I have seen Garrett Del Rio and as you know his chief complaint is has some pain in the suprapubic area evenly across the lower abdomen and upper abdomen.  Comes and goes.  Worse with moving. And more when first gets up. This may be only 1-2 days a month.  May have been better since was pushed right inguinal hernia back in.  But hard to tell.   Started about a year ago. Has not felt a lump.   Denies nausea, vomitting, diahrrea, constipation   Non smoker was a smoker for about 30 years quit about 30 years ago.   No bladder outlet obstruction symptoms   No chills or fever no weight loss and is chronically tired.   HPI:  Patient is a 72 year old male  with complaints see above  The patient noticed the symptoms about 1 year ago.    Patient has not family history of hernia problems  No moving makes the episode better.  No shingles. In the past.   Has been having reflux for some time.     Review Of Systems  Respiratory: No shortness of breath, dyspnea on exertion, cough, or hemoptysis  Cardiovascular: negative  Gastrointestinal: negative  Endocrine: negative  :  negative  /78  Pulse 82  Ht 1.778 m (5' 10\")  Wt 86.2 kg (190 lb)  BMI 27.26 kg/m2    Past Medical History:   Diagnosis Date     Kidney stone 11/10/2011    Diagnosed by ultrasound 10/24/11 3-4 mm      MEDIAL MENISCUS TEAR - left 10/24/2011       Past Surgical History:   Procedure Laterality Date     CARDIAC STRESS TST,COMPLETE  2006     HC KNEE SCOPE,MED/LAT MENISECTOMY  11/11/11    left, with partial medial menisectomy ONLY       Social History     Social History     Marital status:      Spouse name: N/A     Number of children: N/A     Years of education: N/A     Occupational History     Business owner Retired     manufactured fluid guages     Drives school Bus      Social History Main Topics     Smoking status: Former Smoker    "  Packs/day: 1.00     Years: 35.00     Types: Cigarettes, Cigars, Pipe     Start date: 6/6/1963     Quit date: 10/13/1998     Smokeless tobacco: Never Used     Alcohol use Yes      Comment: about a beer a week      Drug use: No     Sexual activity: Not Currently     Partners: Female     Birth control/ protection: None     Other Topics Concern     Parent/Sibling W/ Cabg, Mi Or Angioplasty Before 65f 55m? No     Social History Narrative       Current Outpatient Prescriptions   Medication Sig Dispense Refill     Ascorbic Acid (VITAMIN C CR PO) Take  by mouth.       aspirin 81 MG tablet Take 1 tablet by mouth At Bedtime        ASPIRIN PO Take 325 mg by mouth daily 2 tablets daily for arthritis       EPINEPHrine (EPIPEN 2-NETO) 0.3 MG/0.3ML injection Inject 0.3 mLs into the muscle once as needed for anaphylaxis for 1 dose. 2 each 3     Multiple Minerals-Vitamins (CALCIUM-MAGNESIUM-ZINC-D3) TABS Take 1 tablet by mouth 2 times daily       omeprazole (PRILOSEC) 20 MG CR capsule Take 1 capsule (20 mg) by mouth 2 times daily (Patient taking differently: Take 20 mg by mouth daily ) 180 capsule 3     order for DME CPAP 9 cm H20 1 Units 0     RESVERATROL PO Take by mouth daily       Study Result   CT CHEST WITHOUT CONTRAST  9/18/2018 10:25 AM     HISTORY:  New lung nodules seen on abdominal CT. Pulmonary nodules.     TECHNIQUE:  Scans obtained from the apices through the diaphragm  without IV contrast. Radiation dose for this scan was reduced using  automated exposure control, adjustment of the mA and/or kV according  to patient size, or iterative reconstruction technique.     COMPARISON:  CT abdomen and pelvis 9/12/2018, CT chest 3/13/2017 and  2/25/2016.     FINDINGS:  Lungs: Multiple bilateral pulmonary nodules identified. These are  considered indeterminate. The bibasilar nodules were described on a  recent CT abdomen dated 9/12/2018. There are multiple new nodules  compared to an older study from 3/13/2017. These nodules are  seen  bilaterally, involving all lobes of both lungs. One of the larger  nodules localizes to the medial medial posterior right lower lobe base  measuring 0.8 cm series 6 image 216. An adjacent nodule is also seen  at this level measuring 0.7 cm image 215. One of the larger nodules at  the left upper lobe midchest is 0.8 cm series 6 image 113. There are  numerous additional examples bilaterally. Several nodules have a  peripheral distribution.     Additional findings: There are several prominent lymph nodes seen in  the mediastinum. Subcarinal lymph node measures 3.7 x 1.1 cm,  previously 3.2 x 0.9 cm series 2 image 29. Right paratracheal stable  lymph node is 1 x 0.8 cm image 16. Stable coronary artery  calcifications. Stable thoracic aortic calcifications. No effusions.  Upper abdomen images shows a degree of fatty infiltration of the  liver. A small cyst posterior right liver is stable series 2 image 53.  No aggressive appearing thoracic bony lesions.         IMPRESSION:    1. Multiple bilateral indeterminate pulmonary nodules seen within all  lobes of both lungs. These are new since 3/13/2017. Therefore, these  are worrisome for a neoplastic etiology until proven otherwise and  further oncologic workup is recommended. Surveillance imaging  recommended as well.  2. A few prominent mediastinal lymph nodes are technically  indeterminate. A subcarinal lymph node measures slightly larger since  2017 while others appear stable.     SHAHRIAR DUARTE MD     Study Result   CT ABDOMEN/PELVIS WITH CONTRAST September 12, 2018 11:27 AM      HISTORY: Lower abdominal pain.     TECHNIQUE: 93 mL Isovue-370. CT images of the abdomen and pelvis  following nonionic intravenous contrast. Radiation dose for this scan  was reduced using automated exposure control, adjustment of the mA  and/or kV according to patient size, or iterative reconstruction  technique.     COMPARISON: 12/16/2008, 3/13/2017.     FINDINGS: 8 mm nodule in the  "posterior right lower lobe (series 2,  image 8). 4 mm nodule also in the right costophrenic angle (series 2,  image 12). 7 mm nodule in the left lower lobe (series 2, image 7). 5  mm nodule in the lingula (series 2, image 3). These nodules are new  compared to 3/13/2017. Subcentimeter hypodensity in segment 2 of the  liver is unchanged. No new liver abnormality. The gallbladder, spleen,  pancreas, and adrenal glands appear normal. No hydronephrosis or solid  renal mass. No abdominal or retroperitoneal lymphadenopathy. There is  aortic atherosclerosis without aneurysm. Sigmoid diverticulosis  present without evidence of acute diverticulitis. There is a right  inguinal hernia that contains small bowel but does not cause  obstruction. No pelvic adenopathy or free fluid. Prostate is mildly  enlarged and centrally calcified. Visualized bones are unremarkable.         IMPRESSION:  1. No acute abnormality in the abdomen or pelvis.  2. Bowel containing right inguinal hernia without evidence of  incarceration or obstruction.  3. Sigmoid diverticulosis without evidence of diverticulitis.  4. Bilateral pulmonary nodules are present, measuring up to 8 mm.  These were not seen on a prior CT of the chest obtained 3/13/2017.  Differential diagnosis includes metastatic disease and infection. CT  scan of the chest recommended.     [Access Center: New bilateral pulmonary nodules.]     This report will be copied to the [facility] [place] to ensure a  provider is contacted. Access Center is available Monday through  Friday 8am-3:30 pm.      ESTIVEN BUSBY MD   Component Results     10 Point review of systems all others are negative.   Above was reviewed  Physical exam: /78  Pulse 82  Ht 1.778 m (5' 10\")  Wt 86.2 kg (190 lb)  BMI 27.26 kg/m2   Patient able to get up on table without difficulty.   Patient is alert and orientated.   Head eyes, nose and mouth within normal limits.  No supraclavicular or cervical adenopathy " palpated.  No axillary lymph node(s) noted.   Thyroid within normal limits.  No carotid bruits auscultated.  Lungs are clear to auscultation  Heart is regular rate and rhythm with no murmur or thrills noted.  No costal vertebral angle tenderness noted.  Abdomen is abdomen is soft without significant tenderness, masses, organomegaly or guarding  bowel sounds are positive and no caput medusa noted.  Has an obvious right inguinal hernia not sure on left inguinal area, feels like cord lipoma does not move with coughing. .   Testicles are normal.    Skin was warm and pink  Normal Affect    Lower extremity edema is not present.      Assessment: abnormal ct scan of the lungs concerning for infectious or neoplastic.   Has long history of gerd.  Had an EGD done over a year ago.    Has no dysphagia.    Plan to do do EGD and possible biopsy of one of these lymph node(s) .  Patient is having no symptom from the right inguinal hernia and the ct scaN IS MORE CONCERNING.  DO NOT SEE OR FEEL ANY OBVIOUS ORIGINAL SOURCE SO WOULD GET THINGS STARTED FOR A POSSIBLE cancer and if all this is nothing can do the right inguinal hernia laparoscopic robotic or open depending on what is found with the above.   With laparoscopic approach can look in abdomen and see if other reason for his razor blade pain in the lower abdomen. Does not seem to be anything to do with his right inguinal hernia.     Risks of surgery include damage to nerves, bleeding, infection, damage to  Vessels, recurrence.  Although mesh is a better long term repair if it gets infected it must be removed.   If the patient has any bacterial infection the week before and is seen by their doctor and started on antibiotics, I can probably still do the surgery if they are vastly improved by the time of surgery, but if the infection starts closer to the surgery date it will be better to cancel and reschedule to a later date.  A cough will also be hard on the repair and  uncomfortable post operative.  If the patient is a smoker I did discuss increase risk of recurrence and more pain with the cough.  If the patient is willing to quit smoking would encourage to do so and start at least a week before surgery.  However, if patient is not going to quit then must understand that his repair is more likely to fail.    Risks of surgery discussed including, but not limited to bleeding, infection, recurrence, damage to nerves and what is in the hernia sac.  Risks of anesthesia also discussed.    Discussed massaging hernia back in and using ice if becomes more painful.  If not able to reduce then go to emergency room.  Also discussed hernia belt to use until able to get in for surgery.    Time spent with the patient with greater that 50% of the time in discussion was 50 minutes.  In discussing the ct scan and the hernia.      James Garcia MD

## 2018-09-25 NOTE — MR AVS SNAPSHOT
After Visit Summary   9/25/2018    Garrett Del Rio    MRN: [id number]           Patient Information     Date Of Birth          1945        Visit Information        Provider Department      9/25/2018 1:00 PM James Garcia MD [facility]        Today's Diagnoses     Abnormal computed tomography of lung    -  1    Gastroesophageal reflux disease, esophagitis presence not specified          Care Instructions    Has an obvious right inguinal hernia not sure on left inguinal area, feels like cord lipoma does not move with coughing. .   Testicles are normal.    Skin was warm and pink  Normal Affect    Lower extremity edema is not present.      Assessment: abnormal ct scan of the lungs concerning for infectious or neoplastic.   Has long history of gerd.  Had an EGD done over a year ago.    Has no dysphagia.    Plan to do do EGD and possible biopsy of one of these lymph node(s) .  Patient is having no symptom from the right inguinal hernia and the ct scaN IS MORE CONCERNING.  DO NOT SEE OR FEEL ANY OBVIOUS ORIGINAL SOURCE SO WOULD GET THINGS STARTED FOR A POSSIBLE cancer and if all this is nothing can do the right inguinal hernia laparoscopic robotic or open depending on what is found with the above.   With laparoscopic approach can look in abdomen and see if other reason for his razor blade pain in the lower abdomen. Does not seem to be anything to do with his right inguinal hernia.     Risks of surgery include damage to nerves, bleeding, infection, damage to  Vessels, recurrence.  Although mesh is a better long term repair if it gets infected it must be removed.   If the patient has any bacterial infection the week before and is seen by their doctor and started on antibiotics, I can probably still do the surgery if they are vastly improved by the time of surgery, but if the infection starts closer to the surgery date it will be better to cancel and reschedule to a later date.  A  cough will also be hard on the repair and uncomfortable post operative.  If the patient is a smoker I did discuss increase risk of recurrence and more pain with the cough.  If the patient is willing to quit smoking would encourage to do so and start at least a week before surgery.  However, if patient is not going to quit then must understand that his repair is more likely to fail.    Risks of surgery discussed including, but not limited to bleeding, infection, recurrence, damage to nerves and what is in the hernia sac.  Risks of anesthesia also discussed.    Discussed massaging hernia back in and using ice if becomes more painful.  If not able to reduce then go to emergency room.  Also discussed hernia belt to use until able to get in for surgery.      HERNIORRHAPHY DISCHARGE INSTRUCTIONS  DR. MADELAINE WONG    1. You may resume your regular diet when you feel you are ready to. DO NOT drink alcoholic beverages for 24 hours or while you are taking prescription medication.    2. Limit your activities for the first 48 hours. Gradually, increase them as tolerated. You may use stairs. I encourage you to walk as tolerated. No lifting greater that 20 pounds for 3 weeks.    3. You will have some discomfort at the incision sites. This is expected. This should improve over the next 2-3 days. Ice and pain medication will help with this pain. Use prescribed pain medication as instructed.    4. Bruising and mild swelling is normal after surgery. For males it is common to have bruising going into the penis and scrotum. The area below and around the incision(s) will be hard and elevated. This is normal. I call it the healing ridge. This will resolve slowly over the next several months. If you feel the pain is increasing and cannot explain it by increasing activity please call us at [phone].    5. The dressing will often have some blood on it. You may shower 24 hours after surgery. Clean gently over incision site. If clear  plastic covering or steri-strip comes off and there is still some bleeding or drainage then cover with gauze or band-aid. If no bleeding, there is no reason to cover site. The abdominal binder may be removed after 24 hours after surgery. You may continue to wear it however for comfort. I suggest  you wear an old t-shirt under the abdominal binder for a more comfortable wear.    6. Avoid Aspirin for the first 72 hours after the procedure. This medication may increase the tendency to bleed.    7. Use the following medications (in addition to your normal meds) as shown:  a. Percocet 5 mg 1-2 every 6 hours as needed for severe pain. This contains 325 mg of Tylenol (acetaminophen) per tablet.  Please do not take more than 4 grams of Tylenol (acetaminophen) per day. For example, you may take 1 Percocet and 1 Tylenol, or 2 Percocet and no Tylenol, or 2 Tylenol and no Percocet every 6 hours.  b. Tylenol (acetaminophen) 500 mg every 6 hours as needed for mild pain. Do not take more than 1000 mg every 6 hours. (see above).  c. Motrin (ibuprofen) 200-800 mg every 6 hours as needed for mild to moderate pain. Take with food.     8. Notify Dr. Garcia's clinic at [phone] if:    Your discomfort is not relieved by your pain medication.    You have signs of infection such as temperature above 100.5 degrees orally, chills, or increasing daily discomfort.    Incision site is becoming more red and/or there is purulent drainage.    You have questions or concerns.    9. Please call [phone] to schedule a follow up appointment in about 2 weeks.    10. When taking narcotics (pain medication more than Tylenol [acetaminophen] and Motrin [ibuprofen]) it is important to keep your stools soft to avoid constipation and pain with straining. This is best done by drinking fluids (non-alcoholic and non-caffeinated) and taking a stool softener (i.e. Metamucil or milk of magnesia). You may be able to use non-narcotics for pain relief  especially by the 3rd post- operative day. Tylenol (acetaminophen) 500 mg every 6 hours and/or Motrin (ibuprofen) 200-800 mg every 6 hours. Please do not take more than 4 grams of Tylenol (acetaminophen) per day. Remember your Percocet does have Tylenol (acetaminophen) already in it. Please take Motrin (ibuprofen) with food to help protect the stomach. If you have a history of stomach ulcers or stomach problems, do not take Motrin (ibuprofen).     11. Do not drive or operate heavy machinery for 24 hours after surgery or when taking narcotics. You may resume driving when feel that you can safely avoid an accident and are not taking narcotics. This is usually 5 to 7 days after surgery. You should not be alone for 24 hours after surgery.    12. Have milk of magnesia available at home so that when you take the pain medications you take 1-2 teaspoons a day,  to help reduce problems with constipation.                Follow-ups after your visit        Additional Services     GASTROENTEROLOGY ADULT REF PROCEDURE ONLY       Last Lab Result: Creatinine (mg/dL)       Date                     Value                 06/07/2017               0.94             ----------  Body mass index is 27.26 kg/(m^2).      Patient will be contacted to schedule procedure.     Please be aware that coverage of these services is subject to the terms and limitations of your health insurance plan.  Call member services at your health plan with any benefit or coverage questions.  Any procedures must be performed at a [facility] facility OR coordinated by your clinic's referral office.    Please bring the following with you to your appointment:    (1) Any X-Rays, CTs or MRIs which have been performed.  Contact the facility where they were done to arrange for  prior to your scheduled appointment.    (2) List of current medications   (3) This referral request   (4) Any documents/labs given to you for this referral            Lung Nodule Program  Referral Location: [facility] - [phone]       Please be aware that coverage of these services is subject to the terms and limitations of your health insurance plan. Call member services at your health plan with any benefit or coverage questions.     For [facility] and [place] locations, you will be contacted within 1-2 business days to schedule your appointment, if you haven't heard from us please call the number above. For Other referrals, please call the location directly.                 OTOLARYNGOLOGY REFERRAL       Your provider has referred you to: [facility]: [facility] [phone]   [email or web address]    Please be aware that coverage of these services is subject to the terms and limitations of your health insurance plan.  Call member services at your health plan with any benefit or coverage questions.      Please bring the following with you to your appointment:    (1) Any X-Rays, CTs or MRIs which have been performed.  Contact the facility where they were done to arrange for  prior to your scheduled appointment.   (2) List of current medications  (3) This referral request   (4) Any documents/labs given to you for this referral                  Your next 10 appointments already scheduled     Nov 12, 2018  1:00 PM CST   New Visit with Jorge Lopez MD   [facility]    [address] [place] [id number] [phone]              Who to contact     If you have questions or need follow up information about today's clinic visit or your schedule please contact [facility] directly at [phone].  Normal or non-critical lab and imaging results will be communicated to you by MyChart, letter or phone within 4 business days after the clinic has received the results. If you do not hear from us within 7 days, please contact the clinic through  "[employer] or phone. If you have a critical or abnormal lab result, we will notify you by phone as soon as possible.  Submit refill requests through [employer] or call your pharmacy and they will forward the refill request to us. Please allow 3 business days for your refill to be completed.          Additional Information About Your Visit        [employer] Information     [employer] gives you secure access to your electronic health record. If you see a primary care provider, you can also send messages to your care team and make appointments. If you have questions, please call your primary care clinic.  If you do not have a primary care provider, please call [phone] and they will assist you.        Care EveryWhere ID     This is your Care EveryWhere ID. This could be used by other organizations to access your [facility] medical records  [id number]        Your Vitals Were     Pulse Height BMI (Body Mass Index)             82 1.778 m (5' 10\") 27.26 kg/m2          Blood Pressure from Last 3 Encounters:   09/25/18 141/78   09/10/18 130/76   09/05/18 140/84    Weight from Last 3 Encounters:   09/25/18 86.2 kg (190 lb)   09/10/18 85.7 kg (189 lb)   09/05/18 85.9 kg (189 lb 6.4 oz)              We Performed the Following     GASTROENTEROLOGY ADULT REF PROCEDURE ONLY     Lung Nodule Program Referral Location: [facility] - [phone]     OTOLARYNGOLOGY REFERRAL          Today's Medication Changes          These changes are accurate as of 9/25/18  1:41 PM.  If you have any questions, ask your nurse or doctor.               These medicines have changed or have updated prescriptions.        Dose/Directions    omeprazole 20 MG CR capsule   Commonly known as:  priLOSEC   This may have changed:  when to take this   Used for:  Gastroesophageal reflux disease without esophagitis        Dose:  20 mg   Take 1 capsule (20 mg) by mouth 2 times daily   Quantity:  180 capsule   Refills:  3                Primary Care " Provider Office Phone # Fax #    Frannie Maulik Talavera [phone] [address] [facility] [address]        Equal Access to Services     MALLIKA BARKLEY : Hadii aad ku hadrosioo Sosameerali, waaxda luqadaha, qaybta kaalmada adegarretda, veronica avila laGloriabyron nichols. So [facility] [phone].    ATENCIÓN: Si habla español, tiene a del toro disposición servicios gratuitos de asistencia lingüística. Llame al [phone].    We comply with applicable federal civil rights laws and [place] laws. We do not discriminate on the basis of race, color, national origin, age, disability, sex, sexual orientation, or gender identity.            Thank you!     Thank you for choosing [facility]  for your care. Our goal is always to provide you with excellent care. Hearing back from our patients is one way we can continue to improve our services. Please take a few minutes to complete the written survey that you may receive in the mail after your visit with us. Thank you!             Your Updated Medication List - Protect others around you: Learn how to safely use, store and throw away your medicines at www.disposemymeds.org.          This list is accurate as of 9/25/18  1:41 PM.  Always use your most recent med list.                   Brand Name Dispense Instructions for use Diagnosis    * aspirin 81 MG tablet      Take 1 tablet by mouth At Bedtime        * ASPIRIN PO      Take 325 mg by mouth daily 2 tablets daily for arthritis        Calcium-Magnesium-Zinc-D3 Tabs      Take 1 tablet by mouth 2 times daily        EPINEPHrine 0.3 MG/0.3ML injection    EPIPEN 2-NETO    2 each    Inject 0.3 mLs into the muscle once as needed for anaphylaxis for 1 dose.    Exposure       omeprazole 20 MG CR capsule    priLOSEC    180 capsule    Take 1 capsule (20 mg) by mouth 2 times daily    Gastroesophageal reflux disease without esophagitis       order for DME     1 Units    CPAP 9 cm H20    ADOLFO  (obstructive sleep apnea)       RESVERATROL PO      Take by mouth daily        VITAMIN C CR PO      Take  by mouth.        * Notice:  This list has 2 medication(s) that are the same as other medications prescribed for you. Read the directions carefully, and ask your doctor or other care provider to review them with you.

## 2018-09-26 ENCOUNTER — OFFICE VISIT (OUTPATIENT)
Dept: OTOLARYNGOLOGY | Facility: CLINIC | Age: 73
End: 2018-09-26
Payer: COMMERCIAL

## 2018-09-26 VITALS
HEART RATE: 65 BPM | HEIGHT: 70 IN | WEIGHT: 190 LBS | BODY MASS INDEX: 27.2 KG/M2 | SYSTOLIC BLOOD PRESSURE: 150 MMHG | DIASTOLIC BLOOD PRESSURE: 77 MMHG | OXYGEN SATURATION: 100 %

## 2018-09-26 DIAGNOSIS — K21.9 LPRD (LARYNGOPHARYNGEAL REFLUX DISEASE): Primary | ICD-10-CM

## 2018-09-26 DIAGNOSIS — R91.8 PULMONARY NODULES: ICD-10-CM

## 2018-09-26 PROCEDURE — 99204 OFFICE O/P NEW MOD 45 MIN: CPT | Mod: 25 | Performed by: OTOLARYNGOLOGY

## 2018-09-26 PROCEDURE — 31575 DIAGNOSTIC LARYNGOSCOPY: CPT | Performed by: OTOLARYNGOLOGY

## 2018-09-26 NOTE — LETTER
9/26/2018         RE: Garrett Del Rio  [address]  [place] [address]        Dear Colleague,    Thank you for referring your patient, Garrett Del Rio, to the [facility]. Please see a copy of my visit note below.    I am seeing this patient in consultation for lung nodules, rule-out head and neck cancer at the request of the provider Dr. James Garcia.       Chief Complaint - head and neck cancer evaluation    History of Present Illness - Garrett Del Rio is a 72 year old male who presents with a history of lung nodules on CT chest. He was referred to r/o head and neck malignancy. No throat pain, dysphagia, odynaphagia. Voicing has seemed normal. No cough. No hemoptysis. The patient denies any recent intubations or throat procedures. Had EGD for reflux and that was okay. Former smoker (quit 25 years ago). Has had sinusitis in the past. No symptoms now.    Past Medical History -   Patient Active Problem List   Diagnosis     GERD (gastroesophageal reflux disease)     Esophageal spasms     CARDIOVASCULAR SCREENING; LDL GOAL LESS THAN 160     Hypertension goal BP (blood pressure) < 140/90     OA (OSTEOARTHRITIS) OF KNEE - left     Prostate cancer screening     ADOLFO (obstructive sleep apnea)- moderate (AHI 21)     Mantoux: positive     Advanced directives, counseling/discussion     Hiatal hernia     Colon polyp     Diverticulosis of large intestine     Psychophysiologic insomnia     Nonalcoholic fatty liver disease     Lower abdominal pain       Current Medications -   Current Outpatient Prescriptions:      Ascorbic Acid (VITAMIN C CR PO), Take  by mouth., Disp: , Rfl:      aspirin 81 MG tablet, Take 1 tablet by mouth At Bedtime , Disp: , Rfl:      ASPIRIN PO, Take 325 mg by mouth daily 2 tablets daily for arthritis, Disp: , Rfl:      EPINEPHrine (EPIPEN 2-NETO) 0.3 MG/0.3ML injection, Inject 0.3 mLs into the muscle once as needed for anaphylaxis for 1 dose., Disp: 2 each, Rfl: 3     Multiple  Minerals-Vitamins (CALCIUM-MAGNESIUM-ZINC-D3) TABS, Take 1 tablet by mouth 2 times daily, Disp: , Rfl:      omeprazole (PRILOSEC) 20 MG CR capsule, Take 1 capsule (20 mg) by mouth 2 times daily (Patient taking differently: Take 20 mg by mouth daily ), Disp: 180 capsule, Rfl: 3     order for DME, CPAP 9 cm H20, Disp: 1 Units, Rfl: 0     RESVERATROL PO, Take by mouth daily, Disp: , Rfl:     Allergies -   Allergies   Allergen Reactions     Septra [Sulfamethoxazole W-Trimethoprim]        Social History -   Social History     Social History     Marital status:      Spouse name: N/A     Number of children: N/A     Years of education: N/A     Occupational History     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 No     Social History Narrative       Family History -   Family History   Problem Relation Age of Onset     Prostate Cancer Father      C.A.D. Father      Diabetes Paternal Grandfather      Hypertension No family hx of      Cancer No family hx of        Review of Systems - As per HPI and PMHx, otherwise 10+ comprehensive system review is negative.    Physical Exam  /77  General - The patient is in no distress. Alert and oriented to person and place, answers questions and cooperates with examination appropriately.   Voice and Breathing - The patient was breathing comfortably without the use of accessory muscles. There was no wheezing, stridor, or stertor.  The patients voice was clear and strong.  Ears -  tympanic membranes intact, no effusions, no masses. Normal canals.   Eyes - Extraocular movements intact.  Sclera were not icteric or injected, conjunctiva were pink and moist.  Mouth - Examination of the oral cavity showed pink, healthy oral mucosa. No lesions or ulcerations noted.  The tongue was mobile and midline.  Throat - The walls of the oropharynx were smooth, symmetric, and had no lesions or ulcerations.  The tonsillar pillars and soft palate were symmetric.  The uvula was midline on elevation. Tonsils 0+. No postnasal drainage.  Nose - External contour is symmetric, no gross deflection or scars.  Nasal mucosa is pink and moist with no abnormal mucus.  The septum was midline and non-obstructive, turbinates of normal size and position.  No polyps, masses, or purulence noted on examination.  Neck -  Palpation of the occipital, submental, submandibular, internal jugular chain, and supraclavicular nodes did not demonstrate any abnormal lymph nodes or masses. No parotid masses. Palpation of the thyroid was soft and smooth, with no nodules or goiter appreciated.  The trachea was mobile and midline.  Neurologic - CN II-XII are grossly intact, no focal neurologic deficits.   Cardiovascular - carotid pulses are 2+ bilaterally, regular rhythm    Procedure: Flexible Endoscopy  Indication: lung nodules, rule-out head and neck cancer    To best visualize the upper airway anatomy and due to the chief complaint and HPI, I proceeded with a fiberoptic examination. Color photographs were taken for the medical record. First I sprayed both sides of the nose with a mixture of lidocaine and neosynephrine.  I then passed the scope through the right nasal cavity.  The nasal cavity was unremarkable. No masses or lesions. The nasopharynx was mucosally covered and symmetric.  The Eustachian tube openings were unobstructed.  Going further down I had a clear view of the base of tongue which had normal appearing lingual tonsillar  tissue.  The base of tongue was free of lesions, masses, and the vallecula was open.  The epiglottis was smooth and mucosally covered.  The supraglottic larynx was then clearly visualized. It was normal. No masses or erythema. No lesions. The vocal cords moved smoothly and symmetrically, mild erythema of the left cord, possibly due to reflux. The pyriform sinuses were open, and the limited view of the postcricoid region did not show any lesions. I then entered the left nasal cavity. It was normal. No masses, pus, lesions.       A/P - Garrett Del Rio is a 72 year old male with new pulmonary lung nodules. He was referred to verify there is no evidence of a head and neck cancer primary. His exam showed no evidence of tumor. He has mild left vocal cord erythema, no masses. The erythema maybe laryngopharyngeal reflux. Per his reports his EGD was okay. He takes prilosec and it seems to control symptoms. I worry if he has metastatic disease it is from a non head and neck primary.         Dr. Kashif Perry  Otolaryngology  [facility]      Again, thank you for allowing me to participate in the care of your patient.        Sincerely,        Kashif Perry MD

## 2018-09-26 NOTE — MR AVS SNAPSHOT
After Visit Summary   9/26/2018    Garrett Del Rio    MRN: [id number]           Patient Information     Date Of Birth          1945        Visit Information        Provider Department      9/26/2018 11:15 AM Kashif Perry MD [facility]        Today's Diagnoses     LPRD (laryngopharyngeal reflux disease)    -  1    Pulmonary nodules          Care Instructions    General Scheduling Information  To schedule your CT/MRI scan, please contact [place] [facility] at [phone] [address] [place] [address]    To schedule your Surgery, please contact our Specialty Schedulers at [phone]    ENT Clinic Locations Clinic Hours Telephone Number     [facility]  [address]  [place] [address]   Tuesday:       8:00am -- 4:00pm    Wednesday:  8:00am - 4:00pm   To schedule an appointment with   Dr. Perry,   please contact our   Specialty Scheduling Department at:     [phone]       [facility]  [address] [address]   Friday:          8:00am - 4:00pm         Urgent Care Locations Clinic Hours Telephone Numbers     [facility]  [address] [place] [address]     Monday-Friday:     11:00pm - 9:00pm    Saturday-Sunday:  9:00am - 5:00pm   [phone]     [facility]  [address] [address]     Monday-Friday:      5:00pm - 9:00pm     Saturday-Sunday:  9:00am - 5:00pm   [phone]               Follow-ups after your visit        Your next 10 appointments already scheduled     Oct 08, 2018   Procedure with James Garcia MD   [facility] (--)    [address]  [facility] [id number] [phone]            Nov 12, 2018  1:00 PM CST   New Visit with Jorge Lopez MD   [facility]    [address]  [facility] [id number] [phone]              Who to contact     If you have questions or need follow up  "information about today's clinic visit or your schedule please contact [facility] directly at [phone].  Normal or non-critical lab and imaging results will be communicated to you by MyChart, letter or phone within 4 business days after the clinic has received the results. If you do not hear from us within 7 days, please contact the clinic through [employer] or phone. If you have a critical or abnormal lab result, we will notify you by phone as soon as possible.  Submit refill requests through [employer] or call your pharmacy and they will forward the refill request to us. Please allow 3 business days for your refill to be completed.          Additional Information About Your Visit        [employer] Information     [employer] gives you secure access to your electronic health record. If you see a primary care provider, you can also send messages to your care team and make appointments. If you have questions, please call your primary care clinic.  If you do not have a primary care provider, please call [phone] and they will assist you.        Care EveryWhere ID     This is your Care EveryWhere ID. This could be used by other organizations to access your [place] medical records  [id number]        Your Vitals Were     Pulse Height Pulse Oximetry BMI (Body Mass Index)          65 1.778 m (5' 10\") 100% 27.26 kg/m2         Blood Pressure from Last 3 Encounters:   09/26/18 150/77   09/25/18 141/78   09/10/18 130/76    Weight from Last 3 Encounters:   09/26/18 86.2 kg (190 lb)   09/25/18 86.2 kg (190 lb)   09/10/18 85.7 kg (189 lb)              We Performed the Following     Laryngoscopy, Fiber          Today's Medication Changes          These changes are accurate as of 9/26/18  3:05 PM.  If you have any questions, ask your nurse or doctor.               These medicines have changed or have updated prescriptions.        Dose/Directions    omeprazole 20 MG CR capsule   Commonly known as:  priLOSEC   This may " have changed:  when to take this   Used for:  Gastroesophageal reflux disease without esophagitis        Dose:  20 mg   Take 1 capsule (20 mg) by mouth 2 times daily   Quantity:  180 capsule   Refills:  3                Primary Care Provider Office Phone # Fax #    Frannie Talavera [phone] [address] [address]        Equal Access to Services     [facility]: Hadii aad ku hadasho Soomaali, waaxda luqadaha, qaybta kaalmada adeegyada, waxay idiin hayaan adeeg khthierno laGloriakristinen ah. So [facility] [phone].    ATENCIÓN: Si habla español, tiene a del toro disposición servicios gratuitos de asistencia lingüística. [facility] [phone].    We comply with applicable federal civil rights laws and [place] laws. We do not discriminate on the basis of race, color, national origin, age, disability, sex, sexual orientation, or gender identity.            Thank you!     Thank you for choosing [facility]  for your care. Our goal is always to provide you with excellent care. Hearing back from our patients is one way we can continue to improve our services. Please take a few minutes to complete the written survey that you may receive in the mail after your visit with us. Thank you!             Your Updated Medication List - Protect others around you: Learn how to safely use, store and throw away your medicines at www.disposemymeds.org.          This list is accurate as of 9/26/18  3:05 PM.  Always use your most recent med list.                   Brand Name Dispense Instructions for use Diagnosis    * aspirin 81 MG tablet      Take 1 tablet by mouth At Bedtime        * ASPIRIN PO      Take 325 mg by mouth daily 2 tablets daily for arthritis        Calcium-Magnesium-Zinc-D3 Tabs      Take 1 tablet by mouth 2 times daily        EPINEPHrine 0.3 MG/0.3ML injection    EPIPEN 2-NETO    2 each    Inject 0.3 mLs into the muscle once as needed for anaphylaxis for 1 dose.    Exposure        omeprazole 20 MG CR capsule    priLOSEC    180 capsule    Take 1 capsule (20 mg) by mouth 2 times daily    Gastroesophageal reflux disease without esophagitis       order for DME     1 Units    CPAP 9 cm H20    ADOLFO (obstructive sleep apnea)       RESVERATROL PO      Take by mouth daily        VITAMIN C CR PO      Take  by mouth.        * Notice:  This list has 2 medication(s) that are the same as other medications prescribed for you. Read the directions carefully, and ask your doctor or other care provider to review them with you.

## 2018-09-26 NOTE — PATIENT INSTRUCTIONS
General Scheduling Information  To schedule your CT/MRI scan, please contact [place] at [phone]   [address] [place] [address]    To schedule your Surgery, please contact our Specialty Schedulers at [phone]    ENT Clinic Locations Clinic Hours Telephone Number     [facility] [place]  [address] [place] [address]   Tuesday:       8:00am -- 4:00pm    Wednesday:  8:00am - 4:00pm   To schedule an appointment with   Dr. Perry,   please contact our   Specialty Scheduling Department at:     [phone]       [facility]  [address] [place] [address]   Friday:          8:00am - 4:00pm         Urgent Care Locations Clinic Hours Telephone Numbers     [facility]  [address] [place] [address]     Monday-Friday:     11:00pm - 9:00pm    Saturday-Sunday:  9:00am - 5:00pm   [phone]     [facility]  [address] [place] [address]     Monday-Friday:      5:00pm - 9:00pm     Saturday-Sunday:  9:00am - 5:00pm   [phone]

## 2018-09-26 NOTE — PROGRESS NOTES
I am seeing this patient in consultation for lung nodules, rule-out head and neck cancer at the request of the provider Dr. James Garcia.       Chief Complaint - head and neck cancer evaluation    History of Present Illness - Garrett Del Rio is a 72 year old male who presents with a history of lung nodules on CT chest. He was referred to r/o head and neck malignancy. No throat pain, dysphagia, odynaphagia. Voicing has seemed normal. No cough. No hemoptysis. The patient denies any recent intubations or throat procedures. Had EGD for reflux and that was okay. Former smoker (quit 25 years ago). Has had sinusitis in the past. No symptoms now.    Past Medical History -   Patient Active Problem List   Diagnosis     GERD (gastroesophageal reflux disease)     Esophageal spasms     CARDIOVASCULAR SCREENING; LDL GOAL LESS THAN 160     Hypertension goal BP (blood pressure) < 140/90     OA (OSTEOARTHRITIS) OF KNEE - left     Prostate cancer screening     ADOLFO (obstructive sleep apnea)- moderate (AHI 21)     Mantoux: positive     Advanced directives, counseling/discussion     Hiatal hernia     Colon polyp     Diverticulosis of large intestine     Psychophysiologic insomnia     Nonalcoholic fatty liver disease     Lower abdominal pain       Current Medications -   Current Outpatient Prescriptions:      Ascorbic Acid (VITAMIN C CR PO), Take  by mouth., Disp: , Rfl:      aspirin 81 MG tablet, Take 1 tablet by mouth At Bedtime , Disp: , Rfl:      ASPIRIN PO, Take 325 mg by mouth daily 2 tablets daily for arthritis, Disp: , Rfl:      EPINEPHrine (EPIPEN 2-NETO) 0.3 MG/0.3ML injection, Inject 0.3 mLs into the muscle once as needed for anaphylaxis for 1 dose., Disp: 2 each, Rfl: 3     Multiple Minerals-Vitamins (CALCIUM-MAGNESIUM-ZINC-D3) TABS, Take 1 tablet by mouth 2 times daily, Disp: , Rfl:      omeprazole (PRILOSEC) 20 MG CR capsule, Take 1 capsule (20 mg) by mouth 2 times daily (Patient taking differently: Take 20 mg by mouth  daily ), Disp: 180 capsule, Rfl: 3     order for DME, CPAP 9 cm H20, Disp: 1 Units, Rfl: 0     RESVERATROL PO, Take by mouth daily, Disp: , Rfl:     Allergies -   Allergies   Allergen Reactions     Septra [Sulfamethoxazole W-Trimethoprim]        Social History -   Social History     Social History     Marital status:      Spouse name: N/A     Number of children: N/A     Years of education: N/A     Occupational History     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 No     Social History Narrative       Family History -   Family History   Problem Relation Age of Onset     Prostate Cancer Father      C.A.D. Father      Diabetes Paternal Grandfather      Hypertension No family hx of      Cancer No family hx of        Review of Systems - As per HPI and PMHx, otherwise 10+ comprehensive system review is negative.    Physical Exam  /77  General - The patient is in no distress. Alert and oriented to person and place, answers questions and cooperates with examination appropriately.   Voice and Breathing - The patient was breathing comfortably without the use of accessory muscles. There was no wheezing, stridor, or stertor.  The patients voice was clear and strong.  Ears - tympanic membranes intact, no effusions, no masses. Normal canals.   Eyes - Extraocular movements intact.  Sclera were not icteric or injected, conjunctiva were pink and moist.  Mouth - Examination of the oral cavity showed pink, healthy oral  mucosa. No lesions or ulcerations noted.  The tongue was mobile and midline.  Throat - The walls of the oropharynx were smooth, symmetric, and had no lesions or ulcerations.  The tonsillar pillars and soft palate were symmetric.  The uvula was midline on elevation. Tonsils 0+. No postnasal drainage.  Nose - External contour is symmetric, no gross deflection or scars.  Nasal mucosa is pink and moist with no abnormal mucus.  The septum was midline and non-obstructive, turbinates of normal size and position.  No polyps, masses, or purulence noted on examination.  Neck -  Palpation of the occipital, submental, submandibular, internal jugular chain, and supraclavicular nodes did not demonstrate any abnormal lymph nodes or masses. No parotid masses. Palpation of the thyroid was soft and smooth, with no nodules or goiter appreciated.  The trachea was mobile and midline.  Neurologic - CN II-XII are grossly intact, no focal neurologic deficits.   Cardiovascular - carotid pulses are 2+ bilaterally, regular rhythm    Procedure: Flexible Endoscopy  Indication: lung nodules, rule-out head and neck cancer    To best visualize the upper airway anatomy and due to the chief complaint and HPI, I proceeded with a fiberoptic examination. Color photographs were taken for the medical record. First I sprayed both sides of the nose with a mixture of lidocaine and neosynephrine.  I then passed the scope through the right nasal cavity.  The nasal cavity was unremarkable. No masses or lesions. The nasopharynx was mucosally covered and symmetric.  The Eustachian tube openings were unobstructed.  Going further down I had a clear view of the base of tongue which had normal appearing lingual tonsillar tissue.  The base of tongue was free of lesions, masses, and the vallecula was open.  The epiglottis was smooth and mucosally covered.  The supraglottic larynx was then clearly visualized. It was normal. No masses or erythema. No lesions. The vocal  cords moved smoothly and symmetrically, mild erythema of the left cord, possibly due to reflux. The pyriform sinuses were open, and the limited view of the postcricoid region did not show any lesions. I then entered the left nasal cavity. It was normal. No masses, pus, lesions.       A/P - Garrett Del Rio is a 72 year old male with new pulmonary lung nodules. He was referred to verify there is no evidence of a head and neck cancer primary. His exam showed no evidence of tumor. He has mild left vocal cord erythema, no masses. The erythema maybe laryngopharyngeal reflux. Per his reports his EGD was okay. He takes prilosec and it seems to control symptoms. I worry if he has metastatic disease it is from a non head and neck primary.         Dr. Kashif Perry  Otolaryngology  [facility]

## 2018-10-01 ENCOUNTER — OFFICE VISIT (OUTPATIENT)
Dept: PULMONOLOGY | Facility: CLINIC | Age: 73
End: 2018-10-01
Payer: COMMERCIAL

## 2018-10-01 VITALS
SYSTOLIC BLOOD PRESSURE: 144 MMHG | WEIGHT: 192.5 LBS | OXYGEN SATURATION: 98 % | RESPIRATION RATE: 16 BRPM | TEMPERATURE: 97 F | HEIGHT: 70 IN | BODY MASS INDEX: 27.56 KG/M2 | HEART RATE: 80 BPM | DIASTOLIC BLOOD PRESSURE: 75 MMHG

## 2018-10-01 DIAGNOSIS — R91.8 PULMONARY NODULES: Primary | ICD-10-CM

## 2018-10-01 DIAGNOSIS — R91.1 LUNG NODULE: Primary | ICD-10-CM

## 2018-10-01 PROCEDURE — 99204 OFFICE O/P NEW MOD 45 MIN: CPT | Performed by: INTERNAL MEDICINE

## 2018-10-01 ASSESSMENT — PAIN SCALES - GENERAL: PAINLEVEL: NO PAIN (0)

## 2018-10-01 NOTE — NURSING NOTE
"Garrett Del Rio's goals for this visit include: Consult  He requests these members of his care team be copied on today's visit information: PCP    PCP: Frannie Talavera    Referring Provider:  Frannie Talavera MD  [address] [place] [address]    /75  Pulse 80  Temp 97  F (36.1  C)  Resp 16  Ht 1.778 m (5' 10\")  Wt 87.3 kg (192 lb 8 oz)  SpO2 98%  BMI 27.62 kg/m2    Do you need any medication refills at today's visit? N    "

## 2018-10-01 NOTE — PROGRESS NOTES
LUNG NODULE & INTERVENTIONAL PULMONARY CLINIC  [facility], [facility]     Garrett Del Rio MRN# [id number]   Age: 72 year old YOB: 1945     Reason for Consultation: lung nodule(s)    Requesting Physician: Frannie Talavera MD  [address] [place] [address]       Assessment and Plan:    1. New multiple pulmonary lung nodule(s). Given the characteristics on current/previous imaging and risk factors; I would classify this to be Intermediate (6-65%) risk for cancer. New since CT 3/2017. No Si/Sx of underlying malignancy. DDx includes infection (atypical I.e. STEPHANI vs actino/nocardia) vs inflammatory (i.e. Sarcoid) vs malignancy (e.e metastatic). Discussed role for biopsy of nodule and LN, he is agreeable. Will plan for BAL, endobronchial biopsies, EBUS-TBNA of mediastinal node and rEBUS-ENB however I think we can reach one of the RLL nodules with linear EBUS (see picture below). Plan procedure in OR next wk.     RLL nodule likely accessible by linear EBUS.       Billing: The patient was seen and examined by me and the findings, assessment, and plan as documented was explained to the patient/family who expressed understand.     Jorge Lopez MD   of Medicine  Interventional Pulmonology  Department of Pulmonary, Allergy, Critical Care and Sleep Medicine   [facility]  Pager: [phone]          History:     Garrett Del Rio is a 72 year old male with sig h/o for kidney stones who is here for evaluation/followup of lung nodule(s).    - No new resp sx or complaints. Denies dyspnea or cough.   - Had CT a/p for pain and found to have incidental pulm nodules. Previous CT 3/2017 without any evidence of nodules.  - Personal hx of cancer: No. Up-to-date on c-scope. No previous skin cancers.  - Family hx of cancer: no lung cancer  - Exposure hx: Denies asbestos or radon exposure   - Tobacco hx: Past  Smoker: 0.5ppd for 30years. Quit 35yrs ago.   - My interpretation of the images relevant for this visit includes: new bilateral pulm nodules with mediastinal LAD   - My interpretation of the PFT's relevant for this visit includes: None     Culprit Nodule(s):   Multiple bilateral lung nodule(s) that are sub 8 mm. First seen by chest CT on 9/18/18. First observed on this date     Other active medical problems include:   - None          Past Medical History:      Past Medical History:   Diagnosis Date     Kidney stone 11/10/2011    Diagnosed by ultrasound 10/24/11 3-4 mm      MEDIAL MENISCUS TEAR - left 10/24/2011           Past Surgical History:      Past Surgical History:   Procedure Laterality Date     CARDIAC STRESS TST,COMPLETE  2006     HC KNEE SCOPE,MED/LAT MENISECTOMY  11/11/11    left, with partial medial menisectomy ONLY          Social History:     Social History   Substance Use Topics     Smoking status: Former Smoker     Packs/day: 1.00     Years: 35.00     Types: Cigarettes, Cigars, Pipe     Start date: 6/6/1963     Quit date: 10/13/1998     Smokeless tobacco: Never Used     Alcohol use Yes      Comment: about a beer a week           Family History:     Family History   Problem Relation Age of Onset     Prostate Cancer Father      C.A.D. Father      Diabetes Paternal Grandfather      Hypertension No family hx of      Cancer No family hx of            Allergies:      Allergies   Allergen Reactions     Septra [Sulfamethoxazole W-Trimethoprim]           Medications:     Current Outpatient Prescriptions   Medication Sig     Ascorbic Acid (VITAMIN C CR PO) Take  by mouth.     aspirin 81 MG tablet Take 1 tablet by mouth At Bedtime      ASPIRIN PO Take 325 mg by mouth daily 2 tablets daily for arthritis     EPINEPHrine (EPIPEN 2-NETO) 0.3 MG/0.3ML injection Inject 0.3 mLs into the muscle once as needed for anaphylaxis for 1 dose.     Multiple Minerals-Vitamins (CALCIUM-MAGNESIUM-ZINC-D3) TABS Take 1 tablet by  mouth 2 times daily     omeprazole (PRILOSEC) 20 MG CR capsule Take 1 capsule (20 mg) by mouth 2 times daily (Patient taking differently: Take 20 mg by mouth daily )     order for DME CPAP 9 cm H20     RESVERATROL PO Take by mouth daily     No current facility-administered medications for this visit.           Review of Systems:     CONSTITUTIONAL: negative for fever, chills, change in weight  INTEGUMENTARY/SKIN: no rash or obvious new lesions  ENT/MOUTH: no sore throat, new sinus pain or nasal drainage  RESP: see interval history  CV: negative for chest pain, palpitations or peripheral edema  GI: no nausea, vomiting, change in stools  : no dysuria  MUSCULOSKELETAL: no myalgias, arthralgias  ENDOCRINE: blood sugars with adequate control  PSYCHIATRIC: mood stable  LYMPHATIC: no new lymphadenopathy  HEME: no bleeding or easy bruisability  NEURO: no numbness, weakness, headaches         Physical Exam:     Temp:  [97  F (36.1  C)] 97  F (36.1  C)  Pulse:  [80] 80  Resp:  [16] 16  BP: (144)/(75) 144/75  SpO2:  [98 %] 98 %  Wt Readings from Last 4 Encounters:   10/01/18 87.3 kg (192 lb 8 oz)   09/26/18 86.2 kg (190 lb)   09/25/18 86.2 kg (190 lb)   09/10/18 85.7 kg (189 lb)     Constitutional:   Awake, alert and in no apparent distress     Eyes:   Nonicteric, ESTRELLA     ENT:    Trachea is midline. No gross neck abnormalities      Neck:   Supple without supraclavicular or cervical lymphadenopathy     Lungs:   Good air flow.  No crackles. No rhonchi.  No wheezes.     Cardiovascular:   Normal S1 and S2.  RRR.  No murmur, gallop or rub.  Radial, DP and PT pulses normal and symmetric     Abdomen:   NABS, soft, nontender, nondistended.  No HSM.     Musculoskeletal:   No edema.      Neurologic:   Alert and conversant. Cranial nerves  intact.       Skin:   Warm, dry.  No rash on limited exam.           Current Laboratory Data:   All laboratory and imaging data reviewed.             Previous Cardiology Imaging   No results found  for this or any previous visit (from the past 8760 hour(s)).

## 2018-10-02 ENCOUNTER — TELEPHONE (OUTPATIENT)
Dept: NURSING | Facility: CLINIC | Age: 73
End: 2018-10-02

## 2018-10-02 NOTE — TELEPHONE ENCOUNTER
Patient's wife called. Dr Lopez had told them the biopsy would be done on Wed, Thurs, or Fri. They prefer Fri Oct 12 th to arrive after 10:00 am. Home phone number is best. Sent information to Johana DUONG at the [facility].  TIM Ayala

## 2018-10-03 ENCOUNTER — TELEPHONE (OUTPATIENT)
Dept: PULMONOLOGY | Facility: CLINIC | Age: 73
End: 2018-10-03

## 2018-10-03 NOTE — TELEPHONE ENCOUNTER
Spoke with patient to schedule procedure with Dr. Jroge Lopez   Procedure was scheduled on 10/18 at [facility] OR  Patient will have H&P with: Dr. Lopez's last encounter  Patient is aware a / is needed day of surgery.   Surgery letter was sent via [employer], patient has my direct contact information for any further questions.

## 2018-10-17 ENCOUNTER — ANESTHESIA EVENT (OUTPATIENT)
Dept: SURGERY | Facility: CLINIC | Age: 73
End: 2018-10-17
Payer: MEDICARE

## 2018-10-17 ASSESSMENT — LIFESTYLE VARIABLES: TOBACCO_USE: 1

## 2018-10-17 NOTE — ANESTHESIA PREPROCEDURE EVALUATION
Anesthesia Evaluation     .             ROS/MED HX    ENT/Pulmonary: Comment: B/L lung nodules    (+)sleep apnea, tobacco use, Past use , . .    Neurologic:  - neg neurologic ROS     Cardiovascular:     (+) hypertension----. : . . . :. .       METS/Exercise Tolerance:     Hematologic:  - neg hematologic  ROS       Musculoskeletal:  - neg musculoskeletal ROS       GI/Hepatic: Comment: MAGAÑA    (+) GERD hiatal hernia,       Renal/Genitourinary:  - ROS Renal section negative       Endo:  - neg endo ROS       Psychiatric:  - neg psychiatric ROS       Infectious Disease:  - neg infectious disease ROS       Malignancy:         Other:                     Physical Exam  Normal systems: cardiovascular and pulmonary    Airway   Mallampati: II  TM distance: >3 FB  Neck ROM: full    Dental     Cardiovascular   Rhythm and rate: regular and normal      Pulmonary    breath sounds clear to auscultation(-) no wheezes and no rales                    Anesthesia Plan      History & Physical Review      ASA Status:  2 .    NPO Status:  > 8 hours    Plan for General and ETT with Intravenous and Propofol induction. Maintenance will be TIVA.    PONV prophylaxis:  Ondansetron (or other 5HT-3) and Dexamethasone or Solumedrol       Postoperative Care  Postoperative pain management:  IV analgesics, Oral pain medications and Multi-modal analgesia.      Consents  Anesthetic plan, risks, benefits and alternatives discussed with:  Patient..        72yM. Presents for EBUS and Bx of lung nodule. Hx HTN, ADOLFO, MAGAÑA, hiatal hernia and GERD. Stress test 10/2015 WNL. GETA w/ TIVA titrated by BIS.  ___________________   Bal Parada MD          Pager: [phone]                    .

## 2018-10-18 ENCOUNTER — HOSPITAL ENCOUNTER (OUTPATIENT)
Facility: CLINIC | Age: 73
Discharge: HOME | End: 2018-10-18
Attending: INTERNAL MEDICINE | Admitting: INTERNAL MEDICINE
Payer: MEDICARE

## 2018-10-18 ENCOUNTER — HOSPITAL ENCOUNTER (OUTPATIENT)
Dept: CT IMAGING | Facility: CLINIC | Age: 73
End: 2018-10-18
Attending: CLINICAL NURSE SPECIALIST
Payer: MEDICARE

## 2018-10-18 ENCOUNTER — ANESTHESIA (OUTPATIENT)
Dept: SURGERY | Facility: CLINIC | Age: 73
End: 2018-10-18
Payer: MEDICARE

## 2018-10-18 ENCOUNTER — SURGERY (OUTPATIENT)
Age: 73
End: 2018-10-18

## 2018-10-18 VITALS
HEIGHT: 70 IN | RESPIRATION RATE: 16 BRPM | TEMPERATURE: 98.1 F | SYSTOLIC BLOOD PRESSURE: 162 MMHG | DIASTOLIC BLOOD PRESSURE: 84 MMHG | BODY MASS INDEX: 26.99 KG/M2 | OXYGEN SATURATION: 98 % | HEART RATE: 85 BPM | WEIGHT: 188.49 LBS

## 2018-10-18 DIAGNOSIS — R91.1 LUNG NODULE: ICD-10-CM

## 2018-10-18 LAB — GLUCOSE BLDC GLUCOMTR-MCNC: 103 MG/DL (ref 70–99)

## 2018-10-18 PROCEDURE — 82962 GLUCOSE BLOOD TEST: CPT

## 2018-10-18 PROCEDURE — 40000883 ZZH CANCELLED SURGERY UP TO 61-90 MINS: Performed by: INTERNAL MEDICINE

## 2018-10-18 PROCEDURE — 25000132 ZZH RX MED GY IP 250 OP 250 PS 637: Mod: GY | Performed by: STUDENT IN AN ORGANIZED HEALTH CARE EDUCATION/TRAINING PROGRAM

## 2018-10-18 PROCEDURE — 71250 CT THORAX DX C-: CPT

## 2018-10-18 PROCEDURE — 93010 ELECTROCARDIOGRAM REPORT: CPT | Performed by: INTERNAL MEDICINE

## 2018-10-18 PROCEDURE — 40000065 ZZH STATISTIC EKG NON-CHARGEABLE

## 2018-10-18 PROCEDURE — A9270 NON-COVERED ITEM OR SERVICE: HCPCS | Mod: GY | Performed by: STUDENT IN AN ORGANIZED HEALTH CARE EDUCATION/TRAINING PROGRAM

## 2018-10-18 RX ORDER — ONDANSETRON 4 MG/1
4 TABLET, ORALLY DISINTEGRATING ORAL EVERY 30 MIN PRN
Status: CANCELLED | OUTPATIENT
Start: 2018-10-18

## 2018-10-18 RX ORDER — FENTANYL CITRATE 50 UG/ML
25-50 INJECTION, SOLUTION INTRAMUSCULAR; INTRAVENOUS
Status: CANCELLED | OUTPATIENT
Start: 2018-10-18

## 2018-10-18 RX ORDER — LABETALOL HYDROCHLORIDE 5 MG/ML
5 INJECTION, SOLUTION INTRAVENOUS EVERY 10 MIN PRN
Status: CANCELLED | OUTPATIENT
Start: 2018-10-18

## 2018-10-18 RX ORDER — HYDRALAZINE HYDROCHLORIDE 20 MG/ML
2.5-5 INJECTION INTRAMUSCULAR; INTRAVENOUS EVERY 10 MIN PRN
Status: CANCELLED | OUTPATIENT
Start: 2018-10-18

## 2018-10-18 RX ORDER — ONDANSETRON 2 MG/ML
4 INJECTION INTRAMUSCULAR; INTRAVENOUS EVERY 30 MIN PRN
Status: CANCELLED | OUTPATIENT
Start: 2018-10-18

## 2018-10-18 RX ORDER — LIDOCAINE 40 MG/G
CREAM TOPICAL
Status: DISCONTINUED | OUTPATIENT
Start: 2018-10-18 | End: 2018-10-18 | Stop reason: HOSPADM

## 2018-10-18 RX ORDER — NALOXONE HYDROCHLORIDE 0.4 MG/ML
.1-.4 INJECTION, SOLUTION INTRAMUSCULAR; INTRAVENOUS; SUBCUTANEOUS
Status: CANCELLED | OUTPATIENT
Start: 2018-10-18 | End: 2018-10-19

## 2018-10-18 RX ORDER — SODIUM CHLORIDE, SODIUM LACTATE, POTASSIUM CHLORIDE, CALCIUM CHLORIDE 600; 310; 30; 20 MG/100ML; MG/100ML; MG/100ML; MG/100ML
INJECTION, SOLUTION INTRAVENOUS CONTINUOUS
Status: DISCONTINUED | OUTPATIENT
Start: 2018-10-18 | End: 2018-10-18 | Stop reason: HOSPADM

## 2018-10-18 RX ORDER — ACETAMINOPHEN 325 MG/1
650 TABLET ORAL ONCE
Status: COMPLETED | OUTPATIENT
Start: 2018-10-18 | End: 2018-10-18

## 2018-10-18 RX ORDER — HYDROMORPHONE HYDROCHLORIDE 1 MG/ML
.3-.5 INJECTION, SOLUTION INTRAMUSCULAR; INTRAVENOUS; SUBCUTANEOUS EVERY 10 MIN PRN
Status: CANCELLED | OUTPATIENT
Start: 2018-10-18

## 2018-10-18 RX ORDER — FENTANYL CITRATE 50 UG/ML
25-50 INJECTION, SOLUTION INTRAMUSCULAR; INTRAVENOUS EVERY 5 MIN PRN
Status: CANCELLED | OUTPATIENT
Start: 2018-10-18

## 2018-10-18 RX ADMIN — ACETAMINOPHEN 650 MG: 325 TABLET, FILM COATED ORAL at 10:34

## 2018-10-18 NOTE — OR NURSING
Via OR RN, Dr. Lopez coordinates VERAN rep to come; rep was due to arrive to check in at OR desk at 0900 but has not yet checked in.

## 2018-10-18 NOTE — OR NURSING
Pt went to CT scan prior to arriving to 3C PreOp. Pt did not yet have CT; was told to go to PreOp first.  CT dept contacted and said [facility] rep needs to be present for CT. OR desk said Dr. Lopez coordinates getting LUIS FELIPE rodriguez.  Dr. Lopez is in OR; attempting to find out when rep will arrive to plan pt going back to CT. Will proceed with usual PreOp admission at this time.

## 2018-10-19 DIAGNOSIS — R91.8 PULMONARY NODULES: Primary | ICD-10-CM

## 2018-10-19 LAB — INTERPRETATION ECG - MUSE: NORMAL

## 2018-10-19 NOTE — PROGRESS NOTES
Orders placed for chest CT per Dr Lopez's note below. Added to wait list per Rebecca.  DBalcome, RN      Can we set him up to see me in clinic in 3mo with ct chest same day. Ok to overbook. Thanks rc

## 2018-10-26 ENCOUNTER — DOCUMENTATION ONLY (OUTPATIENT)
Dept: LAB | Facility: CLINIC | Age: 73
End: 2018-10-26

## 2018-10-26 ENCOUNTER — TELEPHONE (OUTPATIENT)
Dept: FAMILY MEDICINE | Facility: CLINIC | Age: 73
End: 2018-10-26

## 2018-10-26 DIAGNOSIS — Z13.6 CARDIOVASCULAR SCREENING; LDL GOAL LESS THAN 160: Primary | ICD-10-CM

## 2018-10-26 DIAGNOSIS — R10.13 ABDOMINAL PAIN, EPIGASTRIC: ICD-10-CM

## 2018-10-26 DIAGNOSIS — Z12.5 SCREENING FOR PROSTATE CANCER: ICD-10-CM

## 2018-10-26 DIAGNOSIS — I10 ESSENTIAL HYPERTENSION WITH GOAL BLOOD PRESSURE LESS THAN 140/90: ICD-10-CM

## 2018-10-26 DIAGNOSIS — K21.9 GERD (GASTROESOPHAGEAL REFLUX DISEASE): ICD-10-CM

## 2018-10-26 DIAGNOSIS — N40.0 ENLARGED PROSTATE: ICD-10-CM

## 2018-10-26 NOTE — TELEPHONE ENCOUNTER
Reason for Call:  Other     Detailed comments: Patient called stating that he had asked via [employer] to address the overdue bloodwork that was ordered. He states he was scheduled for an annual physical with a provider that he does not know when all he wanted to do was schedule a lab appointment. He feels there is no need for an annual physical as he has seen about 5 doctors lately and does not feel that an annual physical is necessary. TC cancelled appointment on 11/05 with Dr Andrade.     Phone Number Patient can be reached at: Home number on file [phone] (home)    Best Time: any    Can we leave a detailed message on this number? YES    Call taken on 10/26/2018 at 10:11 AM by Emily Alfonso

## 2018-10-26 NOTE — TELEPHONE ENCOUNTER
Route to PCP.  Please see my note below.  Is there anything you would like to add to upcoming health maintenance labs, and is there a time between 9am-1pm that we could double-book you to see patient for follow-up?    Chart reviewed, and RN sees no ordered labs or upcoming appointments. Per health maintenance, he is due for lipids and BMP.  Last correspondence with this clinic was 9/19/18 with recommendation to f/u with PCP to discuss lung nodules, then again after he was seen by lung nodule clinic.  He has seen pulmonology now for nodules.  RN spoke with patient, who states he saw no reason to see a doctor he's never seen for a physical, as he doesn't know his history.  He prefers to see Dr. 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 There are no appointments in November or December for this time.  RN assisted with scheduling fasting labs for next week and will route to PCP to see if there is anything additional she would like to order or if there is a time we can double-book patient for follow-up.    Lizz Hill RN

## 2018-10-26 NOTE — TELEPHONE ENCOUNTER
We certainly can have him schedule a lab only appointment for lab work.    It has been some time since we have done a full preventive health visit (Physical). But this is also not urgent.  I certainly am happy to schedule him for a 12:20 appointment when that is next available.  If he prefers to see me sooner than next available, please let me know and we will find a solution.

## 2018-10-29 ENCOUNTER — TELEPHONE (OUTPATIENT)
Dept: FAMILY MEDICINE | Facility: CLINIC | Age: 73
End: 2018-10-29

## 2018-10-29 DIAGNOSIS — I10 ESSENTIAL HYPERTENSION WITH GOAL BLOOD PRESSURE LESS THAN 140/90: ICD-10-CM

## 2018-10-29 DIAGNOSIS — Z13.6 CARDIOVASCULAR SCREENING; LDL GOAL LESS THAN 160: ICD-10-CM

## 2018-10-29 DIAGNOSIS — R10.13 ABDOMINAL PAIN, EPIGASTRIC: ICD-10-CM

## 2018-10-29 DIAGNOSIS — Z12.5 SCREENING FOR PROSTATE CANCER: ICD-10-CM

## 2018-10-29 DIAGNOSIS — K21.9 GERD (GASTROESOPHAGEAL REFLUX DISEASE): ICD-10-CM

## 2018-10-29 DIAGNOSIS — N40.0 ENLARGED PROSTATE: ICD-10-CM

## 2018-10-29 DIAGNOSIS — R73.09 ELEVATED GLUCOSE: Primary | ICD-10-CM

## 2018-10-29 LAB
ANION GAP SERPL CALCULATED.3IONS-SCNC: 9 MMOL/L (ref 3–14)
BUN SERPL-MCNC: 10 MG/DL (ref 7–30)
CALCIUM SERPL-MCNC: 9.6 MG/DL (ref 8.5–10.1)
CHLORIDE SERPL-SCNC: 104 MMOL/L (ref 94–109)
CHOLEST SERPL-MCNC: 138 MG/DL
CO2 SERPL-SCNC: 25 MMOL/L (ref 20–32)
CREAT SERPL-MCNC: 0.97 MG/DL (ref 0.66–1.25)
GFR SERPL CREATININE-BSD FRML MDRD: 76 ML/MIN/1.7M2
GLUCOSE SERPL-MCNC: 119 MG/DL (ref 70–99)
HDLC SERPL-MCNC: 25 MG/DL
LDLC SERPL CALC-MCNC: 56 MG/DL
NONHDLC SERPL-MCNC: 113 MG/DL
POTASSIUM SERPL-SCNC: 4.3 MMOL/L (ref 3.4–5.3)
PSA SERPL-ACNC: 0.96 UG/L (ref 0–4)
SODIUM SERPL-SCNC: 138 MMOL/L (ref 133–144)
TRIGL SERPL-MCNC: 284 MG/DL

## 2018-10-29 PROCEDURE — 80048 BASIC METABOLIC PNL TOTAL CA: CPT | Performed by: FAMILY MEDICINE

## 2018-10-29 PROCEDURE — 36415 COLL VENOUS BLD VENIPUNCTURE: CPT | Performed by: FAMILY MEDICINE

## 2018-10-29 PROCEDURE — 80061 LIPID PANEL: CPT | Performed by: FAMILY MEDICINE

## 2018-10-29 PROCEDURE — G0103 PSA SCREENING: HCPCS | Performed by: FAMILY MEDICINE

## 2018-10-29 NOTE — TELEPHONE ENCOUNTER
"Sent patient this lab message    Cholesterol looks great!  Kidneys and potassium are normal.  PSA is stable.  But your blood sugar is elevated - I would suggest that we follow this test with an A1C (which gives us an average of the past three months of blood sugars).  Please schedule a \"lab only\" appointment at your convenience.    Please watch to see if he schedules a lab appointment.  If no lab appointment scheduled by end of next week, please reach out to him by phone.  "

## 2018-11-02 NOTE — TELEPHONE ENCOUNTER
Called patient and left a VM message and relayed Dr Talavera message below.    Asked patient to call me if he wants to schedule a physical w/ Dr Talavera.    Iliana Luong

## 2018-11-05 ENCOUNTER — TELEPHONE (OUTPATIENT)
Dept: FAMILY MEDICINE | Facility: CLINIC | Age: 73
End: 2018-11-05

## 2018-11-05 DIAGNOSIS — R53.83 FATIGUE, UNSPECIFIED TYPE: Primary | ICD-10-CM

## 2018-11-05 NOTE — TELEPHONE ENCOUNTER
I have ordered the Lyme test as requested.  And I would like Garrett to come see me.  (Can use a same day spot if needed to prevent a long wait to get in)    I have only seen him 2 times in the past 12 months.  I know that his wife has been concerned about his fatigue - Although I am not certain I understand if his level of fatigue has changed - it has been a long standing concern which we have been trying to pursue through sleep medicine, because he doesn't sleep well with his CPAP machine?    And when I have seen him in the past 12 months - other issues have come up that have held more importance and urgency than his fatigue - so I don't feel that we have had a full chance to truly address just the fatigue.

## 2018-11-05 NOTE — TELEPHONE ENCOUNTER
Patient/family was instructed to return call to [facility] clinic RN directly on the RN Call back line at [phone].   Karma Rdz,Clinic Rn  [facility]

## 2018-11-05 NOTE — TELEPHONE ENCOUNTER
"Route to PCP to advise please.  Would you like to order a Lyme test? Patient's spouse had sent a [employer] message via her chart, which is pasted below:    \"Dear Dr. Talavera,    My question is really about my , Garrett.(picked the best option from choices available) Just wondering if he should be tested for lyme disease. He has had severe fatigue for so long. Several family members have been diagnosed with lyme including myself and have been treated. Garrett has had many tests and everything comes back normal. There has to be a reason why he is always so tired.  He is having lab work for his A1C this Wednesday. If you think a lyme check is warranted, you could put orders in for it that same day.    Appreciate your input,    Cristina Del Rio\"      Lizz Hill RN    "

## 2018-11-05 NOTE — TELEPHONE ENCOUNTER
Patient called back and he does not want to do a physical until this coming summer when he is not working.  Patient feels like he has seen enough specialist in the last couple of months that if something was significantly wrong with him they would have caught it.    Iliana Luong

## 2018-11-06 NOTE — TELEPHONE ENCOUNTER
Patient returns call to clinic.  RN relayed recommendation from provider below. Spoke with TC and scheduled patient to see PCP tomorrow at 12:20, as patient's work schedule only allows for visits 9-1, and only same-day appts are 2pm for the next 2 months.  He has labs at 9am tomorrow also, and RN offered to switch this so back-to-back with PCP appt, but he wanted to keep the 9am lab.      Lizz Hill RN

## 2018-11-07 ENCOUNTER — OFFICE VISIT (OUTPATIENT)
Dept: FAMILY MEDICINE | Facility: CLINIC | Age: 73
End: 2018-11-07
Payer: COMMERCIAL

## 2018-11-07 VITALS
HEART RATE: 84 BPM | TEMPERATURE: 97.9 F | DIASTOLIC BLOOD PRESSURE: 70 MMHG | BODY MASS INDEX: 27.2 KG/M2 | HEIGHT: 70 IN | SYSTOLIC BLOOD PRESSURE: 137 MMHG | WEIGHT: 190 LBS

## 2018-11-07 DIAGNOSIS — K21.9 GASTROESOPHAGEAL REFLUX DISEASE WITHOUT ESOPHAGITIS: ICD-10-CM

## 2018-11-07 DIAGNOSIS — R53.83 FATIGUE, UNSPECIFIED TYPE: Primary | ICD-10-CM

## 2018-11-07 DIAGNOSIS — R73.09 ELEVATED GLUCOSE: ICD-10-CM

## 2018-11-07 DIAGNOSIS — G47.33 OSA (OBSTRUCTIVE SLEEP APNEA): Chronic | ICD-10-CM

## 2018-11-07 DIAGNOSIS — R53.83 FATIGUE, UNSPECIFIED TYPE: ICD-10-CM

## 2018-11-07 LAB
B BURGDOR IGG+IGM SER QL: 0.04 (ref 0–0.89)
HBA1C MFR BLD: 5.4 % (ref 0–5.6)

## 2018-11-07 PROCEDURE — 86618 LYME DISEASE ANTIBODY: CPT | Performed by: FAMILY MEDICINE

## 2018-11-07 PROCEDURE — 83036 HEMOGLOBIN GLYCOSYLATED A1C: CPT | Performed by: FAMILY MEDICINE

## 2018-11-07 PROCEDURE — 99214 OFFICE O/P EST MOD 30 MIN: CPT | Performed by: FAMILY MEDICINE

## 2018-11-07 PROCEDURE — 36415 COLL VENOUS BLD VENIPUNCTURE: CPT | Performed by: FAMILY MEDICINE

## 2018-11-07 ASSESSMENT — ANXIETY QUESTIONNAIRES
7. FEELING AFRAID AS IF SOMETHING AWFUL MIGHT HAPPEN: NOT AT ALL
GAD7 TOTAL SCORE: 1
3. WORRYING TOO MUCH ABOUT DIFFERENT THINGS: NOT AT ALL
IF YOU CHECKED OFF ANY PROBLEMS ON THIS QUESTIONNAIRE, HOW DIFFICULT HAVE THESE PROBLEMS MADE IT FOR YOU TO DO YOUR WORK, TAKE CARE OF THINGS AT HOME, OR GET ALONG WITH OTHER PEOPLE: NOT DIFFICULT AT ALL
6. BECOMING EASILY ANNOYED OR IRRITABLE: SEVERAL DAYS
2. NOT BEING ABLE TO STOP OR CONTROL WORRYING: NOT AT ALL
1. FEELING NERVOUS, ANXIOUS, OR ON EDGE: NOT AT ALL
5. BEING SO RESTLESS THAT IT IS HARD TO SIT STILL: NOT AT ALL

## 2018-11-07 ASSESSMENT — PATIENT HEALTH QUESTIONNAIRE - PHQ9
SUM OF ALL RESPONSES TO PHQ QUESTIONS 1-9: 12
5. POOR APPETITE OR OVEREATING: NOT AT ALL

## 2018-11-07 NOTE — MR AVS SNAPSHOT
After Visit Summary   11/7/2018    Garrett Del Rio    MRN: [id number]           Patient Information     Date Of Birth          1945        Visit Information        Provider Department      11/7/2018 12:20 PM Frannie Talavera MD [facility]        Today's Diagnoses     Fatigue, unspecified type    -  1    ADOLFO (obstructive sleep apnea)- moderate (AHI 21)        Gastroesophageal reflux disease without esophagitis          Care Instructions    [facility]   Discharged by : Janki Foss CMA    If you have any questions regarding your visit please contact your care team:     Team Gold                Clinic Hours Telephone Number     Dr. Cuca Bonilla, CNP  Janneth Seymour, CNP 7am-7pm  Monday - Thursday   7am-5pm  Fridays  [phone]   (Appointment scheduling available 24/7)     RN Line  [phone] option 2     Urgent Care - [place] - 11am-9pm Monday-Friday Saturday-Sunday- 9am-5pm     [place] -   5pm-9pm Monday-Friday Saturday-Sunday- 9am-5pm    [phone] - [place]    [phone] - [place]       For a Price Quote for your services, please call our Consumer Price Line at [phone].     What options do I have for visits at the clinic other than the traditional office visit?     To expand how we care for you, many of our providers are utilizing electronic visits (e-visits) and telephone visits, when medically appropriate, for interactions with their patients rather than a visit in the clinic. We also offer nurse visits for many medical concerns. Just like any other service, we will bill your insurance company for this type of visit based on time spent on the phone with your provider. Not all insurance companies cover these visits. Please check with your medical insurance if this type of visit is covered. You will be responsible for any  charges that are not paid by your insurance.   E-visits via Avenger Networkshart: generally incur a $35.00 fee.     Telephone visits:  Time spent on the phone: *charged based on time that is spent on the phone in increments of 10 minutes. Estimated cost:   5-10 mins $30.00   11-20 mins. $59.00   21-30 mins. $85.00       Use Avenger Networkshart (secure email communication and access to your chart) to send your primary care provider a message or make an appointment. Ask someone on your Team how to sign up for [employer].     As always, Thank you for trusting us with your health care needs!      [facility]:    Scheduling Appointments  [facility]  Call: [phone]    [facility]ane [facility]enhall [facility]  Call: [phone]    [facility]  Call: [phone]    For Gastroenterology referrals   [facility]enterology   Clinics and Surgery Center, 4th Floor   [address] [place] [address]   Appointments: [phone]    WHERE TO GO FOR CARE?  Clinic    Make an appointment if you:       Are sick (cold, cough, flu, sore throat, earache or in pain).       Have a small injury (sprain, small cut, burn or broken bone).       Need a physical exam, Pap smear, vaccine or prescription refill.       Have questions about your health or medicines.    To reach us:      Call [phone] ([phone]). Open 24 hours every day. (For counseling services, call [phone].)    Log into [employer] at [email or web address]. (Visit [email or web address] to create an account.) Hospital emergency room    An emergency is a serious or life- threatening problem that must be treated right away.    Call 704 or get to the hospital if you have:      Very bad or sudden:            - Chest pain or pressure         - Bleeding         - Head or belly pain         - Dizziness or trouble seeing, walking or                          Speaking      Problems breathing      Blood in your vomit or you are  coughing up blood      A major injury (knocked out, loss of a finger or limb, rape, broken bone protruding from skin)    A mental health crisis. (Or call the Mental Health Crisis line at 1-570.642.2875 or Suicide Prevention Hotline at 1-477.754.7579.)    Open 24 hours every day. You don't need an appointment.     Urgent care    Visit urgent care for sickness or small injuries when the clinic is closed. You don't need an appointment. To check hours or find an urgent care near you, visit [email or web address]. Online care    Get online care from [employer] for more than 70 common problems, like colds, allergies and infections. Open 24 hours every day at:   [email or web address]   Need help deciding?    For advice about where to be seen, you may call your clinic and ask to speak with a nurse. We're here for you 24 hours every day.         If you are deaf or hard of hearing, please let us know. We provide many free services including sign language interpreters, oral interpreters, TTYs, telephone amplifiers, note takers and written materials.         3            Follow-ups after your visit        Follow-up notes from your care team     Return in about 3 months (around 2/7/2019) for Mood, Mood/Mental Health Visit.      Your next 10 appointments already scheduled     Jan 28, 2019 11:45 AM CST   CT CHEST W/O CONTRAST with MGCT1   [facility]    [address] [place] [address] [phone]           How do I prepare for my exam? (Food and drink instructions) No Food and Drink Restrictions.  How do I prepare for my exam? (Other instructions) You do not need to do anything special to prepare for this exam. For a sinus scan: Use your nose spray (nasal decongestant spray) as directed.  What should I wear: Please wear loose clothing, such as a sweat suit or jogging clothes. Avoid snaps, zippers and other metal. We may ask you to undress and put on a hospital gown.  How long does  the exam take: Most scans take less than 20 minutes.  What should I bring: Please bring any scans or X-rays taken at other hospitals, if similar tests were done. Also bring a list of your medicines, including vitamins, minerals and over-the-counter drugs. It is safest to leave personal items at home.  Do I need a : No  is needed.  What do I need to tell my doctor? Be sure to tell your doctor: * If you have any allergies. * If there s any chance you are pregnant. * If you are breastfeeding.  What should I do after the exam: No restrictions, You may resume normal activities.  What is this test: A CT (computed tomography) scan is a series of pictures that allows us to look inside your body. The scanner creates images of the body in cross sections, much like slices of bread. This helps us see any problems more clearly.  Who should I call with questions: If you have any questions, please call the Imaging Department where you will have your exam. Directions, parking instructions, and other information is available on our website, [email or web address].            Jan 28, 2019 12:30 PM CST   Return Visit with Jorge Lopez MD   [facility]    [address] [place] [address] [phone]              Who to contact     If you have questions or need follow up information about today's clinic visit or your schedule please contact [facility] directly at [phone].  Normal or non-critical lab and imaging results will be communicated to you by MyChart, letter or phone within 4 business days after the clinic has received the results. If you do not hear from us within 7 days, please contact the clinic through MyChart or phone. If you have a critical or abnormal lab result, we will notify you by phone as soon as possible.  Submit refill requests through [employer] or call your pharmacy and they will forward the refill request to us.  "Please allow 3 business days for your refill to be completed.          Additional Information About Your Visit        MyChart Information     Buyoohart gives you secure access to your electronic health record. If you see a primary care provider, you can also send messages to your care team and make appointments. If you have questions, please call your primary care clinic.  If you do not have a primary care provider, please call [phone] and they will assist you.        Care EveryWhere ID     This is your Care EveryWhere ID. This could be used by other organizations to access your [place] medical records  [id number]        Your Vitals Were     Pulse Temperature Height Head Circumference BMI (Body Mass Index)       84 97.9  F (36.6  C) (Oral) 5' 10\" (1.778 m) 96\" (243.8 cm) 27.26 kg/m2        Blood Pressure from Last 3 Encounters:   11/07/18 137/70   10/18/18 162/84   10/01/18 144/75    Weight from Last 3 Encounters:   11/07/18 190 lb (86.2 kg)   10/18/18 188 lb 7.9 oz (85.5 kg)   10/01/18 192 lb 8 oz (87.3 kg)              Today, you had the following     No orders found for display         Where to get your medicines      These medications were sent to [facility] [address] [place] [address] [address] [facility]  [address] [facility] [address]     Phone:  [phone]     omeprazole 20 MG CR capsule          Primary Care Provider Office Phone # Fax #    Frannie Maulik Talavera [phone]       [facility] [address]        Equal Access to Services     [facility] HospitalNEIDA : Hadii rody palacios hadgustavo Soemmett, waaxda luqadaha, qaybta kaalmaveronica inman. So [facility] [phone].    ATENCIÓN: Si habla español, tiene a del toro disposición servicios gratuitos de asistencia lingüística. Llame al [phone].    We comply with applicable federal civil rights laws and [place] laws. We do not discriminate on the basis of race, color, " national origin, age, disability, sex, sexual orientation, or gender identity.            Thank you!     Thank you for choosing [facility]  for your care. Our goal is always to provide you with excellent care. Hearing back from our patients is one way we can continue to improve our services. Please take a few minutes to complete the written survey that you may receive in the mail after your visit with us. Thank you!             Your Updated Medication List - Protect others around you: Learn how to safely use, store and throw away your medicines at www.disposemymeds.org.          This list is accurate as of 11/7/18  1:04 PM.  Always use your most recent med list.                   Brand Name Dispense Instructions for use Diagnosis    * aspirin 81 MG tablet      Take 1 tablet by mouth At Bedtime        * ASPIRIN PO      Take 325 mg by mouth daily 2 tablets daily for arthritis        Calcium-Magnesium-Zinc-D3 Tabs      Take 1 tablet by mouth 2 times daily        EPINEPHrine 0.3 MG/0.3ML injection    EPIPEN 2-NETO    2 each    Inject 0.3 mLs into the muscle once as needed for anaphylaxis for 1 dose.    Exposure       omeprazole 20 MG CR capsule    priLOSEC    90 capsule    Take 1 capsule (20 mg) by mouth daily    Gastroesophageal reflux disease without esophagitis       order for DME     1 Units    CPAP 9 cm H20    ADOLFO (obstructive sleep apnea)       RESVERATROL PO      Take 300 mg by mouth daily        VITAMIN C CR PO      Take  by mouth.        * Notice:  This list has 2 medication(s) that are the same as other medications prescribed for you. Read the directions carefully, and ask your doctor or other care provider to review them with you.

## 2018-11-07 NOTE — PATIENT INSTRUCTIONS
[facility]   Discharged by : Janki Foss CMA    If you have any questions regarding your visit please contact your care team:     Team Gold                Clinic Hours Telephone Number     Dr. Cuca Boinlla, CNP  Janneth Lion, CNP 7am-7pm  Monday - Thursday   7am-5pm  Fridays  [phone]   (Appointment scheduling available 24/7)     RN Line  [phone] option 2     [facility] [place] [facility]pm Monday-Friday Saturday-Sunday- 9am-5pm     [place] -   5pm-9pm Monday-Friday Saturday-Sunday- 9am-5pm    [phone] - [place]    [phone] - [place]       For a Price Quote for your services, please call our [employer] Price Line at [phone].     What options do I have for visits at the clinic other than the traditional office visit?     To expand how we care for you, many of our providers are utilizing electronic visits (e-visits) and telephone visits, when medically appropriate, for interactions with their patients rather than a visit in the clinic. We also offer nurse visits for many medical concerns. Just like any other service, we will bill your insurance company for this type of visit based on time spent on the phone with your provider. Not all insurance companies cover these visits. Please check with your medical insurance if this type of visit is covered. You will be responsible for any charges that are not paid by your insurance.   E-visits via [employer]ot[employer] Technology: generally incur a $35.00 fee.     Telephone visits:  Time spent on the phone: *charged based on time that is spent on the phone in increments of 10 minutes. Estimated cost:   5-10 mins $30.00   11-20 mins. $59.00   21-30 mins. $85.00       Use [employer] Technology (secure email communication and access to your chart) to send your primary care provider a message or make an appointment. Ask someone on your Team how to sign up for  [facility].     As always, Thank you for trusting us with your health care needs!      [facility]:    Scheduling Appointments  [facility]  Call: [phone]    [facility]  Call: [phone]    [facility]  Call: [phone]    For Gastroenterology referrals   [facility] Gastroenterology   Clinics and Surgery Center, 4th Floor   [address] [place] [address]   Appointments: [phone]    WHERE TO GO FOR CARE?  Clinic    Make an appointment if you:       Are sick (cold, cough, flu, sore throat, earache or in pain).       Have a small injury (sprain, small cut, burn or broken bone).       Need a physical exam, Pap smear, vaccine or prescription refill.       Have questions about your health or medicines.    To reach us:      Call [phone] ([phone]). Open 24 hours every day. (For counseling services, call [phone].)    Log into [facility] at [email or web address]. (Visit [email or web address] to create an account.) Hospital emergency room    An emergency is a serious or life- threatening problem that must be treated right away.    Call 697 or get to the hospital if you have:      Very bad or sudden:            - Chest pain or pressure         - Bleeding         - Head or belly pain         - Dizziness or trouble seeing, walking or                          Speaking      Problems breathing      Blood in your vomit or you are coughing up blood      A major injury (knocked out, loss of a finger or limb, rape, broken bone protruding from skin)    A mental health crisis. (Or call the Mental Health Crisis line at 1-879.720.1873 or Suicide Prevention Hotline at 1-802.983.5797.)    Open 24 hours every day. You don't need an appointment.     Urgent care    Visit urgent care for sickness or small injuries when the clinic is closed. You don't need an appointment. To check hours or find an urgent care near you,  visit [email or web address]. Online care    Get online care from [facility] for more than 70 common problems, like colds, allergies and infections. Open 24 hours every day at:   [email or web address]   Need help deciding?    For advice about where to be seen, you may call your clinic and ask to speak with a nurse. We're here for you 24 hours every day.         If you are deaf or hard of hearing, please let us know. We provide many free services including sign language interpreters, oral interpreters, TTYs, telephone amplifiers, note takers and written materials.

## 2018-11-07 NOTE — PROGRESS NOTES
"  SUBJECTIVE:   Garrett Del Rio is a 72 year old male who presents to clinic today for the following health issues:  Accompanied by his wife      Fatigue:  Patient has complain about daily fatigue for quite some time, but it has never been his main concern. He has mentioned in the past that he was needed a nap during the day, but he did not seem too concerned about this. Today he reports that he has been waking up earlier in the morning, around 2:30-3:00 am and is unable to fall back to sleep. He will lay in bed and try to fall back to sleep, but can't. He states that he usually sets his alarm for 4 am for work, but he very rarely makes it to 4 am. He goes to bed around 8:30-9:00 pm. He states that he has been getting about 5-6 hours of sleep for a long time. He states that if he could, he would like to sleep until 9 am. When he does not have to work, he will stay up later and sleep until about 6 am at the latest. When he naps it is about an hour, and this is usually about twice a day. He naps on the weekend as well.   He has tried to not use his CPAP machine for about 2 weeks, but returned to using it when his wife reported that he was having episodes of apnea frequently. He has had issues with his CPAP disrupting his sleep. He states that without his CPAP, his had improved.   Patient reports depression. He states that the world is a tough place to be in. 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 He states that he does not have any relatives, or many friends, but reports that he does not care about this. He feels that friends are just going to \"stab you in the back\". He had become close to one of his friend's children, and was buying her gifts, then something came of the situation and he lost his job with [employer].   This is very hard for him. He has children, but notes " "that they have busy lives and that he sees them \"when they need money\". He feels he is closer to the kids on the bus than he is with his own right now. He feels that the kids he works with are what keep him going and making his day each day. He feels that without his bus route he is \"worthless\".  He is worried about his wife worrying about him.   Patient states, \"I am not looking to put a gun to my head\"    Abdominal Pain:  Patient states that he has not been having much abdominal pain. He has some bloating today with gas. He has seen a surgeon for his possible hernia, but has not yet scheduled a hernia repair as he would like to wait until the warmer months to do this.     Problem list and histories reviewed & adjusted, as indicated.  Additional history: as documented    Patient Active Problem List   Diagnosis     GERD (gastroesophageal reflux disease)     Esophageal spasms     CARDIOVASCULAR SCREENING; LDL GOAL LESS THAN 160     Hypertension goal BP (blood pressure) < 140/90     OA (OSTEOARTHRITIS) OF KNEE - left     Prostate cancer screening     ADOLFO (obstructive sleep apnea)- moderate (AHI 21)     Mantoux: positive     Advanced directives, counseling/discussion     Hiatal hernia     Colon polyp     Diverticulosis of large intestine     Psychophysiologic insomnia     Nonalcoholic fatty liver disease     Lower abdominal pain     Past Surgical History:   Procedure Laterality Date     CARDIAC STRESS TST,COMPLETE  2006     HC KNEE SCOPE,MED/LAT MENISECTOMY  11/11/11    left, with partial medial menisectomy ONLY       Social History   Substance Use Topics     Smoking status: Former Smoker     Packs/day: 1.00     Years: 35.00     Types: Cigarettes, Cigars, Pipe     Start date: 6/6/1963     Quit date: 10/13/1998     Smokeless tobacco: Never Used     Alcohol use Yes      Comment: about a beer a week      Family History   Problem Relation Age of Onset     Prostate Cancer Father      C.A.D. Father      Diabetes Paternal " "Grandfather      Hypertension No family hx of      Cancer No family hx of          Current Outpatient Prescriptions   Medication Sig Dispense Refill     Ascorbic Acid (VITAMIN C CR PO) Take  by mouth.       aspirin 81 MG tablet Take 1 tablet by mouth At Bedtime        ASPIRIN PO Take 325 mg by mouth daily 2 tablets daily for arthritis       EPINEPHrine (EPIPEN 2-NETO) 0.3 MG/0.3ML injection Inject 0.3 mLs into the muscle once as needed for anaphylaxis for 1 dose. 2 each 3     Multiple Minerals-Vitamins (CALCIUM-MAGNESIUM-ZINC-D3) TABS Take 1 tablet by mouth 2 times daily       omeprazole (PRILOSEC) 20 MG CR capsule Take 1 capsule (20 mg) by mouth daily 90 capsule 3     order for DME CPAP 9 cm H20 1 Units 0     RESVERATROL PO Take 300 mg by mouth daily        Allergies   Allergen Reactions     Septra [Sulfamethoxazole W-Trimethoprim]        Reviewed and updated as needed this visit by clinical staff  Tobacco  Allergies  Meds  Problems  Med Hx  Surg Hx  Fam Hx  Soc Hx        Reviewed and updated as needed this visit by Provider  Allergies  Meds  Problems         ROS:  Constitutional, HEENT, cardiovascular, pulmonary, gi and gu systems are negative, except as otherwise noted.    This document serves as a record of the services and decisions personally performed by AIME BENTLEY. It was created on his/her behalf by Geraldine Leyva, a trained medical scribe. The creation of this document is based on the provider's statements to the medical scribe. Geraldine Leyva, November 7, 2018 12:27 PM    OBJECTIVE:     /70  Pulse 84  Temp 97.9  F (36.6  C) (Oral)  Ht 1.778 m (5' 10\")  Wt 86.2 kg (190 lb)  .8 cm  BMI 27.26 kg/m2  Body mass index is 27.26 kg/(m^2).  GENERAL: healthy, alert and no distress  NEURO: Normal strength and tone, mentation intact and speech normal  PSYCH: mentation appears normal, affect somewhat flat but appropriate      ASSESSMENT/PLAN:     (R53.83) Fatigue, unspecified type  " (primary encounter diagnosis)  Comment: Patient reports daily fatigue.  Plan: I am concerned the patient's fatigue is due to low mood and lack of sleep. 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 Patient did not believe that medication would be helpful at this time. Encouraged he and his wife to have some honest conversations about what might be best in terms of work - and developing other ways to feel supported as well as engage with people he can support and therefore feel valued.  Could consider volunteer work within the school district where his wife works.    (G47.33) ADOLFO (obstructive sleep apnea)- moderate (AHI 21)  Comment: Patient has difficulty sleeping with his mask.   Plan: I advised the patient to complete his sleep study to see if there are other treatment options that would work better for him. We have discussed seeing ENT in the past.    (K21.9) Gastroesophageal reflux disease without esophagitis  Comment: Well controlled on current medications.  Plan: omeprazole (PRILOSEC) 20 MG CR capsule        Continue current medications.    40 minutes spent with patient>50% in counseling regarding mood      Patient Instructions     [facility]   Discharged by : Janki Foss CMA    If you have any questions regarding your visit please contact your care team:     Team Gold                Clinic Hours Telephone Number     Dr. Cuca Bonilla, ALEC Seymour, CNP 7am-7pm  Monday - Thursday   7am-5pm  Fridays  [phone]   (Appointment scheduling available 24/7)     RN Line  [phone] option 2     Urgent Care - [place] Gonzalez and [place] Gonzalez - 11am-9pm Monday-Friday Saturday-Sunday- 9am-5pm     [place] -   5pm-9pm Monday-Friday Saturday-Sunday- 9am-5pm    [phone] - Mera Gonzalez    [phone] - Lee       For a Price Quote for your services, please call our Consumer Price Line at  [phone].     What options do I have for visits at the clinic other than the traditional office visit?     To expand how we care for you, many of our providers are utilizing electronic visits (e-visits) and telephone visits, when medically appropriate, for interactions with their patients rather than a visit in the clinic. We also offer nurse visits for many medical concerns. Just like any other service, we will bill your insurance company for this type of visit based on time spent on the phone with your provider. Not all insurance companies cover these visits. Please check with your medical insurance if this type of visit is covered. You will be responsible for any charges that are not paid by your insurance.   E-visits via [employer]: generally incur a $35.00 fee.     Telephone visits:  Time spent on the phone: *charged based on time that is spent on the phone in increments of 10 minutes. Estimated cost:   5-10 mins $30.00   11-20 mins. $59.00   21-30 mins. $85.00       Use [employer] (secure email communication and access to your chart) to send your primary care provider a message or make an appointment. Ask someone on your Team how to sign up for [employer].     As always, Thank you for trusting us with your health care needs!      [facility]:    Scheduling Appointments  [place] [place], [facility]  Call: [phone]    [facility]  Call: [phone]    [facility]  Call: [phone]    For Gastroenterology referrals   [facility]   Clinics and Surgery Center, 4th Floor   [address] [place] [id number]   Appointments: [phone]    WHERE TO GO FOR CARE?  Clinic    Make an appointment if you:       Are sick (cold, cough, flu, sore throat, earache or in pain).       Have a small injury (sprain, small cut, burn or broken bone).       Need a physical exam, Pap smear, vaccine or prescription refill.       Have  questions about your health or medicines.    To reach us:      Call [phone] ([phone]). Open 24 hours every day. (For counseling services, call [phone].)    Log into [employer] at [employer]. (Visit [email or web address] to create an account.) Hospital emergency room    An emergency is a serious or life- threatening problem that must be treated right away.    Call 911 or get to the hospital if you have:      Very bad or sudden:            - Chest pain or pressure         - Bleeding         - Head or belly pain         - Dizziness or trouble seeing, walking or                          Speaking      Problems breathing      Blood in your vomit or you are coughing up blood      A major injury (knocked out, loss of a finger or limb, rape, broken bone protruding from skin)    A mental health crisis. (Or call the Mental Health Crisis line at 1-813.214.8351 or Suicide Prevention Hotline at 1-155.582.7641.)    Open 24 hours every day. You don't need an appointment.     Urgent care    Visit urgent care for sickness or small injuries when the clinic is closed. You don't need an appointment. To check hours or find an urgent care near you, visit [email or web address]. Online care    Get online care from [facility] for more than 70 common problems, like colds, allergies and infections. Open 24 hours every day at:   [email or web address]   Need help deciding?    For advice about where to be seen, you may call your clinic and ask to speak with a nurse. We're here for you 24 hours every day.         If you are deaf or hard of hearing, please let us know. We provide many free services including sign language interpreters, oral interpreters, TTYs, telephone amplifiers, note takers and written materials.               Frannie Mary MD  [facility]    The information in this document, created by the medical scribe Geraldine Leyva for me, accurately reflects the services I personally performed and the  decisions made by me. I have reviewed and approved this document for accuracy prior to leaving the patient care area.

## 2018-11-08 ASSESSMENT — ANXIETY QUESTIONNAIRES: GAD7 TOTAL SCORE: 1

## 2019-01-28 ENCOUNTER — OFFICE VISIT (OUTPATIENT)
Dept: PULMONOLOGY | Facility: CLINIC | Age: 74
End: 2019-01-28
Payer: COMMERCIAL

## 2019-01-28 ENCOUNTER — ANCILLARY PROCEDURE (OUTPATIENT)
Dept: CT IMAGING | Facility: CLINIC | Age: 74
End: 2019-01-28
Attending: INTERNAL MEDICINE
Payer: COMMERCIAL

## 2019-01-28 VITALS
WEIGHT: 197.9 LBS | SYSTOLIC BLOOD PRESSURE: 134 MMHG | TEMPERATURE: 97.6 F | DIASTOLIC BLOOD PRESSURE: 81 MMHG | HEART RATE: 87 BPM | BODY MASS INDEX: 28.33 KG/M2 | OXYGEN SATURATION: 97 % | HEIGHT: 70 IN | RESPIRATION RATE: 18 BRPM

## 2019-01-28 DIAGNOSIS — R91.8 PULMONARY NODULES: Primary | ICD-10-CM

## 2019-01-28 DIAGNOSIS — R91.8 PULMONARY NODULES: ICD-10-CM

## 2019-01-28 LAB — RADIOLOGIST FLAGS: NORMAL

## 2019-01-28 PROCEDURE — 99215 OFFICE O/P EST HI 40 MIN: CPT | Performed by: INTERNAL MEDICINE

## 2019-01-28 PROCEDURE — 71250 CT THORAX DX C-: CPT | Performed by: RADIOLOGY

## 2019-01-28 RX ORDER — DIPHENOXYLATE HYDROCHLORIDE AND ATROPINE SULFATE 2.5; .025 MG/1; MG/1
1 TABLET ORAL DAILY
COMMUNITY

## 2019-01-28 ASSESSMENT — MIFFLIN-ST. JEOR: SCORE: 1648.92

## 2019-01-28 ASSESSMENT — PAIN SCALES - GENERAL: PAINLEVEL: NO PAIN (0)

## 2019-01-28 NOTE — PROGRESS NOTES
LUNG NODULE & INTERVENTIONAL PULMONARY CLINIC  CLINICS & SURGERY CENTER, [facility], [facility]     Garrett Del Rio MRN# [id number]   Age: 73 year old YOB: 1945     Reason for Consultation: lung nodule(s)       Assessment and Plan:    1. Established multiple pulmonary lung nodule(s). Given the characteristics on current/previous imaging and risk factors; I would classify this to be Intermediate (6-65%) risk for cancer. Previous CT on 10/18 showed decreased in size of the nodules especially the RLL-superior segment nodule. These were not present on CT from 2017 and 2016. Plan 2yr surveillance with next CT in 6mo pending official report.     Billing: I spent more than 30 minutes face to face and greater than 50% of time was for counseling and coordination of care about the issues above.      Jorge Lopez MD   of Medicine  Interventional Pulmonology  Department of Pulmonary, Allergy, Critical Care and Sleep Medicine   [facility]  Pager: [phone]          History:     Garrett Del Rio is a 72 year old male with sig h/o for kidney stones, ADOLFO who is here for evaluation/followup of lung nodule(s).     - No new resp sx or complaints. Denies dyspnea or cough.   - Had CT a/p for pain and found to have incidental pulm nodules. Previous CT 3/2017 without any evidence of nodules. Had RLL nodule with planned ENB however on day of procedure the nodule was smaller and biopsies were deferred.   - Personal hx of cancer: No. Up-to-date on c-scope. No previous skin cancers.  - Family hx of cancer: no lung cancer  - Exposure hx: Denies asbestos or radon exposure   - Tobacco hx: Past Smoker: 0.5ppd for 30years. Quit 35yrs ago.   - My interpretation of the images relevant for this visit includes: new bilateral pulm nodules with mediastinal LAD   - My interpretation of the PFT's relevant for this visit includes: None      Culprit Nodule(s):    Multiple bilateral lung nodule(s) that are sub 8 mm. First seen by chest CT on 18. First observed on this date      Other active medical problems include:   - Has ADOLFO on CPAP.           Past Medical History:      Past Medical History:   Diagnosis Date     Kidney stone 11/10/2011    Diagnosed by ultrasound 10/24/11 3-4 mm      Lung nodule      MEDIAL MENISCUS TEAR - left 10/24/2011           Past Surgical History:      Past Surgical History:   Procedure Laterality Date     CARDIAC STRESS TST,COMPLETE       HC KNEE SCOPE,MED/LAT MENISECTOMY  11    left, with partial medial menisectomy ONLY          Social History:     Social History     Tobacco Use     Smoking status: Former Smoker     Packs/day: 1.00     Years: 35.00     Pack years: 35.00     Types: Cigarettes, Cigars, Pipe     Start date: 1963     Last attempt to quit: 10/13/1998     Years since quittin.3     Smokeless tobacco: Never Used   Substance Use Topics     Alcohol use: Yes     Comment: about a beer a week           Family History:     Family History   Problem Relation Age of Onset     Prostate Cancer Father      C.A.D. Father      Diabetes Paternal Grandfather      Hypertension No family hx of      Cancer No family hx of            Allergies:      Allergies   Allergen Reactions     Septra [Sulfamethoxazole W-Trimethoprim]           Medications:     Current Outpatient Medications   Medication Sig     Ascorbic Acid (VITAMIN C CR PO) Take  by mouth.     aspirin 81 MG tablet Take 1 tablet by mouth At Bedtime      ASPIRIN PO Take 325 mg by mouth daily 2 tablets daily for arthritis     EPINEPHrine (EPIPEN 2-NETO) 0.3 MG/0.3ML injection Inject 0.3 mLs into the muscle once as needed for anaphylaxis for 1 dose.     Multiple Minerals-Vitamins (CALCIUM-MAGNESIUM-ZINC-D3) TABS Take 1 tablet by mouth 2 times daily     Multiple Vitamin (MULTI-VITAMINS) TABS Take 1 tablet by mouth daily     omeprazole (PRILOSEC) 20 MG CR capsule Take 1 capsule (20  mg) by mouth daily     order for DME CPAP 9 cm H20     RESVERATROL PO Take 300 mg by mouth daily      No current facility-administered medications for this visit.           Review of Systems:     CONSTITUTIONAL: negative for fever, chills, change in weight  INTEGUMENTARY/SKIN: no rash or obvious new lesions  ENT/MOUTH: no sore throat, new sinus pain or nasal drainage  RESP: see interval history  CV: negative for chest pain, palpitations or peripheral edema  GI: no nausea, vomiting, change in stools  : no dysuria  MUSCULOSKELETAL: no myalgias, arthralgias  ENDOCRINE: blood sugars with adequate control  PSYCHIATRIC: mood stable  LYMPHATIC: no new lymphadenopathy  HEME: no bleeding or easy bruisability  NEURO: no numbness, weakness, headaches         Physical Exam:     Temp:  [97.6  F (36.4  C)] 97.6  F (36.4  C)  Pulse:  [87] 87  Resp:  [18] 18  BP: (134)/(81) 134/81  SpO2:  [97 %] 97 %  Wt Readings from Last 4 Encounters:   01/28/19 89.8 kg (197 lb 14.4 oz)   11/07/18 86.2 kg (190 lb)   10/18/18 85.5 kg (188 lb 7.9 oz)   10/01/18 87.3 kg (192 lb 8 oz)     Constitutional:   Awake, alert and in no apparent distress     Eyes:   Nonicteric, ESTRELLA     ENT:    Trachea is midline. No gross neck abnormalities      Neck:   Supple without supraclavicular or cervical lymphadenopathy     Lungs:   Good air flow.  No crackles. No rhonchi.  No wheezes.     Cardiovascular:   Normal S1 and S2.  RRR.  No murmur, gallop or rub.  Radial, DP and PT pulses normal and symmetric     Abdomen:   NABS, soft, nontender, nondistended.  No HSM.     Musculoskeletal:   No edema.      Neurologic:   Alert and conversant. Cranial nerves  intact.       Skin:   Warm, dry.  No rash on limited exam.           Current Laboratory Data:   All laboratory and imaging data reviewed.           Previous Cardiology Imaging   No results found for this or any previous visit (from the past 8760 hour(s)).

## 2019-01-28 NOTE — NURSING NOTE
"Garrett Del Rio's goals for this visit include: Return  He requests these members of his care team be copied on today's visit information: PCP    PCP: Frannie Talavera    Referring Provider:  No referring provider defined for this encounter.    /81   Pulse 87   Temp 97.6  F (36.4  C)   Resp 18   Ht 1.778 m (5' 10\")   Wt 89.8 kg (197 lb 14.4 oz)   SpO2 97%   BMI 28.40 kg/m      Do you need any medication refills at today's visit? N    "

## 2019-03-03 ENCOUNTER — TRANSFERRED RECORDS (OUTPATIENT)
Dept: HEALTH INFORMATION MANAGEMENT | Facility: CLINIC | Age: 74
End: 2019-03-03

## 2019-04-11 ENCOUNTER — MYC MEDICAL ADVICE (OUTPATIENT)
Dept: FAMILY MEDICINE | Facility: CLINIC | Age: 74
End: 2019-04-11

## 2019-05-16 ENCOUNTER — OFFICE VISIT (OUTPATIENT)
Dept: FAMILY MEDICINE | Facility: CLINIC | Age: 74
End: 2019-05-16
Payer: COMMERCIAL

## 2019-05-16 VITALS
BODY MASS INDEX: 27.77 KG/M2 | OXYGEN SATURATION: 96 % | HEART RATE: 86 BPM | WEIGHT: 194 LBS | HEIGHT: 70 IN | DIASTOLIC BLOOD PRESSURE: 77 MMHG | SYSTOLIC BLOOD PRESSURE: 148 MMHG | TEMPERATURE: 98.1 F

## 2019-05-16 DIAGNOSIS — G47.00 INSOMNIA, UNSPECIFIED TYPE: ICD-10-CM

## 2019-05-16 DIAGNOSIS — G47.33 OSA (OBSTRUCTIVE SLEEP APNEA): Primary | ICD-10-CM

## 2019-05-16 DIAGNOSIS — F34.1 DYSTHYMIA: ICD-10-CM

## 2019-05-16 DIAGNOSIS — M17.12 PRIMARY OSTEOARTHRITIS OF LEFT KNEE: ICD-10-CM

## 2019-05-16 PROCEDURE — 99214 OFFICE O/P EST MOD 30 MIN: CPT | Performed by: FAMILY MEDICINE

## 2019-05-16 ASSESSMENT — PATIENT HEALTH QUESTIONNAIRE - PHQ9
5. POOR APPETITE OR OVEREATING: NOT AT ALL
SUM OF ALL RESPONSES TO PHQ QUESTIONS 1-9: 13

## 2019-05-16 ASSESSMENT — ANXIETY QUESTIONNAIRES
1. FEELING NERVOUS, ANXIOUS, OR ON EDGE: NOT AT ALL
5. BEING SO RESTLESS THAT IT IS HARD TO SIT STILL: NOT AT ALL
IF YOU CHECKED OFF ANY PROBLEMS ON THIS QUESTIONNAIRE, HOW DIFFICULT HAVE THESE PROBLEMS MADE IT FOR YOU TO DO YOUR WORK, TAKE CARE OF THINGS AT HOME, OR GET ALONG WITH OTHER PEOPLE: NOT DIFFICULT AT ALL
3. WORRYING TOO MUCH ABOUT DIFFERENT THINGS: NOT AT ALL
GAD7 TOTAL SCORE: 1
2. NOT BEING ABLE TO STOP OR CONTROL WORRYING: NOT AT ALL
7. FEELING AFRAID AS IF SOMETHING AWFUL MIGHT HAPPEN: NOT AT ALL
6. BECOMING EASILY ANNOYED OR IRRITABLE: SEVERAL DAYS

## 2019-05-16 ASSESSMENT — MIFFLIN-ST. JEOR: SCORE: 1631.23

## 2019-05-16 NOTE — PATIENT INSTRUCTIONS
The Sports Medicine/Orthopedic  will call you within 24 hours or   OR you may contact the [facility]  at:   [phone]    The mental health  will be contacting you.    And the sleep  will be contacting you.

## 2019-05-16 NOTE — PROGRESS NOTES
"gastr  SUBJECTIVE:   Garrett Del Rio is a 73 year old male who presents to clinic today for the following   health issues:      Abnormal Mood Symptoms  Onset: a while    Description:   Depression: YES  Anxiety: no    Accompanying Signs & Symptoms:  Still participating in activities that you used to enjoy: no  Fatigue: YES  Irritability: YES  Difficulty concentrating: no  Changes in appetite: no  Problems with sleep: YES  Heart racing/beating fast : no  Thoughts of hurting yourself or others: none    History:   Recent stress: no  Prior depression hospitalization: None  Family history of depression: no  Family history of anxiety: no    Precipitating factors:   Alcohol/drug use: no    Alleviating factors:      Therapies Tried and outcome: None    Left knee pain -started up again about two weeks ago.  Has had nonsteroidal injections which didn't help.  Has had arthroscopy.  Was told next step was knee replacement.  Since then has had ultrasound guided non-steroid injection which have been very helpful.    Has not yet seen sleep specialist to recheck sleep apnea.      Plans to take care of hernia surgery this summer.    Is an only child, which contributes to loneliness.        Additional history: as documented    Reviewed  and updated as needed this visit by clinical staff         Reviewed and updated as needed this visit by Provider  Allergies  Meds  Problems         BP Readings from Last 3 Encounters:   05/16/19 148/77   01/28/19 134/81   11/07/18 137/70    Wt Readings from Last 3 Encounters:   05/16/19 88 kg (194 lb)   01/28/19 89.8 kg (197 lb 14.4 oz)   11/07/18 86.2 kg (190 lb)                    ROS:  Constitutional, HEENT, cardiovascular, pulmonary, gi and gu systems are negative, except as otherwise noted.    OBJECTIVE:     /77   Pulse 86   Temp 98.1  F (36.7  C) (Oral)   Ht 1.778 m (5' 10\")   Wt 88 kg (194 lb)   SpO2 96%   BMI 27.84 kg/m    Body mass index is 27.84 kg/m .  GENERAL: healthy, alert " and no distress  PSYCH: mentation appears normal and affect flat    Diagnostic Test Results:  none     ASSESSMENT/PLAN:             (G47.33) ADOLFO (obstructive sleep apnea)- moderate (AHI 21)  (primary encounter diagnosis)  Comment: encouraged follow up, as we have discussed in the past, with a different sleep specialist.  Plan: SLEEP EVALUATION & MANAGEMENT REFERRAL - ADULT         -[facility]  [phone] (Age 2 and up)         (Dr. Feldman )        Discussed that even if he doesn't use his CPAP prior to the appointment, a sleep study will still be valid.  Can discuss alternative therapies    (G47.00) Insomnia, unspecified type  Comment: likely is also contributing to his fatigue.    Plan: SLEEP EVALUATION & MANAGEMENT REFERRAL - [facility]  [phone] (Age 2 and up)         (Dr. Feldman )        Was not encouraged by being told to read about insomnia.      (F34.1) Dysthymia  Comment: long discussion about this and options  Plan: MENTAL HEALTH REFERRAL  - Adult; Outpatient         Treatment; Individual/Couples/Family/Group         Therapy/Health Psychology; [facility]: [facility] [phone]; We will         contact you to schedule the appointment or         please call with any questions        We discussed options of therapy, medications, lifestyle adjustments, lifestyle choices.  He does agree to trial of working with a therapist.    (M17.12) Primary osteoarthritis of left knee  Comment: benefitted from ultrasound guided synvisc injections in the past.  Does not want to consider a knee replacement at this time  Plan: ORTHO  REFERRAL              See Patient Instructions    Frannie Mary MD  [facility]

## 2019-05-17 ASSESSMENT — ANXIETY QUESTIONNAIRES: GAD7 TOTAL SCORE: 1

## 2019-05-20 ENCOUNTER — MYC MEDICAL ADVICE (OUTPATIENT)
Dept: FAMILY MEDICINE | Facility: CLINIC | Age: 74
End: 2019-05-20

## 2019-05-20 NOTE — TELEPHONE ENCOUNTER
Routing [place] message to Dr. Talavera.  Please review and advise.    Thank you.    Tha Eden RN

## 2019-05-21 NOTE — TELEPHONE ENCOUNTER
I believe the order I placed was for the [place] location?  Because I thought that was the location where Dr. Feldman practices?    Could you contact the sleep clinic and find out where Dr. Feldman practices and find out if we can help schedule the patient for a consult?

## 2019-05-21 NOTE — TELEPHONE ENCOUNTER
Called number listed on referral ([phone]) which is for pediatrics specialty. They informed me Dr. Feldman practices out of the [facility], scheduling phone [phone]

## 2019-05-23 ENCOUNTER — TELEPHONE (OUTPATIENT)
Dept: FAMILY MEDICINE | Facility: CLINIC | Age: 74
End: 2019-05-23

## 2019-05-23 NOTE — TELEPHONE ENCOUNTER
"Patient calling back to discuss sleep referral. He states that he is not willing to go to the [place] or [place]. He states the provider that he seen at [place] was a gregoria, said all of his problems were from insomnia. Patient states they did not do a sleep study. He states that he does not have insomnia, he has sleep apnea. He wants a referral placed to a provider that is not \"in the [facility]\". Please contact patient to discuss.   "

## 2019-05-23 NOTE — TELEPHONE ENCOUNTER
Spoke to Garrett. He would like to get his CPAP supplies from [facility]. This is where he originally got his CPAP. Then he started getting supplies from a different

## 2019-05-23 NOTE — TELEPHONE ENCOUNTER
Reason for Call: Request for an order or referral:    Order or referral being requested: Patient needs an order for new CPAP supplies. He previously got his supplies at the [facility]. He is not sure what he is supposed to do or where he is supposed to go to get these supplies?     Date needed: as soon as possible    Has the patient been seen by the PCP for this problem? Not Applicable    Additional comments:     Phone number Patient can be reached at:  Home number on file [phone] (home)    Best Time:  any    Can we leave a detailed message on this number?  YES    Call taken on 5/23/2019 at 9:13 AM by Emily Alfonso

## 2019-05-23 NOTE — TELEPHONE ENCOUNTER
Information for Dr. Feldman provided to patient by RN in another encounter.    Thank you,  Leonarda EGAN    NE Team Emilia

## 2019-06-10 ENCOUNTER — ANCILLARY PROCEDURE (OUTPATIENT)
Dept: CT IMAGING | Facility: CLINIC | Age: 74
End: 2019-06-10
Payer: COMMERCIAL

## 2019-06-10 ENCOUNTER — OFFICE VISIT (OUTPATIENT)
Dept: NURSING | Facility: CLINIC | Age: 74
End: 2019-06-10
Payer: COMMERCIAL

## 2019-06-10 ENCOUNTER — OFFICE VISIT (OUTPATIENT)
Dept: PULMONOLOGY | Facility: CLINIC | Age: 74
End: 2019-06-10
Payer: COMMERCIAL

## 2019-06-10 VITALS
TEMPERATURE: 97.1 F | WEIGHT: 194 LBS | SYSTOLIC BLOOD PRESSURE: 145 MMHG | RESPIRATION RATE: 17 BRPM | DIASTOLIC BLOOD PRESSURE: 75 MMHG | OXYGEN SATURATION: 97 % | HEIGHT: 70 IN | BODY MASS INDEX: 27.77 KG/M2 | HEART RATE: 73 BPM

## 2019-06-10 DIAGNOSIS — R91.8 PULMONARY NODULES: Primary | ICD-10-CM

## 2019-06-10 DIAGNOSIS — R91.8 PULMONARY NODULES: ICD-10-CM

## 2019-06-10 PROCEDURE — 94729 DIFFUSING CAPACITY: CPT | Performed by: INTERNAL MEDICINE

## 2019-06-10 PROCEDURE — 71250 CT THORAX DX C-: CPT | Performed by: RADIOLOGY

## 2019-06-10 PROCEDURE — 94726 PLETHYSMOGRAPHY LUNG VOLUMES: CPT | Performed by: INTERNAL MEDICINE

## 2019-06-10 PROCEDURE — 99214 OFFICE O/P EST MOD 30 MIN: CPT | Performed by: INTERNAL MEDICINE

## 2019-06-10 ASSESSMENT — PAIN SCALES - GENERAL: PAINLEVEL: SEVERE PAIN (6)

## 2019-06-10 ASSESSMENT — MIFFLIN-ST. JEOR: SCORE: 1631.23

## 2019-06-10 NOTE — PROGRESS NOTES
LUNG NODULE & INTERVENTIONAL PULMONARY CLINIC  [facility], [facility], [facility]     Garrett Del Rio MRN# [id number]   Age: 73 year old YOB: 1945     Reason for Consultation: lung nodule(s)     Assessment and Plan:       1. Established multiple pulmonary lung nodule(s). Given the characteristics on current/previous imaging and risk factors; I would classify this to be Intermediate (6-65%) risk for cancer. Previous CT on 10/18 showed decreased in size of the nodules especially the RLL-superior segment nodule. These were not present on CT from 2017 and 2016. Plan 2yr surveillance with next CT in 9mo.     Billing: I spent more than 30 minutes face to face and greater than 50% of time was for counseling and coordination of care about the issues above.     Jorge Lopez MD   of Medicine  Interventional Pulmonology  Department of Pulmonary, Allergy, Critical Care and Sleep Medicine   [facility]  Pager: [phone]          History:        Garrett Del Rio is a 72 year old male with sig h/o for kidney stones, ADOLFO who is here for evaluation/followup of lung nodule(s).     - No new resp sx or complaints. Denies dyspnea or cough.   - Had CT a/p for pain and found to have incidental pulm nodules. Previous CT 3/2017 without any evidence of nodules. Had RLL nodule with planned ENB however on day of procedure the nodule was smaller and biopsies were deferred. Here for f/u  - Personal hx of cancer: No. Up-to-date on c-scope. No previous skin cancers.  - Family hx of cancer: no lung cancer  - Exposure hx: Denies asbestos or radon exposure   - Tobacco hx: Past Smoker: 0.5ppd for 30years. Quit 35yrs ago.   - My interpretation of the images relevant for this visit includes: new bilateral pulm nodules with mediastinal LAD   - My interpretation of the PFT's relevant for this visit includes:Normal     Culprit Nodule(s):   Multiple  bilateral lung nodule(s) that are sub 8 mm. First seen by chest CT on 18. First observed on this date      Other active medical problems include:   - Has ADOLFO on CPAP.           Past Medical History:      Past Medical History:   Diagnosis Date     Kidney stone 11/10/2011    Diagnosed by ultrasound 10/24/11 3-4 mm      Lung nodule      MEDIAL MENISCUS TEAR - left 10/24/2011           Past Surgical History:      Past Surgical History:   Procedure Laterality Date     CARDIAC STRESS TST,COMPLETE       HC KNEE SCOPE,MED/LAT MENISECTOMY  11    left, with partial medial menisectomy ONLY          Social History:     Social History     Tobacco Use     Smoking status: Former Smoker     Packs/day: 1.00     Years: 35.00     Pack years: 35.00     Types: Cigarettes, Cigars, Pipe     Start date: 1963     Last attempt to quit: 10/13/1998     Years since quittin.6     Smokeless tobacco: Never Used   Substance Use Topics     Alcohol use: Yes     Comment: about a beer a week           Family History:     Family History   Problem Relation Age of Onset     Prostate Cancer Father      C.A.D. Father      Diabetes Paternal Grandfather      Hypertension No family hx of      Cancer No family hx of            Allergies:      Allergies   Allergen Reactions     Septra [Sulfamethoxazole W-Trimethoprim]           Medications:     Current Outpatient Medications   Medication Sig     Ascorbic Acid (VITAMIN C CR PO) Take  by mouth.     aspirin 81 MG tablet Take 1 tablet by mouth At Bedtime      ASPIRIN PO Take 325 mg by mouth daily 2 tablets daily for arthritis     EPINEPHrine (EPIPEN 2-NETO) 0.3 MG/0.3ML injection Inject 0.3 mLs into the muscle once as needed for anaphylaxis for 1 dose.     Multiple Minerals-Vitamins (CALCIUM-MAGNESIUM-ZINC-D3) TABS Take 1 tablet by mouth 2 times daily     Multiple Vitamin (MULTI-VITAMINS) TABS Take 1 tablet by mouth daily     omeprazole (PRILOSEC) 20 MG CR capsule Take 1 capsule (20 mg) by  mouth daily     order for DME CPAP 9 cm H20     RESVERATROL PO Take 300 mg by mouth daily      No current facility-administered medications for this visit.           Review of Systems:     CONSTITUTIONAL: negative for fever, chills, change in weight  INTEGUMENTARY/SKIN: no rash or obvious new lesions  ENT/MOUTH: no sore throat, new sinus pain or nasal drainage  RESP: see interval history  CV: negative for chest pain, palpitations or peripheral edema  GI: no nausea, vomiting, change in stools  : no dysuria  MUSCULOSKELETAL: no myalgias, arthralgias  ENDOCRINE: blood sugars with adequate control  PSYCHIATRIC: mood stable  LYMPHATIC: no new lymphadenopathy  HEME: no bleeding or easy bruisability  NEURO: no numbness, weakness, headaches         Physical Exam:     Temp:  [97.1  F (36.2  C)] 97.1  F (36.2  C)  Pulse:  [73] 73  Resp:  [17] 17  BP: (145)/(75) 145/75  SpO2:  [97 %] 97 %  Wt Readings from Last 4 Encounters:   06/10/19 88 kg (194 lb)   05/16/19 88 kg (194 lb)   01/28/19 89.8 kg (197 lb 14.4 oz)   11/07/18 86.2 kg (190 lb)     Constitutional:   Awake, alert and in no apparent distress     Eyes:   Nonicteric, ESTRELLA     ENT:    Trachea is midline. No gross neck abnormalities      Neck:   Supple without supraclavicular or cervical lymphadenopathy     Lungs:   Good air flow.  No crackles. No rhonchi.  No wheezes.     Cardiovascular:   Normal S1 and S2.  RRR.  No murmur, gallop or rub.  Radial, DP and PT pulses normal and symmetric     Abdomen:   NABS, soft, nontender, nondistended.  No HSM.     Musculoskeletal:   No edema.      Neurologic:   Alert and conversant. Cranial nerves  intact.       Skin:   Warm, dry.  No rash on limited exam.           Current Laboratory Data:   All laboratory and imaging data reviewed.    Results for orders placed or performed in visit on 06/10/19 (from the past 24 hour(s))   General PFT Lab (Please always keep checked)   Result Value Ref Range    FVC-Pred 3.96 L    FVC-Pre 4.13 L     FVC-%Pred-Pre 104 %    FEV1-Pre 3.07 L    FEV1-%Pred-Pre 102 %    FEV1FVC-Pred 76 %    FEV1FVC-Pre 74 %    FEFMax-Pred 7.77 L/sec    FEFMax-Pre 9.46 L/sec    FEFMax-%Pred-Pre 121 %    FEF2575-Pred 2.24 L/sec    FEF2575-Pre 2.29 L/sec    ZET1053-%Pred-Pre 102 %    ExpTime-Pre 7.04 sec    FIFMax-Pre 5.16 L/sec    VC-Pred 4.47 L    VC-Pre 4.40 L    VC-%Pred-Pre 98 %    IC-Pred 3.62 L    IC-Pre 3.75 L    IC-%Pred-Pre 103 %    ERV-Pred 0.85 L    ERV-Pre 0.65 L    ERV-%Pred-Pre 76 %    FEV1FEV6-Pred 77 %    FEV1FEV6-Pre 76 %    FRCPleth-Pred 3.65 L    FRCPleth-Pre 2.55 L    FRCPleth-%Pred-Pre 69 %    RVPleth-Pred 2.66 L    RVPleth-Pre 1.89 L    RVPleth-%Pred-Pre 71 %    TLCPleth-Pred 6.87 L    TLCPleth-Pre 6.30 L    TLCPleth-%Pred-Pre 91 %    DLCOunc-Pred 24.48 ml/min/mmHg    DLCOunc-Pre 20.96 ml/min/mmHg    DLCOunc-%Pred-Pre 85 %    VA-Pre 6.06 L    VA-%Pred-Pre 99 %    FEV1SVC-Pred 67 %    FEV1SVC-Pre 70 %            Previous Pulmonary Function Testing     FVC-Pred   Date Value Ref Range Status   06/10/2019 3.96 L      FVC-Pre   Date Value Ref Range Status   06/10/2019 4.13 L      FVC-%Pred-Pre   Date Value Ref Range Status   06/10/2019 104 %      FEV1-Pre   Date Value Ref Range Status   06/10/2019 3.07 L      FEV1-%Pred-Pre   Date Value Ref Range Status   06/10/2019 102 %      FEV1FVC-Pred   Date Value Ref Range Status   06/10/2019 76 %      FEV1FVC-Pre   Date Value Ref Range Status   06/10/2019 74 %      No results found for: 20029  FEFMax-Pred   Date Value Ref Range Status   06/10/2019 7.77 L/sec      FEFMax-Pre   Date Value Ref Range Status   06/10/2019 9.46 L/sec      FEFMax-%Pred-Pre   Date Value Ref Range Status   06/10/2019 121 %      ExpTime-Pre   Date Value Ref Range Status   06/10/2019 7.04 sec      FIFMax-Pre   Date Value Ref Range Status   06/10/2019 5.16 L/sec      FEV1FEV6-Pred   Date Value Ref Range Status   06/10/2019 77 %      FEV1FEV6-Pre   Date Value Ref Range Status   06/10/2019 76 %      No results  found for: 20055         Previous Chest Imaging   No images are attached to the encounter.  No images are attached to the encounter or orders placed in the encounter.         Previous Cardiology Imaging   No results found for this or any previous visit (from the past 8760 hour(s)).

## 2019-06-10 NOTE — NURSING NOTE
"Garrett Del Rio's goals for this visit include: Return  He requests these members of his care team be copied on today's visit information: PCP    PCP: Frannie Talavera    Referring Provider:  No referring provider defined for this encounter.    /75   Pulse 73   Temp 97.1  F (36.2  C)   Resp 17   Ht 1.778 m (5' 10\")   Wt 88 kg (194 lb)   SpO2 97%   BMI 27.84 kg/m      Do you need any medication refills at today's visit? N    "

## 2019-06-11 ENCOUNTER — OFFICE VISIT (OUTPATIENT)
Dept: ORTHOPEDICS | Facility: CLINIC | Age: 74
End: 2019-06-11
Payer: COMMERCIAL

## 2019-06-11 ENCOUNTER — ANCILLARY PROCEDURE (OUTPATIENT)
Dept: GENERAL RADIOLOGY | Facility: CLINIC | Age: 74
End: 2019-06-11
Attending: PEDIATRICS
Payer: COMMERCIAL

## 2019-06-11 VITALS
SYSTOLIC BLOOD PRESSURE: 152 MMHG | HEIGHT: 70 IN | BODY MASS INDEX: 27.49 KG/M2 | DIASTOLIC BLOOD PRESSURE: 80 MMHG | WEIGHT: 192 LBS

## 2019-06-11 DIAGNOSIS — G89.29 CHRONIC PAIN OF LEFT KNEE: ICD-10-CM

## 2019-06-11 DIAGNOSIS — M17.12 PRIMARY OSTEOARTHRITIS OF LEFT KNEE: ICD-10-CM

## 2019-06-11 DIAGNOSIS — G89.29 CHRONIC PAIN OF LEFT KNEE: Primary | ICD-10-CM

## 2019-06-11 DIAGNOSIS — M25.562 CHRONIC PAIN OF LEFT KNEE: ICD-10-CM

## 2019-06-11 DIAGNOSIS — M25.562 CHRONIC PAIN OF LEFT KNEE: Primary | ICD-10-CM

## 2019-06-11 PROCEDURE — 99203 OFFICE O/P NEW LOW 30 MIN: CPT | Performed by: PEDIATRICS

## 2019-06-11 PROCEDURE — 73562 X-RAY EXAM OF KNEE 3: CPT | Performed by: PEDIATRICS

## 2019-06-11 ASSESSMENT — MIFFLIN-ST. JEOR: SCORE: 1622.16

## 2019-06-11 NOTE — PATIENT INSTRUCTIONS
Garrett to follow up with Primary Care provider regarding elevated blood pressure.    Symptom treatment: icing, heat, over the counter medication  Activity modification: as needed for pain  Imaging: x-rays reviewed  Support: Sleeve for compression, bracing; discussed potential of an  brace  Exercise: Benefits of remaining active discussed  Medication: Discussed consideration of anti-inflammatory  Injection therapy: Discussed steroid injections, Visco supplement injections, Biologics including PRP and stem cells, and prolotherapy; and he could be done with imaging guidance if considered  Ultimate option is to see orthopedic surgery.    Contact clinic if any questions or concerns.

## 2019-06-11 NOTE — PROGRESS NOTES
"Sports Medicine Clinic Visit    PCP: Frannie Talavera    Garrett Del Rio is a 73 year old male who is seen  in consultation at the request of  Frannie Talavera M.D. presenting with left knee pain. Patient has history of knee pain.  Has had a visco injection and steroid with Dr. Rajput as well as a scope on 11-11-11 with Dr. Wooten.    He has also seen a clinic where he had repeat visco injections with the use of imaging guidance at another clinic, which he believes is now closed, which were helpful.   He believes his last injection was about 5 years ago  He has begun to have pain again in his knee.  He would like to discuss options, including stem cell.        Injury: ongoing   **  Waxing/waning course with knee pain.    Most recent injection was visco, ~5 years ago. Series of injections. Does not recall benefit from visco initially. Later at different facility had knee \"scoped\" and with that had more relief, including early on.    Had steroid injections prior to visco, with Dr Rajput. Does recall a little relief after that time, but did not have the course that was anticipated.     Location of Pain: left knee  Duration of Pain: 8+ year(s)  Rating of Pain at worst: 5/10  Rating of Pain Currently: 0/10 sitting, today is a good day   Symptoms are better with: visco injections   Symptoms are worse with: stairs  Additional Features:   Positive: instability   Negative: swelling, bruising, popping, grinding, catching, locking, paresthesias, numbness, weakness, pain in other joints and systemic symptoms  Other evaluation and/or treatments so far consists of: previous injections, PT  Prior History of related problems: ongoing     Social History: drives school bus     Review of Systems  Musculoskeletal: as above  Remainder of review of systems is negative including constitutional, CV, pulmonary, GI, Skin and Neurologic except as noted in HPI or medical history.    Past Medical History:   Diagnosis Date     " Kidney stone 11/10/2011    Diagnosed by ultrasound 10/24/11 3-4 mm      Lung nodule      MEDIAL MENISCUS TEAR - left 10/24/2011     Past Surgical History:   Procedure Laterality Date     CARDIAC STRESS TST,COMPLETE       HC KNEE SCOPE,MED/LAT MENISECTOMY  11    left, with partial medial menisectomy ONLY     Family History   Problem Relation Age of Onset     Prostate Cancer Father      C.A.D. 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     Occupation: Drives school Bus   Social Needs     Financial resource strain: Not on file     Food insecurity:     Worry: Not on file     Inability: Not on file     Transportation needs:     Medical: Not on file     Non-medical: Not on file   Tobacco Use     Smoking status: Former Smoker     Packs/day: 1.00     Years: 35.00     Pack years: 35.00     Types: Cigarettes, Cigars, Pipe     Start date: 1963     Last attempt to quit: 10/13/1998     Years since quittin.6     Smokeless tobacco: Never Used   Substance and Sexual Activity     Alcohol use: Yes     Comment: about a beer a week      Drug use: No     Sexual activity: Not Currently     Partners: Female     Birth control/protection: None   Lifestyle     Physical activity:     Days per week: Not on file     Minutes per session: Not on file     Stress: Not on file   Relationships     Social connections:     Talks on phone: Not on file     Gets together: Not on file     Attends Yazdanism service: Not on file     Active member of club or organization: Not on file     Attends meetings of clubs or organizations: Not on file      "Relationship status: Not on file     Intimate partner violence:     Fear of current or ex partner: Not on file     Emotionally abused: Not on file     Physically abused: Not on file     Forced sexual activity: Not on file   Other Topics Concern     Parent/sibling w/ CABG, MI or angioplasty before 65F 55M? No   Social History Narrative     Not on file       Objective  /80   Ht 1.778 m (5' 10\")   Wt 87.1 kg (192 lb)   BMI 27.55 kg/m      GENERAL APPEARANCE: healthy, alert and no distress   GAIT: NORMAL  SKIN: no suspicious lesions or rashes  NEURO: Normal strength and tone, mentation intact and speech normal  PSYCH:  mentation appears normal and affect normal/bright  HEENT: no scleral icterus  CV: no extremity edema  RESP: nonlabored breathing    Left Knee exam    ROM:        No significant change in symptoms with active range of motion    Skin:       no visible deformities       well perfused       capillary refill brisk    Tender:        medial joint line    Remainder of exam deferred in lieu of discussion of options for knee OA      Radiology  Visualized radiographs of left knee obtained today, and reviewed the images with the patient.  Impression: Bilateral PA, bilateral sunrise, and left lateral views of the knees demonstrate moderate left medial compartment narrowing.  This overall appears similar to prior images from 1/2/2014.  There has been some progression of patellofemoral degenerative change bilaterally, when compared to prior images.  There is also a left knee joint effusion present.  No acute osseous abnormality identified.      Assessment:  1. Chronic pain of left knee    2. Primary osteoarthritis of left knee        Plan:  Discussed the assessment with the patient.    Discussed nature of degenerative arthrosis of the knee. Discussed symptom treatment with over-the-counter medications, ice or heat, topical treatments, and rest if needed. Discussed use of compression or bracing for comfort. " Discussed potential benefits of rehabilitation, to maintain or improve function at the knee. Discussed benefits of exercise and weight loss (if applicable) to reduce pressure at the knee. Discussed injection therapy. Also briefly discussed future consideration of referral to orthopedic surgery for further evaluation and discussion of additional treatment options.    For now, he appreciated the discussion and prefers to consider all of his options.  Additionally, see patient instructions.  He may contact the clinic if desiring to pursue any 1 of the options discussed today.  Otherwise, can manage as he has been doing currently, with over-the-counter medication, activity modification, topical treatments.  If considering injection in the future, likely suggest imaging guidance given his past experience with injections.    Follow up: Is as needed.  Questions answered. The patient indicates understanding of these issues and agrees with the plan.    Iban Bruce DO, CAQ    CC: Frannie Talavera        Patient Instructions   Garrett to follow up with Primary Care provider regarding elevated blood pressure.    Symptom treatment: icing, heat, over the counter medication  Activity modification: as needed for pain  Imaging: x-rays reviewed  Support: Sleeve for compression, bracing; discussed potential of an  brace  Exercise: Benefits of remaining active discussed  Medication: Discussed consideration of anti-inflammatory  Injection therapy: Discussed steroid injections, Visco supplement injections, Biologics including PRP and stem cells, and prolotherapy; and he could be done with imaging guidance if considered  Ultimate option is to see orthopedic surgery.    Contact clinic if any questions or concerns.          Note: Time spent in one-on-one evaluation and discussion with patient regarding nature of problem, course, prior treatments, and therapeutic options, along with review of medical record, at least 50%  of which was spent in counseling and coordination of care: over 30 minutes.          Disclaimer: This note consists of symbols derived from keyboarding, dictation and/or voice recognition software. As a result, there may be errors in the script that have gone undetected. Please consider this when interpreting information found in this chart.

## 2019-06-11 NOTE — LETTER
"    6/11/2019         RE: Garrett Del Rio  [address]  [facility] [address]        Dear Colleague,    Thank you for referring your patient, Garrett Del Rio, to the [facility]. Please see a copy of my visit note below.    Sports Medicine Clinic Visit    PCP: Frannie Talavera    Garrett Del Rio is a 73 year old male who is seen  in consultation at the request of  Frannie Talavera M.D. presenting with left knee pain. Patient has history of knee pain.  Has had a visco injection and steroid with Dr. Rajput as well as a scope on 11-11-11 with Dr. Wooten.    He has also seen a clinic where he had repeat visco injections with the use of imaging guidance at another clinic, which he believes is now closed, which were helpful.   He believes his last injection was about 5 years ago  He has begun to have pain again in his knee.  He would like to discuss options, including stem cell.        Injury: ongoing   **  Waxing/waning course with knee pain.    Most recent injection was visco, ~5 years ago. Series of injections. Does not recall benefit from visco initially. Later at different facility had knee \"scoped\" and with that had more relief, including early on.    Had steroid injections prior to visco, with Dr Rajput. Does recall a little relief after that time, but did not have the course that was anticipated.     Location of Pain: left knee  Duration of Pain: 8+ year(s)  Rating of Pain at worst: 5/10  Rating of Pain Currently: 0/10 sitting, today is a good day   Symptoms are better with: visco injections   Symptoms are worse with: stairs  Additional Features:   Positive: instability   Negative: swelling, bruising, popping, grinding, catching, locking, paresthesias, numbness, weakness, pain in other joints and systemic symptoms  Other evaluation and/or treatments so far consists of: previous injections, PT  Prior History of related problems: ongoing     Social History: " drives school bus     Review of Systems  Musculoskeletal: as above  Remainder of review of systems is negative including constitutional, CV, pulmonary, GI, Skin and Neurologic except as noted in HPI or medical history.    Past Medical History:   Diagnosis Date     Kidney stone 11/10/2011    Diagnosed by ultrasound 10/24/11 3-4 mm      Lung nodule      MEDIAL MENISCUS TEAR - left 10/24/2011     Past Surgical History:   Procedure Laterality Date     CARDIAC STRESS TST,COMPLETE       HC KNEE SCOPE,MED/LAT MENISECTOMY  11    left, with partial medial menisectomy ONLY     Family History   Problem Relation Age of Onset     Prostate Cancer Father      C.A.D. 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     Occupation: Drives school Bus   Social Needs     Financial resource strain: Not on file     Food insecurity:     Worry: Not on file     Inability: Not on file     Transportation needs:     Medical: Not on file     Non-medical: Not on file   Tobacco Use     Smoking status: Former Smoker     Packs/day: 1.00     Years: 35.00     Pack years: 35.00     Types: Cigarettes, Cigars, Pipe     Start date: 1963     Last attempt to quit: 10/13/1998     Years since quittin.6     Smokeless tobacco: Never Used   Substance and Sexual Activity     Alcohol use: Yes     Comment: about a beer a week      Drug use: No     Sexual activity: Not Currently     Partners: Female     Birth control/protection: None   Lifestyle     Physical activity:     Days per week: Not on file     Minutes per session: Not on file     Stress: Not on file  "  Relationships     Social connections:     Talks on phone: Not on file     Gets together: Not on file     Attends Bahai service: Not on file     Active member of club or organization: Not on file     Attends meetings of clubs or organizations: Not on file     Relationship status: Not on file     Intimate partner violence:     Fear of current or ex partner: Not on file     Emotionally abused: Not on file     Physically abused: Not on file     Forced sexual activity: Not on file   Other Topics Concern     Parent/sibling w/ CABG, MI or angioplasty before 65F 55M? No   Social History Narrative     Not on file       Objective  /80   Ht 1.778 m (5' 10\")   Wt 87.1 kg (192 lb)   BMI 27.55 kg/m       GENERAL APPEARANCE: healthy, alert and no distress   GAIT: NORMAL  SKIN: no suspicious lesions or rashes  NEURO: Normal strength and tone, mentation intact and speech normal  PSYCH:  mentation appears normal and affect normal/bright  HEENT: no scleral icterus  CV: no extremity edema  RESP: nonlabored breathing    Left Knee exam    ROM:        No significant change in symptoms with active range of motion    Skin:       no visible deformities       well perfused       capillary refill brisk    Tender:        medial joint line    Remainder of exam deferred in lieu of discussion of options for knee OA      Radiology  Visualized radiographs of left knee obtained today, and reviewed the images with the patient.  Impression: Bilateral PA, bilateral sunrise, and left lateral views of the knees demonstrate moderate left medial compartment narrowing.  This overall appears similar to prior images from 1/2/2014.  There has been some progression of patellofemoral degenerative change bilaterally, when compared to prior images.  There is also a left knee joint effusion present.  No acute osseous abnormality identified.      Assessment:  1. Chronic pain of left knee    2. Primary osteoarthritis of left knee        Plan:  Discussed " the assessment with the patient.    Discussed nature of degenerative arthrosis of the knee. Discussed symptom treatment with over-the-counter medications, ice or heat, topical treatments, and rest if needed. Discussed use of compression or bracing for comfort. Discussed potential benefits of rehabilitation, to maintain or improve function at the knee. Discussed benefits of exercise and weight loss (if applicable) to reduce pressure at the knee. Discussed injection therapy. Also briefly discussed future consideration of referral to orthopedic surgery for further evaluation and discussion of additional treatment options.    For now, he appreciated the discussion and prefers to consider all of his options.  Additionally, see patient instructions.  He may contact the clinic if desiring to pursue any 1 of the options discussed today.  Otherwise, can manage as he has been doing currently, with over-the-counter medication, activity modification, topical treatments.  If considering injection in the future, likely suggest imaging guidance given his past experience with injections.    Follow up: Is as needed.  Questions answered. The patient indicates understanding of these issues and agrees with the plan.    Iban Brcue, DO, CAQ    CC: Frannie Talavera        Patient Instructions   Garrett to follow up with Primary Care provider regarding elevated blood pressure.    Symptom treatment: icing, heat, over the counter medication  Activity modification: as needed for pain  Imaging: x-rays reviewed  Support: Sleeve for compression, bracing; discussed potential of an  brace  Exercise: Benefits of remaining active discussed  Medication: Discussed consideration of anti-inflammatory  Injection therapy: Discussed steroid injections, Visco supplement injections, Biologics including PRP and stem cells, and prolotherapy; and he could be done with imaging guidance if considered  Ultimate option is to see orthopedic  surgery.    Contact clinic if any questions or concerns.          Note: Time spent in one-on-one evaluation and discussion with patient regarding nature of problem, course, prior treatments, and therapeutic options, along with review of medical record, at least 50% of which was spent in counseling and coordination of care: over 30 minutes.          Disclaimer: This note consists of symbols derived from keyboarding, dictation and/or voice recognition software. As a result, there may be errors in the script that have gone undetected. Please consider this when interpreting information found in this chart.        Again, thank you for allowing me to participate in the care of your patient.        Sincerely,        Iban Bruce, DO

## 2019-06-17 ENCOUNTER — TELEPHONE (OUTPATIENT)
Facility: CLINIC | Age: 74
End: 2019-06-17

## 2019-06-17 ENCOUNTER — OFFICE VISIT (OUTPATIENT)
Dept: SURGERY | Facility: CLINIC | Age: 74
End: 2019-06-17
Payer: COMMERCIAL

## 2019-06-17 VITALS — BODY MASS INDEX: 27.41 KG/M2 | HEART RATE: 77 BPM | WEIGHT: 191 LBS | TEMPERATURE: 97.1 F

## 2019-06-17 DIAGNOSIS — K40.90 INGUINAL HERNIA OF RIGHT SIDE WITHOUT OBSTRUCTION OR GANGRENE: Primary | ICD-10-CM

## 2019-06-17 PROCEDURE — 99214 OFFICE O/P EST MOD 30 MIN: CPT | Performed by: SURGERY

## 2019-06-17 NOTE — LETTER
6/17/2019         RE: Amilcar Del Rio  [address]  [facility] [address]        Dear Colleague,    Thank you for referring your patient, Amilcar Del Rio, to the [facility]. Please see a copy of my visit note below.    Frannie Talavera  [address] [facility] [address]     AMILCAR DEL RIO     Patient seen in consultation for abdominal pain by Frannie Talavera        I had the pleasure of seeing Amilcar Del Rio in my office on 9/10/2018 for evaluation.     CC:        Chief Complaint   Patient presents with     Abdominal Pain       Recurring abdominal pain.  Frquency of pain has increased in the 2 months      Today diagnosis (R10.30) Lower abdominal pain  Comment:  Plan: CT Abdomen Pelvis w Contrast             HPI: 72-year-old  male is referred to clinic for generalized abdominal pain.  Patient states that the pain has been going on for several months but has been increasing in frequency over the last 2.  Patient describes the pain as sharp 6-8 out of 10, intermittent abdominal pain that is primarily in bilateral lower quadrants with radiation up to the umbilicus.  Patient denies any pain supraumbilically or epigastrically.  Nothing seems to make it better.  Most commonly noted when going from sitting to standing position.  Patient describes a sensation of razor blades in both groins.  Denies any diarrhea constipation or blood in stool.  Patient has been seen and evaluated by GI doctor for the symptoms EGD and colonoscopy were performed which showed no obvious source of note he does have diverticulosis long-standing history of reflux and hiatal hernia.     PAST MEDICAL HISTORY:  Past Medical History        Past Medical History:   Diagnosis Date     Kidney stone 11/10/2011     Diagnosed by ultrasound 10/24/11 3-4 mm      MEDIAL MENISCUS TEAR - left 10/24/2011            PAST SURGICAL HISTORY:  Past Surgical History         Past Surgical History:   Procedure  Laterality Date     CARDIAC STRESS TST,COMPLETE   2006     HC KNEE SCOPE,MED/LAT MENISECTOMY   11/11/11     left, with partial medial menisectomy ONLY            MEDICATIONS:     Current Outpatient Prescriptions:      Ascorbic Acid (VITAMIN C CR PO), Take  by mouth., Disp: , Rfl:      aspirin 81 MG tablet, Take 1 tablet by mouth At Bedtime , Disp: , Rfl:      EPINEPHrine (EPIPEN 2-NETO) 0.3 MG/0.3ML injection, Inject 0.3 mLs into the muscle once as needed for anaphylaxis for 1 dose., Disp: 2 each, Rfl: 3     Multiple Minerals-Vitamins (CALCIUM-MAGNESIUM-ZINC-D3) TABS, Take 1 tablet by mouth 2 times daily, Disp: , Rfl:      omeprazole (PRILOSEC) 20 MG CR capsule, Take 1 capsule (20 mg) by mouth 2 times daily (Patient taking differently: Take 20 mg by mouth daily ), Disp: 180 capsule, Rfl: 3     order for DME, CPAP 9 cm H20, Disp: 1 Units, Rfl: 0     RESVERATROL PO, Take by mouth daily, Disp: , Rfl:      ASPIRIN PO, Take 325 mg by mouth daily 2 tablets daily for arthritis, Disp: , Rfl:      ALLERGIES:       Allergies   Allergen Reactions     Septra [Sulfamethoxazole W-Trimethoprim]           SOCIAL HISTORY:  Social History   Social History            Social History     Marital status:        Spouse name: N/A     Number of children: N/A     Years of education: N/A            Occupational History     Business owner Retired       manufactured fluid guages     Drives school Bus                Social History Main Topics     Smoking status: Former Smoker       Packs/day: 1.00       Years: 35.00       Types: Cigarettes, Cigars, Pipe       Start date: 6/6/1963       Quit date: 10/13/1998     Smokeless tobacco: Never Used     Alcohol use Yes          Comment: about a beer a week      Drug use: No     Sexual activity: Not Currently       Partners: Female       Birth control/ protection: None           Other Topics Concern     Parent/Sibling W/ Cabg, Mi Or Angioplasty Before 65f 55m? No      Social History Narrative             FAMILY HISTORY:   Family History         Family History   Problem Relation Age of Onset     Prostate Cancer Father       C.A.D. Father       Diabetes Paternal Grandfather       Hypertension No family hx of       Cancer No family hx of           No significant family history of cardiovascular disease otherwise.     REVIEW OF SYSTEMS:  CONSTITUTIONAL:NEGATIVE for fever, chills, change in weight  INTEGUMENTARY/SKIN: NEGATIVE for worrisome rashes, moles or lesions  EYES: NEGATIVE for vision changes or irritation  ENT/MOUTH: NEGATIVE for ear, mouth and throat problems  RESP:NEGATIVE for significant cough or SOB  BREAST: NEGATIVE for masses, tenderness or discharge  CV: NEGATIVE for chest pain, palpitations or peripheral edema  GI: POSITIVE for abdominal pain RLQ, LLQ and periumbilical and NEGATIVE for constipation, diarrhea, dyspepsia, dysphagia, gas or bloating, heartburn or reflux, hematemesis, hematochezia and hemorrhoids  negative, dysuria and hematuria  MUSCULOSKELETAL: NEGATIVE for significant arthralgias or myalgia  NEURO: NEGATIVE for weakness, dizziness or paresthesias  ENDOCRINE: NEGATIVE for temperature intolerance, skin/hair changes  HEME/ALLERGY/IMMUNE: NEGATIVE for bleeding problems  PSYCHIATRIC: NEGATIVE for changes in mood or affect           PHYSICAL EXAMINATION:  Vitals: /76  Pulse 82  Temp 98.2  F (36.8  C) (Oral)  Wt 85.7 kg (189 lb)  SpO2 98%  BMI 27.12 kg/m2  BMI= Body mass index is 27.12 kg/(m^2).  GENERAL/PSYCH: Patient is awake, A&Ox3, NAD, stable mood, good judgement and insight.  HEAD: Atraumatic, Normocephalic  EYES: Anicteric. Pupils equal and reactive  NECK: No masses/LNs. Trachea midline.  CHEST: Symmetrical, Respiratory effort WNL, no stridor.  HEART: Regular Rate and Rhythm.   ABDOMEN: Soft, non-tender, non-distended.  Diastasis recti noted.  Palpable, reducible right inguinal hernia noted w/out evidence of strangulation.  No R/R/G appreciated.  No palpable abdominal  masses.   LOWER EXTREMITIES: No gross deformity. Pulses palpable and equal bilaterally.  SKIN: No visible generalized rash.        LABS:     No visits with results within 3 Month(s) from this visit.  Latest known visit with results is:            Orders Only on 06/09/2017   Component Date Value      Specimen Description 06/09/2017 Feces      H Pylori Antigen 06/09/2017                      Value:Negative for Helicobacter pylori antigen by enzyme immunoassay. A negative   result indicates the absence of H. pylori antigen or that the level of antigen   is below the level of detection.        Micro Report Status 06/09/2017 FINAL 06/12/2017          STUDIES: None     IMPRESSION and PLAN:    Right inguinal hernia  Plan for laparoscopic Right Inguinal Hernia repair with mesh; possible open; possible bilateral    Detailed discussion was had with patient of risk and benefits of procedure to include, but not limited to; bleeding, infection, chronic pain, ischemic orchitis, recurrence, need for mesh removal, and damage to vas deferens.     Discussion regarding post op requirement for 6 weeks of not lifting >25lbs was had.       Again, thank you for allowing me to participate in the care of your patient.        Sincerely,        Nigel Poon,

## 2019-06-17 NOTE — PROGRESS NOTES
Frannie Talavera  [address] [facility] [address]     AMILCAR DEL RIO     Patient seen in consultation for abdominal pain by Frannie Talavera        I had the pleasure of seeing Amilcar Del Rio in my office on 9/10/2018 for evaluation.     CC:        Chief Complaint   Patient presents with     Abdominal Pain       Recurring abdominal pain.  Frquency of pain has increased in the 2 months      Today diagnosis (R10.30) Lower abdominal pain  Comment:  Plan: CT Abdomen Pelvis w Contrast             HPI: 72-year-old  male is referred to clinic for generalized abdominal pain.  Patient states that the pain has been going on for several months but has been increasing in frequency over the last 2.  Patient describes the pain as sharp 6-8 out of 10, intermittent abdominal pain that is primarily in bilateral lower quadrants with radiation up to the umbilicus.  Patient denies any pain supraumbilically or epigastrically.  Nothing seems to make it better.  Most commonly noted when going from sitting to standing position.  Patient describes a sensation of razor blades in both groins.  Denies any diarrhea constipation or blood in stool.  Patient has been seen and evaluated by GI doctor for the symptoms EGD and colonoscopy were performed which showed no obvious source of note he does have diverticulosis long-standing history of reflux and hiatal hernia.     PAST MEDICAL HISTORY:  Past Medical History        Past Medical History:   Diagnosis Date     Kidney stone 11/10/2011     Diagnosed by ultrasound 10/24/11 3-4 mm      MEDIAL MENISCUS TEAR - left 10/24/2011            PAST SURGICAL HISTORY:  Past Surgical History         Past Surgical History:   Procedure Laterality Date     CARDIAC STRESS TST,COMPLETE   2006     HC KNEE SCOPE,MED/LAT MENISECTOMY   11/11/11     left, with partial medial menisectomy ONLY            MEDICATIONS:     Current Outpatient Prescriptions:      Ascorbic Acid (VITAMIN C CR  PO), Take  by mouth., Disp: , Rfl:      aspirin 81 MG tablet, Take 1 tablet by mouth At Bedtime , Disp: , Rfl:      EPINEPHrine (EPIPEN 2-NETO) 0.3 MG/0.3ML injection, Inject 0.3 mLs into the muscle once as needed for anaphylaxis for 1 dose., Disp: 2 each, Rfl: 3     Multiple Minerals-Vitamins (CALCIUM-MAGNESIUM-ZINC-D3) TABS, Take 1 tablet by mouth 2 times daily, Disp: , Rfl:      omeprazole (PRILOSEC) 20 MG CR capsule, Take 1 capsule (20 mg) by mouth 2 times daily (Patient taking differently: Take 20 mg by mouth daily ), Disp: 180 capsule, Rfl: 3     order for DME, CPAP 9 cm H20, Disp: 1 Units, Rfl: 0     RESVERATROL PO, Take by mouth daily, Disp: , Rfl:      ASPIRIN PO, Take 325 mg by mouth daily 2 tablets daily for arthritis, Disp: , Rfl:      ALLERGIES:       Allergies   Allergen Reactions     Septra [Sulfamethoxazole W-Trimethoprim]           SOCIAL HISTORY:  Social History   Social History            Social History     Marital status:        Spouse name: N/A     Number of children: N/A     Years of education: N/A            Occupational History     Business owner Retired       manufactured fluid guages     Drives school Bus                Social History Main Topics     Smoking status: Former Smoker       Packs/day: 1.00       Years: 35.00       Types: Cigarettes, Cigars, Pipe       Start date: 6/6/1963       Quit date: 10/13/1998     Smokeless tobacco: Never Used     Alcohol use Yes          Comment: about a beer a week      Drug use: No     Sexual activity: Not Currently       Partners: Female       Birth control/ protection: None           Other Topics Concern     Parent/Sibling W/ Cabg, Mi Or Angioplasty Before 65f 55m? No      Social History Narrative            FAMILY HISTORY:   Family History         Family History   Problem Relation Age of Onset     Prostate Cancer Father       C.A.D. Father       Diabetes Paternal Grandfather       Hypertension No family hx of       Cancer No family hx of            No significant family history of cardiovascular disease otherwise.     REVIEW OF SYSTEMS:  CONSTITUTIONAL:NEGATIVE for fever, chills, change in weight  INTEGUMENTARY/SKIN: NEGATIVE for worrisome rashes, moles or lesions  EYES: NEGATIVE for vision changes or irritation  ENT/MOUTH: NEGATIVE for ear, mouth and throat problems  RESP:NEGATIVE for significant cough or SOB  BREAST: NEGATIVE for masses, tenderness or discharge  CV: NEGATIVE for chest pain, palpitations or peripheral edema  GI: POSITIVE for abdominal pain RLQ, LLQ and periumbilical and NEGATIVE for constipation, diarrhea, dyspepsia, dysphagia, gas or bloating, heartburn or reflux, hematemesis, hematochezia and hemorrhoids  negative, dysuria and hematuria  MUSCULOSKELETAL: NEGATIVE for significant arthralgias or myalgia  NEURO: NEGATIVE for weakness, dizziness or paresthesias  ENDOCRINE: NEGATIVE for temperature intolerance, skin/hair changes  HEME/ALLERGY/IMMUNE: NEGATIVE for bleeding problems  PSYCHIATRIC: NEGATIVE for changes in mood or affect           PHYSICAL EXAMINATION:  Vitals: /76  Pulse 82  Temp 98.2  F (36.8  C) (Oral)  Wt 85.7 kg (189 lb)  SpO2 98%  BMI 27.12 kg/m2  BMI= Body mass index is 27.12 kg/(m^2).  GENERAL/PSYCH: Patient is awake, A&Ox3, NAD, stable mood, good judgement and insight.  HEAD: Atraumatic, Normocephalic  EYES: Anicteric. Pupils equal and reactive  NECK: No masses/LNs. Trachea midline.  CHEST: Symmetrical, Respiratory effort WNL, no stridor.  HEART: Regular Rate and Rhythm.   ABDOMEN: Soft, non-tender, non-distended.  Diastasis recti noted.  Palpable, reducible right inguinal hernia noted w/out evidence of strangulation.  No R/R/G appreciated.  No palpable abdominal masses.   LOWER EXTREMITIES: No gross deformity. Pulses palpable and equal bilaterally.  SKIN: No visible generalized rash.        LABS:     No visits with results within 3 Month(s) from this visit.  Latest known visit with results is:             Orders Only on 06/09/2017   Component Date Value      Specimen Description 06/09/2017 Feces      H Pylori Antigen 06/09/2017                      Value:Negative for Helicobacter pylori antigen by enzyme immunoassay. A negative   result indicates the absence of H. pylori antigen or that the level of antigen   is below the level of detection.        Micro Report Status 06/09/2017 FINAL 06/12/2017          STUDIES: None     IMPRESSION and PLAN:    Right inguinal hernia  Plan for laparoscopic Right Inguinal Hernia repair with mesh; possible open; possible bilateral    Detailed discussion was had with patient of risk and benefits of procedure to include, but not limited to; bleeding, infection, chronic pain, ischemic orchitis, recurrence, need for mesh removal, and damage to vas deferens.     Discussion regarding post op requirement for 6 weeks of not lifting >25lbs was had.

## 2019-06-20 ENCOUNTER — OFFICE VISIT (OUTPATIENT)
Dept: FAMILY MEDICINE | Facility: CLINIC | Age: 74
End: 2019-06-20
Payer: COMMERCIAL

## 2019-06-20 VITALS
DIASTOLIC BLOOD PRESSURE: 78 MMHG | HEIGHT: 70 IN | WEIGHT: 194 LBS | HEART RATE: 81 BPM | SYSTOLIC BLOOD PRESSURE: 140 MMHG | TEMPERATURE: 98.5 F | BODY MASS INDEX: 27.77 KG/M2 | OXYGEN SATURATION: 97 %

## 2019-06-20 DIAGNOSIS — K40.90 RIGHT INGUINAL HERNIA: ICD-10-CM

## 2019-06-20 DIAGNOSIS — I10 HYPERTENSION GOAL BP (BLOOD PRESSURE) < 140/90: ICD-10-CM

## 2019-06-20 DIAGNOSIS — Z01.818 PREOP GENERAL PHYSICAL EXAM: Primary | ICD-10-CM

## 2019-06-20 DIAGNOSIS — G47.33 OSA (OBSTRUCTIVE SLEEP APNEA): Chronic | ICD-10-CM

## 2019-06-20 PROCEDURE — 99215 OFFICE O/P EST HI 40 MIN: CPT | Performed by: NURSE PRACTITIONER

## 2019-06-20 PROCEDURE — 93000 ELECTROCARDIOGRAM COMPLETE: CPT | Performed by: NURSE PRACTITIONER

## 2019-06-20 ASSESSMENT — MIFFLIN-ST. JEOR: SCORE: 1631.23

## 2019-06-20 ASSESSMENT — PAIN SCALES - GENERAL: PAINLEVEL: NO PAIN (0)

## 2019-06-20 NOTE — PROGRESS NOTES
[address] [id number] [phone]  Dept: [phone]    PRE-OP EVALUATION:  Today's date: 2019    Garrett Del Rio (: 1945) presents for pre-operative evaluation assessment as requested by Dr. AFSHIN Diaz-Deatn  He requires evaluation and anesthesia risk assessment prior to undergoing surgery/procedure for treatment of inguinal hernia repair  .    Proposed Surgery/ Procedure:hernia repair   Date of Surgery/ Procedure: 2019  Time of Surgery/ Procedure: 8:30am   Hospital/Surgical Facility: [facility], not sure of address.     Primary Physician: Frannie Talavera  Type of Anesthesia Anticipated: to be determined    Patient has a Health Care Directive or Living Will:  YES      1. NO - Do you have a history of heart attack, stroke, stent, bypass or surgery on an artery in the head, neck, heart or legs?  2. YES - DO YOU EVER HAVE ANY PAIN OR DISCOMFORT IN YOUR CHEST? Sometimes   3. NO - Do you have a history of  Heart Failure?  4. NO - Are you troubled by shortness of breath when: walking on the level, up a slight hill or at night?  5. NO - Do you currently have a cold, bronchitis or other respiratory infection?  6. NO - Do you have a cough, shortness of breath or wheezing?  7. NO - Do you sometimes get pains in the calves of your legs when you walk?  8. NO - Do you or anyone in your family have previous history of blood clots?  9. YES - DO YOU OR DOES ANYONE IN YOUR FAMILY HAVE A SERIOUS BLEEDING PROBLEM SUCH AS PROLONGED BLEEDING FOLLOWING SURGERIES OR CUTS? Pt's  Mother, low platelets  10. NO - Have you ever had problems with anemia or been told to take iron pills?  11. NO - Have you had any abnormal blood loss such as black, tarry or bloody stools, or abnormal vaginal bleeding?  12. NO - Have you ever had a blood transfusion?  13. NO - Have you or any of your relatives ever had problems with anesthesia?  14. YES - DO YOU HAVE  SLEEP APNEA, EXCESSIVE SNORING OR DAYTIME DROWSINESS? Sleep apnea   15. NO - Do you have any prosthetic heart valves?  16. NO - Do you have prosthetic joints?  17. NO - Is there any chance that you may be pregnant?      HPI:     HPI related to upcoming procedure: Right inguinal hernia surgery scheduled      See problem list for active medical problems.  Problems all longstanding and stable, except as noted/documented.  See ROS for pertinent symptoms related to these conditions.      MEDICAL HISTORY:     Patient Active Problem List    Diagnosis Date Noted     Prostate cancer screening 10/29/2011     Priority: High     Provided by urologist Dr. Addy Juan [facility]     [phone]       Lower abdominal pain 09/10/2018     Priority: Medium     Nonalcoholic fatty liver disease 11/14/2017     Priority: Medium     GI feels fatty liver is the cause of elevated liver enzymes.  Seen by [facility] 11/2017       Psychophysiologic insomnia 10/04/2017     Priority: Medium     Diverticulosis of large intestine 10/03/2017     Priority: Medium     Hiatal hernia 06/12/2013     Priority: Medium     Colon polyp 06/12/2013     Priority: Medium     Advanced directives, counseling/discussion 08/01/2012     Priority: Medium     Discussed advance care planning with patient; information given to patient to review. 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 Chest x-rays negative.        ADOLFO (obstructive sleep apnea)- moderate (AHI 21) 11/09/2011     Priority: Medium     Sleep study 03/18/2008 [facility] (177#)  apnea/hypopnea index of 21.6. This was worse in REM where the index increased to 47.3. Overall respiratory disturbance index is 32.8. Oxygen saturations were 91% or greater. There were 10.1 periodic limb movements of sleep with minimal associated arousals. Supine REM was observed at 8 and 9 cm of water pressure. This latter may have conferred a slight advantage, although  both pressures markedly improved sleep-disordered breathing. There were no periodic limb movements of sleep noted on nasal CPAP.         OA (OSTEOARTHRITIS) OF KNEE - left 10/24/2011     Priority: Medium     Hypertension goal BP (blood pressure) < 140/90 02/09/2011     Priority: Medium     CARDIOVASCULAR SCREENING; LDL GOAL LESS THAN 160 05/09/2010     Priority: Medium     GERD (gastroesophageal reflux disease)      Priority: Medium     egd 12/90 8/2017       Esophageal spasms      Priority: Medium     has had uses NTG for relief          Past Medical History:   Diagnosis Date     Kidney stone 11/10/2011    Diagnosed by ultrasound 10/24/11 3-4 mm      Lung nodule      MEDIAL MENISCUS TEAR - left 10/24/2011     Past Surgical History:   Procedure Laterality Date     CARDIAC STRESS TST,COMPLETE  2006     HC KNEE SCOPE,MED/LAT MENISECTOMY  11/11/11    left, with partial medial menisectomy ONLY     Current Outpatient Medications   Medication Sig Dispense Refill     Ascorbic Acid (VITAMIN C CR PO) Take  by mouth.       aspirin 81 MG tablet Take 1 tablet by mouth At Bedtime        ASPIRIN PO Take 325 mg by mouth daily 2 tablets daily for arthritis       EPINEPHrine (EPIPEN 2-NETO) 0.3 MG/0.3ML injection Inject 0.3 mLs into the muscle once as needed for anaphylaxis for 1 dose. 2 each 3     Multiple Minerals-Vitamins (CALCIUM-MAGNESIUM-ZINC-D3) TABS Take 1 tablet by mouth 2 times daily       Multiple Vitamin (MULTI-VITAMINS) TABS Take 1 tablet by mouth daily       omeprazole (PRILOSEC) 20 MG CR capsule Take 1 capsule (20 mg) by mouth daily 90 capsule 3     order for DME CPAP 9 cm H20 1 Units 0     RESVERATROL PO Take 300 mg by mouth daily        OTC products: None, except as noted above    Allergies   Allergen Reactions     Septra [Sulfamethoxazole W-Trimethoprim]      Urinary symptoms      Latex Allergy: NO    Social History     Tobacco Use     Smoking status: Former Smoker     Packs/day: 1.00     Years: 35.00     Pack  "years: 35.00     Types: Cigarettes, Cigars, Pipe     Start date: 1963     Last attempt to quit: 10/13/1998     Years since quittin.6     Smokeless tobacco: Never Used   Substance Use Topics     Alcohol use: Yes     Comment: about a beer a week      History   Drug Use No       REVIEW OF SYSTEMS:   Constitutional, neuro, ENT, endocrine, pulmonary, cardiac, gastrointestinal, genitourinary, musculoskeletal, integument and psychiatric systems are negative, except as otherwise noted.    EXAM:   /78   Pulse 81   Temp 98.5  F (36.9  C)   Ht 1.778 m (5' 10\")   Wt 88 kg (194 lb)   SpO2 97%   BMI 27.84 kg/m      GENERAL APPEARANCE: healthy, alert and no distress     EYES: EOMI,  PERRL     HENT: ear canals and TM's normal and nose and mouth without ulcers or lesions     NECK: no adenopathy, no asymmetry, masses, or scars and thyroid normal to palpation     RESP: lungs clear to auscultation - no rales, rhonchi or wheezes     CV: regular rates and rhythm, normal S1 S2, no S3 or S4 and no murmur, click or rub     SKIN: no suspicious lesions or rashes     NEURO: Normal strength and tone, sensory exam grossly normal, mentation intact and speech normal     PSYCH: mentation appears normal. and affect normal/bright     LYMPHATICS: No cervical adenopathy    DIAGNOSTICS:   EKG: Not indicated due to non-vascular surgery and last ekg on 10/18/18 (within 30 days for CAD history or last year for cardiac risk factors)    Recent Labs   Lab Test 18  0844 10/29/18  0845 17  1330  16  1357 10/16/15  0808  14  2318   HGB  --   --  15.7  --  15.9  --    < > 14.7   PLT  --   --  173  --  161  --   --  141*   INR  --   --   --   --   --   --   --  1.02   NA  --  138 139   < > 139 139   < > 138   POTASSIUM  --  4.3 4.6   < > 4.6 4.3   < > 3.8   CR  --  0.97 0.94   < > 1.05 1.02  --  0.96   A1C 5.4  --   --   --   --  5.7  --   --     < > = values in this interval not displayed.        IMPRESSION:   Reason " for surgery/procedure: right inguinal hernia repair  Diagnosis/reason for consult: right inguinal hernia    The proposed surgical procedure is considered INTERMEDIATE risk.    REVISED CARDIAC RISK INDEX  The patient has the following serious cardiovascular risks for perioperative complications such as (MI, PE, VFib and 3  AV Block):  No serious cardiac risks  INTERPRETATION: 0 risks: Class I (very low risk - 0.4% complication rate)    The patient has the following additional risks for perioperative complications:  Sleep apnea      ICD-10-CM    1. Preop general physical exam Z01.818 EKG 12-lead complete w/read - Clinics   2. Right inguinal hernia K40.90    3. Hypertension goal BP (blood pressure) < 140/90 I10    4. ADOLFO (obstructive sleep apnea)- moderate (AHI 21) G47.33        RECOMMENDATIONS:       Obstructive Sleep Apnea (or suspected sleep apnea)  Patient is to bring their home CPAP with them on the day of surgery      --Patient is to take all scheduled medications on the day of surgery EXCEPT for modifications listed below.  Stop aspirin, vitamin E 7 days prior to surgery.    APPROVAL GIVEN to proceed with proposed procedure, without further diagnostic evaluation       Signed Electronically by: Arleth Bonilla NP    Copy of this evaluation report is provided to requesting physician.    [facility] Preop Guidelines    Revised Cardiac Risk Index

## 2019-06-25 ENCOUNTER — TELEPHONE (OUTPATIENT)
Dept: FAMILY MEDICINE | Facility: CLINIC | Age: 74
End: 2019-06-25

## 2019-06-25 NOTE — TELEPHONE ENCOUNTER
Reason for Call:  Other     Detailed comments: Susan with [facility] calling to clarify if patient had an EKG done at pre op on 06/20 with Dr Bonilla. TC sees that it was ordered but is unable to tell if it was completed. If it was completed, please fax to [phone].    Phone Number Patient can be reached at: Other phone number: [phone]    Best Time: any    Can we leave a detailed message on this number? YES    Call taken on 6/25/2019 at 9:39 AM by Emily Alfonso

## 2019-06-25 NOTE — TELEPHONE ENCOUNTER
Left msg for Susan letting her know patient did not have EKG done on 06/20 and asking her to call back TC line [phone].    Thank you,  Leonarda EGAN    [facility]

## 2019-06-26 NOTE — TELEPHONE ENCOUNTER
Susan did not call back with additional questions, closing encounter.    Thank you,  Leonarda EGAN    [facility]

## 2019-06-28 ENCOUNTER — PRE VISIT (OUTPATIENT)
Dept: SLEEP MEDICINE | Facility: CLINIC | Age: 74
End: 2019-06-28

## 2019-06-28 DIAGNOSIS — G47.33 OSA (OBSTRUCTIVE SLEEP APNEA): ICD-10-CM

## 2019-06-28 DIAGNOSIS — F51.04 PSYCHOPHYSIOLOGIC INSOMNIA: ICD-10-CM

## 2019-06-28 NOTE — TELEPHONE ENCOUNTER
"  1.  Reason for the visit:  Consult to establish care for cpap   2.  Referring provider and clinic name:  Dr. Ilan Morrow [facility]  3.  Previous Sleep Doctor or Pulmonlogist (clinic name)?  [facility]  4.  Records, Procedures, Imaging, and Labs (see below)  Records in Epic        All NOTES from previous office visits that pertain to why they are being seen in the Sleep Center    Previous Sleep Studies, Chest CT, Echos and reports that pertain to why they are seeing Sleep Center    All Sleep records that have been done in the last 2 years that pertain to why they are seeing Sleep Center            Are they being seen for continuation of care for Cpap/Bipap/Avap/Trilogy/Dental Device? Cpap    If yes to above Who and Where was Device issued/currently getting supplies from?     Are you currently on \"Supplemental Oxygen\" during the day or night?                                                                                                                                                         Please remind pt to bring Cpap machine and ask to arrive 15 minutes early to appointment due traffic and congestion                                                 5. Pt Sleep Center Packet received Message left asking pt to arrive 30 minutes early to appointment if no packet received.        Yes: \"please make sure that you bring this to your appointment completed, either the doctor will not see you until this completed or you may be asked to reschedule your appointment.\"     No: mail or email to the pt and explain, \"please make sure that you bring this to your appointment completed, either the doctor will not see you until this completed or you may be asked to reschedule your appointment.\"     ~If pt coming early to fill packet out, ask that they come 30 minutes prior to their appointment~     6. Has the pt's medication list been updated and preferred pharmacy added?     7. Has the allergy list been " "reviewed?    \"Thank you for choosing [facility] and we look forward to seeing you at your upcoming appointment\"     "

## 2019-07-01 ENCOUNTER — OFFICE VISIT (OUTPATIENT)
Dept: SLEEP MEDICINE | Facility: CLINIC | Age: 74
End: 2019-07-01
Attending: FAMILY MEDICINE
Payer: COMMERCIAL

## 2019-07-01 VITALS
OXYGEN SATURATION: 95 % | DIASTOLIC BLOOD PRESSURE: 81 MMHG | BODY MASS INDEX: 28.06 KG/M2 | WEIGHT: 196 LBS | RESPIRATION RATE: 16 BRPM | HEART RATE: 84 BPM | SYSTOLIC BLOOD PRESSURE: 151 MMHG | HEIGHT: 70 IN

## 2019-07-01 DIAGNOSIS — G47.33 OSA (OBSTRUCTIVE SLEEP APNEA): Chronic | ICD-10-CM

## 2019-07-01 PROCEDURE — 99215 OFFICE O/P EST HI 40 MIN: CPT | Performed by: INTERNAL MEDICINE

## 2019-07-01 ASSESSMENT — MIFFLIN-ST. JEOR: SCORE: 1640.3

## 2019-07-01 NOTE — PATIENT INSTRUCTIONS
"MY TREATMENT INFORMATION FOR SLEEP APNEA-  Garrett Del Rio    DOCTOR : SHIRIN LARIOS  SLEEP CENTER :      MY CONTACT NUMBER:     Am I having a sleep study at a sleep center?  Make sure you have an appointment for the study before you leave!    Am I having a home sleep study?  Watch this video:  [email or web address]?[email or web address]  Please verify your insurance coverage with your insurance carrier    Frequently asked questions:  1. What is Obstructive Sleep Apnea (ADOLFO)? ADOLFO is the most common type of sleep apnea. Apnea means, \"without breath.\"  Apnea is most often caused by narrowing or collapse of the upper airway as muscles relax during sleep.   Almost everyone has occasional apneas. Most people with sleep apnea have had brief interruptions at night frequently for many years.  The severity of sleep apnea is related to how frequent and severe the events are.   2. What are the consequences of ADOLFO? Symptoms include: feeling sleepy during the day, snoring loudly, gasping or stopping of breathing, trouble sleeping, and occasionally morning headaches or heartburn at night.  Sleepiness can be serious and even increase the risk of falling asleep while driving. Other health consequences may include development of high blood pressure and other cardiovascular disease in persons who are susceptible. Untreated ADOLFO  can contribute to heart disease, stroke and diabetes.   3. What are the treatment options? In most situations, sleep apnea is a lifelong disease that must be managed with daily therapy. Medications are not effective for sleep apnea and surgery is generally not considered until other therapies have been tried. Your treatment is your choice . Continuous Positive Airway (CPAP) works right away and is the therapy that is effective in nearly everyone. An oral device to hold your jaw forward is usually the next most reliable option. Other options include postioning devices (to " keep you off your back), weight loss, and surgery including a tongue pacing device. There is more detail about some of these options below.    Important tips for using CPAP and similar devices   Know your equipment:  CPAP is continuous positive airway pressure that prevents obstructive sleep apnea by keeping the throat from collapsing while you are sleeping. In most cases, the device is  smart  and can slowly self-adjusts if your throat collapses and keeps a record every day of how well you are treated-this information is available to you and your care team.  BPAP is bilevel positive airway pressure that keeps your throat open and also assists each breath with a pressure boost to maintain adequate breathing.  Special kinds of BPAP are used in patients who have inadequate breathing from lung or heart disease. In most cases, the device is  smart  and can slowly self-adjusts to assist breathing. Like CPAP, the device keeps a record of how well you are treated.  Your mask is your connection to the device. You get to choose what feels most comfortable and the staff will help to make sure if fits. Here: are some examples of the different masks that are available:       Key points to remember on your journey with sleep apnea:  1. Sleep study.  PAP devices often need to be adjusted during a sleep study to show that they are effective and adjusted right.  2. Good tips to remember: Try wearing just the mask during a quiet time during the day so your body adapts to wearing it. A humidifier is recommended for comfort in most cases to prevent drying of your nose and throat. Allergy medication from your provider may help you if you are having nasal congestion.  3. Getting settled-in. It takes more than one night for most of us to get used to wearing a mask. Try wearing just the mask during a quiet time during the day so your body adapts to wearing it. A humidifier is recommended for comfort in most cases. Our team will work with  you carefully on the first day and will be in contact within 4 days and again at 2 and 4 weeks for advice and remote device adjustments. Your therapy is evaluated by the device each day.   4. Use it every night. The more you are able to sleep naturally for 7-8 hours, the more likely you will have good sleep and to prevent health risks or symptoms from sleep apnea. Even if you use it 4 hours it helps. Occasionally all of us are unable to use a medical therapy, in sleep apnea, it is not dangerous to miss one night.   5. Communicate. Call our skilled team on the number provided on the first day if your visit for problems that make it difficult to wear the device. Over 2 out of 3 patients can learn to wear the device long-term with help from our team. Remember to call our team or your sleep providers if you are unable to wear the device as we may have other solutions for those who cannot adapt to mask CPAP therapy. It is recommended that you sleep your sleep provider within the first 3 months and yearly after that if you are not having problems.   6. Use it for your health. We encourage use of CPAP masks during daytime quiet periods to allow your face and brain to adapt to the sensation of CPAP so that it will be a more natural sensation to awaken to at night or during naps. This can be very useful during the first few weeks or months of adapting to CPAP though it does not help medically to wear CPAP during wakefulness and  should not be used as a strategy just to meet guidelines.  7. Take care of your equipment. Make sure you clean your mask and tubing using directions every day and that your filter and mask are replaced as recommended or if they are not working.     BESIDES CPAP, WHAT OTHER THERAPIES ARE THERE?    Positioning Device  Positioning devices are generally used when sleep apnea is mild and only occurs on your back.This example shows a pillow that straps around the waist. It may be appropriate for those  whose sleep study shows milder sleep apnea that occurs primarily when lying flat on one's back. Preliminary studies have shown benefit but effectiveness at home may need to be verified by a home sleep test. These devices are generally not covered by medical insurance.  Examples of devices that maintain sleeping on the back to prevent snoring and mild sleep apnea.    Belt type body positioner  Http://YOUnite.apomio/    Electronic reminder  Http://nightshifttherapy.com/  Http://www.SingleHop.apomio.au/      Oral Appliance  What is oral appliance therapy?  An oral appliance device fits on your teeth at night like a retainer used after having braces. The device is made by a specialized dentist and requires several visits over 1-2 months before a manufactured device is made to fit your teeth and is adjusted to prevent your sleep apnea. Once an oral device is working properly, snoring should be improved. A home sleep test may be recommended at that time if to determine whether the sleep apnea is adequately treated.       Some things to remember:  -Oral devices are often, but not always, covered by your medical insurance. Be sure to check with your insurance provider.   -If you are referred for oral therapy, you will be given a list of specialized dentists to consider or you may choose to visit the Web site of the American Academy of Dental Sleep Medicine  -Oral devices are less likely to work if you have severe sleep apnea or are extremely overweight.     More detailed information  An oral appliance is a small acrylic device that fits over the upper and lower teeth  (similar to a retainer or a mouth guard). This device slightly moves jaw forward, which moves the base of the tongue forward, opens the airway, improves breathing for effective treat snoring and obstructive sleep apnea in perhaps 7 out of 10 people .  The best working devices are custom-made by a dental device  after a mold is made of the teeth 1, 2,  3.  When is an oral appliance indicated?  Oral appliance therapy is recommended as a first-line treatment for patients with primary snoring, mild sleep apnea, and for patients with moderate sleep apnea who prefer appliance therapy to use of CPAP4, 5. Severity of sleep apnea is determined by sleep testing and is based on the number of respiratory events per hour of sleep.   How successful is oral appliance therapy?  The success rate of oral appliance therapy in patients with mild sleep apnea is 75-80% while in patients with moderate sleep apnea it is 50-70%. The chance of success in patients with severe sleep apnea is 40-50%. The research also shows that oral appliances have a beneficial effect on the cardiovascular health of ADOLFO patients at the same magnitude as CPAP therapy7.  Oral appliances should be a second-line treatment in cases of severe sleep apnea, but if not completely successful then a combination therapy utilizing CPAP plus oral appliance therapy may be effective. Oral appliances tend to be effective in a broad range of patients although studies show that the patients who have the highest success are females, younger patients, those with milder disease, and less severe obesity. 3, 6.   Finding a dentist that practices dental sleep medicine  Specific training is available through the American Academy of Dental Sleep Medicine for dentists interested in working in the field of sleep. To find a dentist who is educated in the field of sleep and the use of oral appliances, near you, visit the Web site of the American Academy of Dental Sleep Medicine.    References  1. Shay et al. Objectively measured vs self-reported compliance during oral appliance therapy for sleep-disordered breathing. Chest 2013; 144(5): 8640-8502.  2. Kiley et al. Objective measurement of compliance during oral appliance therapy for sleep-disordered breathing. Thorax 2013; 68(1): 91-96.  3. Denzel et al. Mandibular  advancement devices in 620 men and women with ADOLFO and snoring: tolerability and predictors of treatment success. Chest 2004; 125: 1917-6604.  4. Palmer et al. Oral appliances for snoring and ADOLFO: a review. Sleep 2006; 29: 244-262.  5. Aadm et al. Oral appliance treatment for ADOLFO: an update. J Clin Sleep Med 2014; 10(2): 215-227.  6. Dorina et al. Predictors of OSAH treatment outcome. J Dent Res 2007; 86: 4047-7608.      Weight Loss:    Weight loss is a long-term strategy that may improve sleep apnea in some patients.    Weight management is a personal decision and the decision should be based on your interest and the potential benefits.  If you are interested in exploring weight loss strategies, the following discussion covers the impact on weight loss on sleep apnea and the approaches that may be successful.    Being overweight does not necessarily mean you will have health consequences.  Those who have BMI over 35 or over 27 with existing medical conditions carries greater risk.   Weight loss decreases severity of sleep apnea in most people with obesity. For those with mild obesity who have developed snoring with weight gain, even 15-30 pound weight loss can improve and occasionally eliminate sleep apnea.  Structured and life-long dietary and health habits are necessary to lose weight and keep healthier weight levels.     Though there may be significant health benefits from weight loss, long-term weight loss is very difficult to achieve- studies show success with dietary management in less than 10% of people. In addition, substantial weight loss may require years of dietary control and may be difficult if patients have severe obesity. In these cases, surgical management may be considered.  Finally, older individuals who have tolerated obesity without health complications may be less likely to benefit from weight loss strategies.        Your BMI is Body mass index is 28.12 kg/m .  Weight management is a  personal decision.  If you are interested in exploring weight loss strategies, the following discussion covers the approaches that may be successful. Body mass index (BMI) is one way to tell whether you are at a healthy weight, overweight, or obese. It measures your weight in relation to your height.  A BMI of 18.5 to 24.9 is in the healthy range. A person with a BMI of 25 to 29.9 is considered overweight, and someone with a BMI of 30 or greater is considered obese. More than two-thirds of American adults are considered overweight or obese.  Being overweight or obese increases the risk for further weight gain. Excess weight may lead to heart disease and diabetes.  Creating and following plans for healthy eating and physical activity may help you improve your health.  Weight control is part of healthy lifestyle and includes exercise, emotional health, and healthy eating habits. Careful eating habits lifelong are the mainstay of weight control. Though there are significant health benefits from weight loss, long-term weight loss with diet alone may be very difficult to achieve- studies show long-term success with dietary management in less than 10% of people. Attaining a healthy weight may be especially difficult to achieve in those with severe obesity. In some cases, medications, devices and surgical management might be considered.  What can you do?  If you are overweight or obese and are interested in methods for weight loss, you should discuss this with your provider.     Consider reducing daily calorie intake by 500 calories.     Keep a food journal.     Avoiding skipping meals, consider cutting portions instead.    Diet combined with exercise helps maintain muscle while optimizing fat loss. Strength training is particularly important for building and maintaining muscle mass. Exercise helps reduce stress, increase energy, and improves fitness. Increasing exercise without diet control, however, may not burn enough  "calories to loose weight.       Start walking three days a week 10-20 minutes at a time    Work towards walking thirty minutes five days a week     Eventually, increase the speed of your walking for 1-2 minutes at time    In addition, we recommend that you review healthy lifestyles and methods for weight loss available through the National Institutes of Health patient information sites:  http://win.niddk.nih.gov/publications/index.htm    And look into health and wellness programs that may be available through your health insurance provider, employer, local community center, or bola club.    Weight management plan: Patient was referred to their PCP to discuss a diet and exercise plan.     We would like to share some general information about sleep to help us work together to get better sleep for you.     Why do we sleep?   -clear toxins from the brain   -remove unnecessary neural connections that accumulate during the day   -enhance memory and learning   Without adequate sleep, our brain may become impaired in a manner that is similar to being intoxicated.       How much sleep do we need?   -The average adult needs 7-8 hours of sleep while young adolescents need up to 9 hours in order to function at their best.   -Between 12 and 20 years of age our sleep is often delayed up to an hour compared to older or younger people.   Aim for 7-8 sleep and a regular schedule; it may take a week or more to eliminate the effects of chronic insufficient sleep.       When should I sleep?   The timing of sleep is determined genetically for each of us.  Some of us are \"night owls\" and others are \"larks\".   The timing of sleep and the amount of sleep we need changes with our age.   Try to schedule your sleep time to fit your natural sleep schedule when you are not busy with school, work, or travel.       What if my sleep schedule doesn't fit my work schedule?   -If you have no trouble falling asleep or getting up during the work week, " your work schedule is probably well-matched to your natural sleep schedule.   -You may benefit from a sleep  who can help support you to get to the best schedule.       What are some good habits to start with?   -Your bedroom is for sleeping and should be quiet, comfortably cool and dark before you lie down.   -You should not have electronic devices such as phones or computers in your bedroom.   -Your bedtime and awakening time should fit your natural tendency to fall asleep and wake up and should not vary much between weekdays and weekends.   -No caffeine within 6 hours or alcohol within two hours of bedtime  Be careful and regular with your sleep-you will feel better and think better.        Surgery:    Surgery for obstructive sleep apnea is considered generally only when other therapies fail to work. Surgery may be discussed with you if you are having a difficult time tolerating CPAP and or when there is an abnormal structure that requires surgical correction.  Nose and throat surgeries often enlarge the airway to prevent collapse.  Most of these surgeries create pain for 1-2 weeks and up to half of the most common surgeries are not effective throughout life.  You should carefully discuss the benefits and drawbacks to surgery with your sleep provider and surgeon to determine if it is the best solution for you.   More information  Surgery for ADOLFO is directed at areas that are responsible for narrowing or complete obstruction of the airway during sleep.  There are a wide range of procedures available to enlarge and/or stabilize the airway to prevent blockage of breathing in the three major areas where it can occur: the palate, tongue, and nasal regions.  Successful surgical treatment depends on the accurate identification of the factors responsible for obstructive sleep apnea in each person.  A personalized approach is required because there is no single treatment that works well for everyone.  Because of  anatomic variation, consultation with an examination by a sleep surgeon is a critical first step in determining what surgical options are best for each patient.  In some cases, examination during sedation may be recommended in order to guide the selection of procedures.  Patients will be counseled about risks and benefits as well as the typical recovery course after surgery. Surgery is typically not a cure for a person s ADOLFO.  However, surgery will often significantly improve one s ADOLFO severity (termed  success rate ).  Even in the absence of a cure, surgery will decrease the cardiovascular risk associated with OSA7; improve overall quality of life8 (sleepiness, functionality, sleep quality, etc).      Palate Procedures:  Patients with ADOLFO often have narrowing of their airway in the region of their tonsils and uvula.  The goals of palate procedures are to widen the airway in this region as well as to help the tissues resist collapse.  Modern palate procedure techniques focus on tissue conservation and soft tissue rearrangement, rather than tissue removal.  Often the uvula is preserved in this procedure. Residual sleep apnea is common in patient after pharyngoplasty with an average reduction in sleep apnea events of 33%2.      Tongue Procedures:  ExamWhile patients are awake, the muscles that surround the throat are active and keep this region open for breathing. These muscles relax during sleep, allowing the tongue and other structures to collapse and block breathing.  There are several different tongue procedures available.  Selection of a tongue base procedure depends on characteristics seen on physical exam.  Generally, procedures are aimed at removing bulky tissues in this area or preventing the back of the tongue from falling back during sleep.  Success rates for tongue surgery range from 50-62%3.    Hypoglossal Nerve Stimulation:  Hypoglossal nerve stimulation has recently received approval from the [place]  [place] Food and Drug Administration for the treatment of obstructive sleep apnea.  This is based on research showing that the system was safe and effective in treating sleep apnea6.  Results showed that the median AHI score decreased 68%, from 29.3 to 9.0. This therapy uses an implant system that senses breathing patterns and delivers mild stimulation to airway muscles, which keeps the airway open during sleep.  The system consists of three fully implanted components: a small generator (similar in size to a pacemaker), a breathing sensor, and a stimulation lead.  Using a small handheld remote, a patient turns the therapy on before bed and off upon awakening.    Candidates for this device must be greater than 22 years of age, have moderate to severe ADOLFO (AHI between 20-65), BMI less than 32, have tried CPAP/oral appliance without success, and have appropriate upper airway anatomy (determined by a sleep endoscopy performed by Dr. Diamond).    Hypoglossal Nerve Stimulation Pathway:    The sleep surgeon s office will work with the patient through the insurance prior-authorization process (including communications and appeals).    Nasal Procedures:  Nasal obstruction can interfere with nasal breathing during the day and night.  Studies have shown that relief of nasal obstruction can improve the ability of some patients to tolerate positive airway pressure therapy for obstructive sleep apnea1.  Treatment options include medications such as nasal saline, topical corticosteroid and antihistamine sprays, and oral medications such as antihistamines or decongestants. Non-surgical treatments can include external nasal dilators for selected patients. If these are not successful by themselves, surgery can improve the nasal airway either alone or in combination with these other options.      Combination Procedures:  Combination of surgical procedures and other treatments may be recommended, particularly if patients have more than one  area of narrowing or persistent positional disease.  The success rate of combination surgery ranges from 66-80%2,3.    References  1. Pepito HATFIELD. The Role of the Nose in Snoring and Obstructive Sleep Apnoea: An Update.  Eur Arch Otorhinolaryngol. 2011; 268: 1365-73.  2.  Sha SM; Sunitha JA; Anastacio JR; Pallanch JF; Abram MB; Jenni SG; Chantelle GUERRIER. Surgical modifications of the upper airway for obstructive sleep apnea in adults: a systematic review and meta-analysis. SLEEP 2010;33(10):9996-0233. Mahamed PETIT. Hypopharyngeal surgery in obstructive sleep apnea: an evidence-based medicine review.  Arch Otolaryngol Head Neck Surg. 2006 Feb;132(2):206-13.  3. Andi YH1, Malgorzata Y, Gustavo ERIK. The efficacy of anatomically based multilevel surgery for obstructive sleep apnea. Otolaryngol Head Neck Surg. 2003 Oct;129(4):327-35.  4. Mahamed PETIT, Goldberg A. Hypopharyngeal Surgery in Obstructive Sleep Apnea: An Evidence-Based Medicine Review. Arch Otolaryngol Head Neck Surg. 2006 Feb;132(2):206-13.  5. Tyrell PJ et al. Upper-Airway Stimulation for Obstructive Sleep Apnea.  N Engl J Med. 2014 Jan 9;370(2):139-49.  6. Kwame Y et al. Increased Incidence of Cardiovascular Disease in Middle-aged Men with Obstructive Sleep Apnea. Am J Respir Crit Care Med; 2002 166: 159-165  7. Blevins EM et al. Studying Life Effects and Effectiveness of Palatopharyngoplasty (SLEEP) study: Subjective Outcomes of Isolated Uvulopalatopharyngoplasty. Otolaryngol Head Neck Surg. 2011; 144: 623-631.            Your BMI is Body mass index is 28.12 kg/m .  Weight management is a personal decision.  If you are interested in exploring weight loss strategies, the following discussion covers the approaches that may be successful. Body mass index (BMI) is one way to tell whether you are at a healthy weight, overweight, or obese. It measures your weight in relation to your height.  A BMI of 18.5 to 24.9 is in the healthy range. A person with a BMI of 25 to 29.9 is  considered overweight, and someone with a BMI of 30 or greater is considered obese. More than two-thirds of American adults are considered overweight or obese.  Being overweight or obese increases the risk for further weight gain. Excess weight may lead to heart disease and diabetes.  Creating and following plans for healthy eating and physical activity may help you improve your health.  Weight control is part of healthy lifestyle and includes exercise, emotional health, and healthy eating habits. Careful eating habits lifelong are the mainstay of weight control. Though there are significant health benefits from weight loss, long-term weight loss with diet alone may be very difficult to achieve- studies show long-term success with dietary management in less than 10% of people. Attaining a healthy weight may be especially difficult to achieve in those with severe obesity. In some cases, medications, devices and surgical management might be considered.  What can you do?  If you are overweight or obese and are interested in methods for weight loss, you should discuss this with your provider.     Consider reducing daily calorie intake by 500 calories.     Keep a food journal.     Avoiding skipping meals, consider cutting portions instead.    Diet combined with exercise helps maintain muscle while optimizing fat loss. Strength training is particularly important for building and maintaining muscle mass. Exercise helps reduce stress, increase energy, and improves fitness. Increasing exercise without diet control, however, may not burn enough calories to loose weight.       Start walking three days a week 10-20 minutes at a time    Work towards walking thirty minutes five days a week     Eventually, increase the speed of your walking for 1-2 minutes at time    In addition, we recommend that you review healthy lifestyles and methods for weight loss available through the National Institutes of Health patient information  sites:  http://win.niddk.nih.gov/publications/index.htm    And look into health and wellness programs that may be available through your health insurance provider, employer, local community center, or bola club.        Your blood pressure was checked while you were in clinic today.  Please read the guidelines below about what these numbers mean and what you should do about them.  Your systolic blood pressure is the top number.  This is the pressure when the heart is pumping.  Your diastolic blood pressure is the bottom number.  This is the pressure in between beats.  If your systolic blood pressure is less than 120 and your diastolic blood pressure is less than 80, then your blood pressure is normal. There is nothing more that you need to do about it  If your systolic blood pressure is 120-139 or your diastolic blood pressure is 80-89, your blood pressure may be higher than it should be.  You should have your blood pressure re-checked within a year by a primary care provider.  If your systolic blood pressure is 140 or greater or your diastolic blood pressure is 90 or greater, you may have high blood pressure.  High blood pressure is treatable, but if left untreated over time it can put you at risk for heart attack, stroke, or kidney failure.  You should have your blood pressure re-checked by a primary care provider within the next four weeks.

## 2019-07-01 NOTE — PROGRESS NOTES
[facility]   Outpatient Sleep Medicine Consultation  July 1, 2019      Name: Grarett Del Rio MRN# [id number]   Age: 73 year old YOB: 1945     Date of Consultation: July 1, 2019  Consultation is requested by: Frannie Talavera MD  [address] [place] [address]  Primary care provider: Frannie Talavera         Reason for Sleep Consult:     Garrett Del Rio is a 73 year old male who wishes to evaluate alternative therapies for sleep apnea and review his current therapy model.         Assessment and Plan:     Summary Sleep Diagnoses:      Moderate obstructive sleep apnea based on sleep studies 11 years ago with REM related worsening known to be associated with cardiovascular risk and some weight gain previous testing.    Chronically insufficient sleep due to circadian misalignment with some acknowledged symptoms of increased sleep propensity despite normal [place] Sleepiness Scale.    Recent herniorrhaphy with groin pain    Hypertension with systolic hypertension today possibly related to pain-recent home measures have been normal    Gastroesophageal reflux disease    Summary Recommendations:      Patient was issued a prescription for new CPAP device for better estimation of current control of his disease and for additional adjustment of his sleep schedule and timing can be downloaded from this device.  He should return within the next few months for review of his CPAP download for the purposes of providing evidence of effectiveness of this therapy for both medical risk reduction and  safety/commercial license recommendations.    We have given him extensive counseling on the benefits of sleep especially since he reports some modest memory problems that are occurring in the setting of chronic insufficient sleep.  We discussed options of sleep phase advancement in general terms as he was not ready to commit to a program of sleep extension by sleep phase advancement  certainly not interested in changing his job or work timing.    Patient has on his own blood pressure checks but we would recommend follow-up with Dr. Mary if they remain elevated.    Summary Counseling: Counseling regarding potential cardiovascular risks with untreated disease in the setting of moderate to severe sleep apnea and hypertension.  All treatment options were reviewed following the instructions in his after visit summary report.  His sleep apnea is likely somewhat worse and we advised him that further testing is not necessary for ongoing management-Medicare would require polysomnography or home testing done at a different some if he receives his ongoing medical care here.  Daytime sleepiness is likely related insufficient sleep and he was provided with sleep tips as well as potential options for sleep phase advancement to increase his total time if he continues to have an early morning work schedule-he seems in the contemplative phase of accepting behavioral change in this area.             History of Present Illness:     Garrett Del Rio is a 73 year old male with a history of moderate obstructive sleep apnea with REM worsening and high arousal frequency in 2008 [AHI 21.6, RDI 33] with REM worsening who has not had significant improvement in daytime functioning with therapy.  He notes that he did not have excessive daytime sleepiness at his original diagnosis. He is a  who is receiving approval for his license based on self-reported use of the device rather than CPAP download.  He has gained approximately 14 pounds since the 2008 study.    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  As result of getting approximately his hours of sleep on weekdays and 6-1/2 hours sleep on weekends he requires 2 naps per day on a regular  "basis and has a tendency to fall asleep during quiet activities though he cites his [place] Sleepiness Scale at 2 currently.  He also acknowledges caffeinated beverages a day and caffeine within 6 hours of bedtime.        PREVIOUS IN- LAB or HOME SLEEP STUDIES:   Date: February 2008  [facility]ially [facility]/2008 with sleep maintenance difficulties, early awakenings, irritability (ESS 2), snoring, and witnessed apneas.     Sleep study 03/18/2008 [facility] ([id number]#) showed an apnea/hypopnea index of 21.6. This was worse in REM where the index increased to 47.3. Overall respiratory disturbance index is 32.8. Oxygen saturations were 91% or greater. There were 10.1 periodic limb movements of sleep with minimal associated arousals. Supine REM was observed at 8 and 9 cm of water pressure.      SLEEP-WAKE SCHEDULE:   Bedtime 9 PM with 3-minute latency in 4 to 5 ten minute awakenings at night which she ascribes to the use of his \" stupid\" CPAP device hose.  No use of electronic equipment  No leg symptoms or motor restlessness before bedtime though he claims increased sleep movements related to the CPAP use.  Does not snore while wearing CPAP       SCALES       SLEEP APNEA: Stopbang score  NA       INSOMNIA:  Insomnia severity score: 12       SLEEPINESS: [place] sleepiness scale: 2    SLEEP COMPLAINTS:  Cardio-respiratory    Snoring- 0/week  Dyspnea- denies  Morning headaches or confusion-denies  Coexisting Lung disease: denies    Coexisting Heart disease: denies    Does patient have a bed partner: denies  Has bed partner been sleeping separately because of snoring:  denies            RLS Screen: When you try to relax in the evening or sleep at  night, do you ever have unpleasant, restless feelings in your  legs that can be relieved by walking or movement? denies    Periodic limb movement: denies    Sleep Behaviors:    Leg symptoms/movements: denies    Motor restlessness:denies    Night terrors: " denies    Bruxism: This patient has dental implants and uses a mouthguard to protect them.  He is not sure if he is bruxism.    Automatic behaviors: none    Other subjective complaints:    Anxiety or rumination denies    Pain and discomfort at  night: denies    Waking up with heart pounding or racing: denies    GERD or aspiration:denies         Parasomnia:   NREM - denies recurrent persistent confusional arousal, night eating, sleep walking or sleep terrors   REM  - denies dream enactment; injuries                Medications:     Current Outpatient Medications   Medication Sig     Ascorbic Acid (VITAMIN C CR PO) Take  by mouth.     aspirin 81 MG tablet Take 1 tablet by mouth At Bedtime      ASPIRIN PO Take 325 mg by mouth daily 2 tablets daily for arthritis     EPINEPHrine (EPIPEN 2-NETO) 0.3 MG/0.3ML injection Inject 0.3 mLs into the muscle once as needed for anaphylaxis for 1 dose.     Multiple Minerals-Vitamins (CALCIUM-MAGNESIUM-ZINC-D3) TABS Take 1 tablet by mouth 2 times daily     Multiple Vitamin (MULTI-VITAMINS) TABS Take 1 tablet by mouth daily     omeprazole (PRILOSEC) 20 MG CR capsule Take 1 capsule (20 mg) by mouth daily     order for DME CPAP 9 cm H20     RESVERATROL PO Take 300 mg by mouth daily      No current facility-administered medications for this visit.         Allergies   Allergen Reactions     Septra [Sulfamethoxazole W-Trimethoprim]      Urinary symptoms            Past Medical History:     Does not need 02 supplement at night   Past Medical History:   Diagnosis Date     Kidney stone 11/10/2011    Diagnosed by ultrasound 10/24/11 3-4 mm      Lung nodule      MEDIAL MENISCUS TEAR - left 10/24/2011             Past Surgical History:    Previous upper airway surgery no   Past Surgical History:   Procedure Laterality Date     CARDIAC STRESS TST,COMPLETE  2006      KNEE SCOPE,MED/LAT MENISECTOMY  11/11/11    left, with partial medial menisectomy ONLY            Social History:     Social History      Tobacco Use     Smoking status: Former Smoker     Packs/day: 1.00     Years: 35.00     Pack years: 35.00     Types: Cigarettes, Cigars, Pipe     Start date: 1963     Last attempt to quit: 10/13/1998     Years since quittin.7     Smokeless tobacco: Never Used   Substance Use Topics     Alcohol use: Yes     Comment: about a beer a week               Family History:     Family History   Problem Relation Age of Onset     Prostate Cancer Father      C.A.D. Father      Diabetes Paternal Grandfather      Hypertension No family hx of      Cancer No family hx of                  Review of Systems:     A complete 10 point review of systems was negative other than HPI or as commented below:     Increased visual acuity  Occasional abdominal discomfort  Chronic knee pain following meniscal injury         Physical Examination:   There were no vitals taken for this visit.     Constitutional: . Awake, alert, cooperative, dressed casually, good eye contact, comfortably sitting in a chair, in no apparent distress  Mood: euthymic; affect congruent with full range and intensity.  Attention/Concentration:  Normal   Eyes: No icterus.  ENT: Mallampati Class: I.   Tonsillar Stage: 1  hidden by pillars  Dental implants normal mandibular movement  Cardiovascular: Regular S1 and S2, no gallops or murmurs. No carotid bruits  Neck: Supple, no thyroid enlargement.   Pulmonary:  Chest symmetric, lungs clear bilaterally and no crackles, wheezes or rales  Extremities:  No pedal edema.  Muscle/joint: Strength and tone normal   Skin:  No rash or significant lesions.   Gait Normal.  Neurologic: Alert, oriented x3, no focal neurological deficit, cranial nerves grossly normal            Data: All pertinent previous laboratory data reviewed     No results found for: PH, PHARTERIAL, PO2, [id number], SAT, PCO2, HCO3, BASEEXCESS, CELENA, BEB  Lab Results   Component Value Date    TSH 2.48 2016    TSH 2.53 2014     Lab Results    Component Value Date     (H) 10/29/2018     (H) 06/07/2017     Lab Results   Component Value Date    HGB 15.7 06/07/2017    HGB 15.9 08/05/2016     Lab Results   Component Value Date    BUN 10 10/29/2018    BUN 12 06/07/2017    CR 0.97 10/29/2018    CR 0.94 06/07/2017     Lab Results   Component Value Date    AST 46 (H) 06/07/2017    AST 38 09/19/2016    ALT 69 06/07/2017    ALT 72 (H) 09/19/2016    GGT 44 07/10/2014    ALKPHOS 65 06/07/2017    ALKPHOS 64 09/19/2016    BILITOTAL 1.1 06/07/2017    BILITOTAL 0.8 09/19/2016    BILICONJ 0.0 07/02/2014    BARBARA 30 11/02/2014     No results found for: UAMP, UBARB, BENZODIAZEUR, UCANN, UCOC, OPIT, UPCP      Copy to: Frannie Talavera MD 7/1/2019     Total time spent with patient: 60 min >50% counseling

## 2019-07-08 LAB
DLCOUNC-%PRED-PRE: 85 %
DLCOUNC-PRE: 20.96 ML/MIN/MMHG
DLCOUNC-PRED: 24.48 ML/MIN/MMHG
ERV-%PRED-PRE: 76 %
ERV-PRE: 0.65 L
ERV-PRED: 0.85 L
EXPTIME-PRE: 7.04 SEC
FEF2575-%PRED-PRE: 102 %
FEF2575-PRE: 2.29 L/SEC
FEF2575-PRED: 2.24 L/SEC
FEFMAX-%PRED-PRE: 121 %
FEFMAX-PRE: 9.46 L/SEC
FEFMAX-PRED: 7.77 L/SEC
FEV1-%PRED-PRE: 102 %
FEV1-PRE: 3.07 L
FEV1FEV6-PRE: 76 %
FEV1FEV6-PRED: 77 %
FEV1FVC-PRE: 74 %
FEV1FVC-PRED: 76 %
FEV1SVC-PRE: 70 %
FEV1SVC-PRED: 67 %
FIFMAX-PRE: 5.16 L/SEC
FRCPLETH-%PRED-PRE: 69 %
FRCPLETH-PRE: 2.55 L
FRCPLETH-PRED: 3.65 L
FVC-%PRED-PRE: 104 %
FVC-PRE: 4.13 L
FVC-PRED: 3.96 L
IC-%PRED-PRE: 103 %
IC-PRE: 3.75 L
IC-PRED: 3.62 L
RVPLETH-%PRED-PRE: 71 %
RVPLETH-PRE: 1.89 L
RVPLETH-PRED: 2.66 L
TLCPLETH-%PRED-PRE: 91 %
TLCPLETH-PRE: 6.3 L
TLCPLETH-PRED: 6.87 L
VA-%PRED-PRE: 99 %
VA-PRE: 6.06 L
VC-%PRED-PRE: 98 %
VC-PRE: 4.4 L
VC-PRED: 4.47 L

## 2019-07-11 ENCOUNTER — DOCUMENTATION ONLY (OUTPATIENT)
Dept: SLEEP MEDICINE | Facility: CLINIC | Age: 74
End: 2019-07-11

## 2019-07-11 NOTE — PROGRESS NOTES
Patient was offered choice of vendor and chose [facility].  Patient Garrett Del Rio was set up at [facility] on July 11, 2019. Patient received a replacement  Resmed AirSense 10 Auto. Pressures were set at 9-18 cm H2O.   Patient s ramp is 5 cm H2O for Off and FLEX/EPR is A Flex, 2.  Patient received a Moe Respironics Mask name: Nuance pro  Pillow mask size Large, heated tubing and heated humidifier.  Patient is not enrolled in the STM Program and does need to meet compliance. Patient has a follow up on tbd with Dr. Feldman.    Randal Cotetr

## 2019-07-13 NOTE — PROGRESS NOTES
This patient has a future lab only appointment on 10/29/2018 and needs orders. Please sign the pended labs taken from the overdue  and make any needed changes. Thanks courtney  
No

## 2019-07-15 ENCOUNTER — OFFICE VISIT (OUTPATIENT)
Dept: SURGERY | Facility: CLINIC | Age: 74
End: 2019-07-15
Payer: COMMERCIAL

## 2019-07-15 VITALS
WEIGHT: 195 LBS | OXYGEN SATURATION: 97 % | HEART RATE: 88 BPM | SYSTOLIC BLOOD PRESSURE: 158 MMHG | RESPIRATION RATE: 16 BRPM | HEIGHT: 70 IN | BODY MASS INDEX: 27.92 KG/M2 | DIASTOLIC BLOOD PRESSURE: 72 MMHG

## 2019-07-15 DIAGNOSIS — Z98.890 POST-OPERATIVE STATE: Primary | ICD-10-CM

## 2019-07-15 PROBLEM — R10.30 LOWER ABDOMINAL PAIN: Status: RESOLVED | Noted: 2018-09-10 | Resolved: 2019-07-15

## 2019-07-15 PROCEDURE — 99024 POSTOP FOLLOW-UP VISIT: CPT | Performed by: SURGERY

## 2019-07-15 ASSESSMENT — MIFFLIN-ST. JEOR: SCORE: 1635.76

## 2019-07-15 NOTE — LETTER
7/15/2019         RE: Garrett Del Rio  [address]  [facility] [address]        Dear Colleague,    Thank you for referring your patient, Garrett Del Rio, to the [facility]. Please see a copy of my visit note below.      HISTORY     Chief Complaint   Patient presents with     Surgical Followup     HISTORY OF PRESENT ILLNESS: Garrett Del Rio is a 73 year old male with a past medical history as noted below, presents for follow up after Laparoscopic Right Inguinal Hernia Repair with Mesh surgery. Doing well. Tolerating diet and having bowel movements. No nausea or vomiting    PAST MEDICAL/SURGICAL HISTORY  Past Medical History:   Diagnosis Date     Kidney stone 11/10/2011    Diagnosed by ultrasound 10/24/11 3-4 mm      Lung nodule      MEDIAL MENISCUS TEAR - left 10/24/2011     Past Surgical History:   Procedure Laterality Date     CARDIAC STRESS TST,COMPLETE  2006      KNEE SCOPE,MED/LAT MENISECTOMY  11/11/11    left, with partial medial menisectomy ONLY     HERNIA REPAIR Right     [facility]: Nigel Poon D.O.       MEDICATIONS AND ALLERGIES  Allergies   Allergen Reactions     Septra [Sulfamethoxazole W-Trimethoprim]      Urinary symptoms       Current Outpatient Medications:      Ascorbic Acid (VITAMIN C CR PO), Take  by mouth., Disp: , Rfl:      aspirin 81 MG tablet, Take 1 tablet by mouth At Bedtime , Disp: , Rfl:      ASPIRIN PO, Take 325 mg by mouth daily 2 tablets daily for arthritis, Disp: , Rfl:      EPINEPHrine (EPIPEN 2-NETO) 0.3 MG/0.3ML injection, Inject 0.3 mLs into the muscle once as needed for anaphylaxis for 1 dose., Disp: 2 each, Rfl: 3     Multiple Minerals-Vitamins (CALCIUM-MAGNESIUM-ZINC-D3) TABS, Take 1 tablet by mouth 2 times daily, Disp: , Rfl:      Multiple Vitamin (MULTI-VITAMINS) TABS, Take 1 tablet by mouth daily, Disp: , Rfl:      omeprazole (PRILOSEC) 20 MG CR capsule, Take 1 capsule (20 mg) by mouth daily, Disp: 90 capsule, Rfl: 3     order for DME, CPAP 9 cm  H20, Disp: 1 Units, Rfl: 0     RESVERATROL PO, Take 300 mg by mouth daily , Disp: , Rfl:     SOCIAL HISTORY  Social History     Socioeconomic History     Marital status:      Spouse name: Not on file     Number of children: Not on file     Years of education: Not on file     Highest education level: Not on file   Occupational History     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     Occupation: Drives school Bus   Social Needs     Financial resource strain: Not on file     Food insecurity:     Worry: Not on file     Inability: Not on file     Transportation needs:     Medical: Not on file     Non-medical: Not on file   Tobacco Use     Smoking status: Former Smoker     Packs/day: 1.00     Years: 35.00     Pack years: 35.00     Types: Cigarettes, Cigars, Pipe     Start date: 1963     Last attempt to quit: 10/13/1998     Years since quittin.7     Smokeless tobacco: Never Used   Substance and Sexual Activity     Alcohol use: Yes     Comment: about a beer a week      Drug use: No     Sexual activity: Not Currently     Partners: Female     Birth control/protection: None   Lifestyle     Physical activity:     Days per week: Not on file     Minutes per session: Not on file     Stress: Not on file   Relationships     Social connections:     Talks on phone: Not on file     Gets together: Not on file     Attends Anabaptist service: Not on file     Active member of club or organization: Not on file     Attends meetings of clubs or organizations: Not on file     Relationship status: Not on file     Intimate partner violence:     Fear of current or ex partner: Not on file     Emotionally abused: Not on file     Physically abused: Not on file     Forced sexual activity: Not on file   Other Topics Concern     Parent/sibling w/ CABG, MI or angioplasty before 65F 55M? No   Social History Narrative     Not on file        FAMILY HISTORY  Family History   Problem Relation Age  "of Onset     Prostate Cancer Father      C.A.D. Father      Diabetes Paternal Grandfather      Hypertension No family hx of      Cancer No family hx of        EXAMINATION     Vitals: /72   Pulse 88   Resp 16   Ht 1.778 m (5' 10\")   Wt 88.5 kg (195 lb)   SpO2 97%   BMI 27.98 kg/m     BMI: Body mass index is 27.98 kg/m .    GENERAL/PSYCH: Patient is awake, A&Ox3, NAD, stable mood, good judgement and insight.  HEAD: Atraumatic, Normocephalic  EYES: Anicteric. Pupils equal and reactive  NECK: No masses/LNs. Trachea midline.  CHEST: Symmetrical, Respiratory effort WNL, no stridor.  HEART: Regular Rate and Rhythm.   ABDOMEN: Soft, non distended with minimal tenderness  INCISION: healing well, no drainage with minimal pain  LOWER EXTREMITIES: No gross deformity. Pulses palpable and equal bilaterally.  SKIN: No visible generalized rash.       ASSESSMENT & PLAN     S/P Laparoscopic Right Inguinal Hernia Repair with Mesh.  Recovering well.  May resume full activities in 4 weeks.  Follow up as needed.  Follow up with PCP.  Author: Nigel Poon 7/15/2019 9:34 AM  Patient's Primary Care Provider: Frannie Talavera        Again, thank you for allowing me to participate in the care of your patient.        Sincerely,        Nigel Poon, DO    "

## 2019-07-15 NOTE — PROGRESS NOTES
HISTORY     Chief Complaint   Patient presents with     Surgical Followup     HISTORY OF PRESENT ILLNESS: Garrett Del Rio is a 73 year old male with a past medical history as noted below, presents for follow up after Laparoscopic Right Inguinal Hernia Repair with Mesh surgery. Doing well. Tolerating diet and having bowel movements. No nausea or vomiting    PAST MEDICAL/SURGICAL HISTORY  Past Medical History:   Diagnosis Date     Kidney stone 11/10/2011    Diagnosed by ultrasound 10/24/11 3-4 mm      Lung nodule      MEDIAL MENISCUS TEAR - left 10/24/2011     Past Surgical History:   Procedure Laterality Date     CARDIAC STRESS TST,COMPLETE  2006     HC KNEE SCOPE,MED/LAT MENISECTOMY  11/11/11    left, with partial medial menisectomy ONLY     HERNIA REPAIR Right     [facility]: Nigel Poon D.O.       MEDICATIONS AND ALLERGIES  Allergies   Allergen Reactions     Septra [Sulfamethoxazole W-Trimethoprim]      Urinary symptoms       Current Outpatient Medications:      Ascorbic Acid (VITAMIN C CR PO), Take  by mouth., Disp: , Rfl:      aspirin 81 MG tablet, Take 1 tablet by mouth At Bedtime , Disp: , Rfl:      ASPIRIN PO, Take 325 mg by mouth daily 2 tablets daily for arthritis, Disp: , Rfl:      EPINEPHrine (EPIPEN 2-NETO) 0.3 MG/0.3ML injection, Inject 0.3 mLs into the muscle once as needed for anaphylaxis for 1 dose., Disp: 2 each, Rfl: 3     Multiple Minerals-Vitamins (CALCIUM-MAGNESIUM-ZINC-D3) TABS, Take 1 tablet by mouth 2 times daily, Disp: , Rfl:      Multiple Vitamin (MULTI-VITAMINS) TABS, Take 1 tablet by mouth daily, Disp: , Rfl:      omeprazole (PRILOSEC) 20 MG CR capsule, Take 1 capsule (20 mg) by mouth daily, Disp: 90 capsule, Rfl: 3     order for DME, CPAP 9 cm H20, Disp: 1 Units, Rfl: 0     RESVERATROL PO, Take 300 mg by mouth daily , Disp: , Rfl:     SOCIAL HISTORY  Social History     Socioeconomic History     Marital status:      Spouse name: Not on file     Number of children: Not on file  "    Years of education: Not on file     Highest education level: Not on file   Occupational History     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     Occupation: Drives school Bus   Social Needs     Financial resource strain: Not on file     Food insecurity:     Worry: Not on file     Inability: Not on file     Transportation needs:     Medical: Not on file     Non-medical: Not on file   Tobacco Use     Smoking status: Former Smoker     Packs/day: 1.00     Years: 35.00     Pack years: 35.00     Types: Cigarettes, Cigars, Pipe     Start date: 1963     Last attempt to quit: 10/13/1998     Years since quittin.7     Smokeless tobacco: Never Used   Substance and Sexual Activity     Alcohol use: Yes     Comment: about a beer a week      Drug use: No     Sexual activity: Not Currently     Partners: Female     Birth control/protection: None   Lifestyle     Physical activity:     Days per week: Not on file     Minutes per session: Not on file     Stress: Not on file   Relationships     Social connections:     Talks on phone: Not on file     Gets together: Not on file     Attends Bahai service: Not on file     Active member of club or organization: Not on file     Attends meetings of clubs or organizations: Not on file     Relationship status: Not on file     Intimate partner violence:     Fear of current or ex partner: Not on file     Emotionally abused: Not on file     Physically abused: Not on file     Forced sexual activity: Not on file   Other Topics Concern     Parent/sibling w/ CABG, MI or angioplasty before 65F 55M? No   Social History Narrative     Not on file        FAMILY HISTORY  Family History   Problem Relation Age of Onset     Prostate Cancer Father      C.A.D. Father      Diabetes Paternal Grandfather      Hypertension No family hx of      Cancer No family hx of        EXAMINATION     Vitals: /72   Pulse 88   Resp 16   Ht 1.778 m (5' 10\")   " Wt 88.5 kg (195 lb)   SpO2 97%   BMI 27.98 kg/m    BMI: Body mass index is 27.98 kg/m .    GENERAL/PSYCH: Patient is awake, A&Ox3, NAD, stable mood, good judgement and insight.  HEAD: Atraumatic, Normocephalic  EYES: Anicteric. Pupils equal and reactive  NECK: No masses/LNs. Trachea midline.  CHEST: Symmetrical, Respiratory effort WNL, no stridor.  HEART: Regular Rate and Rhythm.   ABDOMEN: Soft, non distended with minimal tenderness  INCISION: healing well, no drainage with minimal pain  LOWER EXTREMITIES: No gross deformity. Pulses palpable and equal bilaterally.  SKIN: No visible generalized rash.       ASSESSMENT & PLAN     S/P Laparoscopic Right Inguinal Hernia Repair with Mesh.  Recovering well.  May resume full activities in 4 weeks.  Follow up as needed.  Follow up with PCP.  Author: Nigel Poon 7/15/2019 9:34 AM  Patient's Primary Care Provider: Frannie Talavera

## 2019-07-22 ENCOUNTER — TRANSFERRED RECORDS (OUTPATIENT)
Dept: HEALTH INFORMATION MANAGEMENT | Facility: CLINIC | Age: 74
End: 2019-07-22

## 2019-08-08 ENCOUNTER — OFFICE VISIT (OUTPATIENT)
Dept: FAMILY MEDICINE | Facility: CLINIC | Age: 74
End: 2019-08-08
Payer: COMMERCIAL

## 2019-08-08 VITALS
WEIGHT: 190 LBS | SYSTOLIC BLOOD PRESSURE: 130 MMHG | TEMPERATURE: 98.2 F | HEIGHT: 70 IN | BODY MASS INDEX: 27.2 KG/M2 | HEART RATE: 92 BPM | DIASTOLIC BLOOD PRESSURE: 72 MMHG

## 2019-08-08 DIAGNOSIS — Z13.6 CARDIOVASCULAR SCREENING; LDL GOAL LESS THAN 160: Chronic | ICD-10-CM

## 2019-08-08 DIAGNOSIS — K40.90 INGUINAL HERNIA WITHOUT OBSTRUCTION OR GANGRENE, RECURRENCE NOT SPECIFIED, UNSPECIFIED LATERALITY: Primary | ICD-10-CM

## 2019-08-08 DIAGNOSIS — Z71.89 ADVANCED DIRECTIVES, COUNSELING/DISCUSSION: Chronic | ICD-10-CM

## 2019-08-08 DIAGNOSIS — G47.33 OSA (OBSTRUCTIVE SLEEP APNEA): Chronic | ICD-10-CM

## 2019-08-08 PROCEDURE — 99213 OFFICE O/P EST LOW 20 MIN: CPT | Performed by: FAMILY MEDICINE

## 2019-08-08 ASSESSMENT — MIFFLIN-ST. JEOR: SCORE: 1613.08

## 2019-08-08 NOTE — PROGRESS NOTES
"Subjective     Garrett Del Rio is a 73 year old male who presents to clinic today for the following health issues:  He is accompanied by his wife.      HPI     Patient doing better post hernia repair on 6/28.     Mood  Patient no longer following up with therapist, as he \"did not see any progress\". He also notes not being able to understand his therapist due to language barriers. Her english was \"not that good\". Options of medications and lifestyle adjustments discussed with patient at his last office visit. Patient not interested at this time.     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 CPAP used at night. Wife notes that patient takes frequent naps on days they are not active.  Although does note that on days when they have activities going on, he doesn't nap at all.  On days when he does nap, it doesn't cause any difficulty with falling asleep at night.    Patient Active Problem List   Diagnosis     GERD (gastroesophageal reflux disease)     Esophageal spasms     CARDIOVASCULAR SCREENING; LDL GOAL LESS THAN 160     Hypertension goal BP (blood pressure) < 140/90     OA (OSTEOARTHRITIS) OF KNEE - left     Prostate cancer screening     ADOLFO (obstructive sleep apnea)- moderate (AHI 21)     Mantoux: positive     Advanced directives, counseling/discussion     Hiatal hernia     Colon polyp     Diverticulosis of large intestine     Psychophysiologic insomnia     Nonalcoholic fatty liver disease     Past Surgical History:   Procedure Laterality Date     CARDIAC STRESS TST,COMPLETE  2006     HC KNEE SCOPE,MED/LAT MENISECTOMY  11/11/11    left, with partial medial menisectomy ONLY     HERNIA REPAIR Right     [facility]: Nigel Poon D.O.       Social History     Tobacco Use     Smoking status: Former Smoker     Packs/day: 1.00     Years: 35.00     Pack years: 35.00     Types: Cigarettes, Cigars, Pipe     Start date: 6/6/1963     Last attempt to quit: 10/13/1998     Years since " "quittin.8     Smokeless tobacco: Never Used   Substance Use Topics     Alcohol use: Yes     Comment: about a beer a week      Family History   Problem Relation Age of Onset     Prostate Cancer Father      C.A.D. Father      Diabetes Paternal Grandfather      Hypertension No family hx of      Cancer No family hx of          Current Outpatient Medications   Medication Sig Dispense Refill     Ascorbic Acid (VITAMIN C CR PO) Take  by mouth.       aspirin 81 MG tablet Take 1 tablet by mouth At Bedtime        ASPIRIN PO Take 325 mg by mouth daily 2 tablets daily for arthritis       EPINEPHrine (EPIPEN 2-NETO) 0.3 MG/0.3ML injection Inject 0.3 mLs into the muscle once as needed for anaphylaxis for 1 dose. 2 each 3     Multiple Minerals-Vitamins (CALCIUM-MAGNESIUM-ZINC-D3) TABS Take 1 tablet by mouth 2 times daily       Multiple Vitamin (MULTI-VITAMINS) TABS Take 1 tablet by mouth daily       omeprazole (PRILOSEC) 20 MG CR capsule Take 1 capsule (20 mg) by mouth daily 90 capsule 3     order for DME CPAP 9 cm H20 1 Units 0     BP Readings from Last 3 Encounters:   19 130/72   07/15/19 158/72   19 151/81    Wt Readings from Last 3 Encounters:   19 86.2 kg (190 lb)   07/15/19 88.5 kg (195 lb)   19 88.9 kg (196 lb)                    Reviewed and updated as needed this visit by Provider  Allergies  Meds  Problems         Review of Systems   ROS COMP: Constitutional, HEENT, cardiovascular, pulmonary, gi and gu systems are negative, except as otherwise noted.    This document serves as a record of the services and decisions personally performed by Frannie Talavera MD. It was created on her behalf by Magaly Coronado, a trained medical scribe. The creation of this document is based on the provider's statements to the medical scribe. Magaly Coronado 2019 3:37 PM        Objective    /72   Pulse 92   Temp 98.2  F (36.8  C) (Oral)   Ht 1.778 m (5' 10\")   Wt 86.2 kg (190 lb)   BMI " "27.26 kg/m    Body mass index is 27.26 kg/m .  Physical Exam   GENERAL: healthy, alert and no distress  PSYCH: mentation appears normal, affect normal/bright    Diagnostic Test Results:  Labs reviewed in Epic        Assessment & Plan     (K40.90) Inguinal hernia without obstruction or gangrene, recurrence not specified, unspecified laterality  (primary encounter diagnosis)  Comment: Patient improving post-op   Plan:     (Z71.89) Advanced directives, counseling/discussion  Comment:   Plan: encouraged to bring in advanced directive.    (G47.33) ADOLFO (obstructive sleep apnea)- moderate (AHI 21)  Comment: recently saw sleep specialist  Plan: encouraged to follow up with sleep specialist if cannot tolerate CPAP      (Z13.6) CARDIOVASCULAR SCREENING; LDL GOAL LESS THAN 160  Comment: Health Maintenance   Plan: Lipid panel reflex to direct LDL Fasting             BMI:   Estimated body mass index is 27.26 kg/m  as calculated from the following:    Height as of this encounter: 1.778 m (5' 10\").    Weight as of this encounter: 86.2 kg (190 lb).         Patient Instructions   Please come see me this fall for your preventive health exam.    We should also do fasting lab work around that time.      Return in about 2 months (around 10/8/2019) for Preventive Health Visit/Physical.    The information in this document, created by the medical scribe for me, accurately reflects the services I personally performed and the decisions made by me. I have reviewed and approved this document for accuracy.     Frannie Mary MD  [facility]    "

## 2019-08-08 NOTE — PATIENT INSTRUCTIONS
Please come see me this fall for your preventive health exam.    We should also do fasting lab work around that time.

## 2019-10-07 DIAGNOSIS — Z13.6 CARDIOVASCULAR SCREENING; LDL GOAL LESS THAN 160: Chronic | ICD-10-CM

## 2019-10-07 LAB
ANION GAP SERPL CALCULATED.3IONS-SCNC: 7 MMOL/L (ref 3–14)
BUN SERPL-MCNC: 13 MG/DL (ref 7–30)
CALCIUM SERPL-MCNC: 8.8 MG/DL (ref 8.5–10.1)
CHLORIDE SERPL-SCNC: 104 MMOL/L (ref 94–109)
CHOLEST SERPL-MCNC: 147 MG/DL
CO2 SERPL-SCNC: 27 MMOL/L (ref 20–32)
CREAT SERPL-MCNC: 1.06 MG/DL (ref 0.66–1.25)
GFR SERPL CREATININE-BSD FRML MDRD: 69 ML/MIN/{1.73_M2}
GLUCOSE SERPL-MCNC: 127 MG/DL (ref 70–99)
HDLC SERPL-MCNC: 25 MG/DL
LDLC SERPL CALC-MCNC: 62 MG/DL
NONHDLC SERPL-MCNC: 122 MG/DL
POTASSIUM SERPL-SCNC: 4.9 MMOL/L (ref 3.4–5.3)
SODIUM SERPL-SCNC: 138 MMOL/L (ref 133–144)
TRIGL SERPL-MCNC: 301 MG/DL

## 2019-10-07 PROCEDURE — 80061 LIPID PANEL: CPT | Performed by: FAMILY MEDICINE

## 2019-10-07 PROCEDURE — 36415 COLL VENOUS BLD VENIPUNCTURE: CPT | Performed by: FAMILY MEDICINE

## 2019-10-07 PROCEDURE — 80048 BASIC METABOLIC PNL TOTAL CA: CPT | Performed by: FAMILY MEDICINE

## 2019-10-29 ENCOUNTER — TELEPHONE (OUTPATIENT)
Dept: SLEEP MEDICINE | Facility: CLINIC | Age: 74
End: 2019-10-29

## 2019-10-29 NOTE — TELEPHONE ENCOUNTER
Called pt and let him know that per his insurance guidelines that he needs to be seen by a provider stating that he is benefiting from the use of his cpap machine. Pt stated that he has a upcoming appointment scheduled. Let pt know to discuss this with his provider.

## 2019-10-31 ENCOUNTER — OFFICE VISIT (OUTPATIENT)
Dept: FAMILY MEDICINE | Facility: CLINIC | Age: 74
End: 2019-10-31
Payer: COMMERCIAL

## 2019-10-31 VITALS
TEMPERATURE: 97.8 F | SYSTOLIC BLOOD PRESSURE: 132 MMHG | HEART RATE: 80 BPM | DIASTOLIC BLOOD PRESSURE: 70 MMHG | WEIGHT: 195 LBS | HEIGHT: 70 IN | BODY MASS INDEX: 27.92 KG/M2

## 2019-10-31 DIAGNOSIS — F32.0 CURRENT MILD EPISODE OF MAJOR DEPRESSIVE DISORDER WITHOUT PRIOR EPISODE (H): ICD-10-CM

## 2019-10-31 DIAGNOSIS — Z23 NEED FOR PROPHYLACTIC VACCINATION AND INOCULATION AGAINST INFLUENZA: ICD-10-CM

## 2019-10-31 DIAGNOSIS — Z12.5 SCREENING FOR PROSTATE CANCER: ICD-10-CM

## 2019-10-31 DIAGNOSIS — Z00.00 ENCOUNTER FOR MEDICARE ANNUAL WELLNESS EXAM: ICD-10-CM

## 2019-10-31 DIAGNOSIS — K21.9 GASTROESOPHAGEAL REFLUX DISEASE WITHOUT ESOPHAGITIS: Chronic | ICD-10-CM

## 2019-10-31 DIAGNOSIS — R73.9 ELEVATED BLOOD SUGAR: ICD-10-CM

## 2019-10-31 DIAGNOSIS — Z00.00 MEDICARE ANNUAL WELLNESS VISIT, SUBSEQUENT: ICD-10-CM

## 2019-10-31 LAB — HBA1C MFR BLD: 5.8 % (ref 0–5.6)

## 2019-10-31 PROCEDURE — 99397 PER PM REEVAL EST PAT 65+ YR: CPT | Mod: 25 | Performed by: FAMILY MEDICINE

## 2019-10-31 PROCEDURE — 99213 OFFICE O/P EST LOW 20 MIN: CPT | Mod: 25 | Performed by: FAMILY MEDICINE

## 2019-10-31 PROCEDURE — G0008 ADMIN INFLUENZA VIRUS VAC: HCPCS | Performed by: FAMILY MEDICINE

## 2019-10-31 PROCEDURE — 82947 ASSAY GLUCOSE BLOOD QUANT: CPT | Performed by: FAMILY MEDICINE

## 2019-10-31 PROCEDURE — 90662 IIV NO PRSV INCREASED AG IM: CPT | Performed by: FAMILY MEDICINE

## 2019-10-31 PROCEDURE — 83036 HEMOGLOBIN GLYCOSYLATED A1C: CPT | Performed by: FAMILY MEDICINE

## 2019-10-31 PROCEDURE — 36415 COLL VENOUS BLD VENIPUNCTURE: CPT | Performed by: FAMILY MEDICINE

## 2019-10-31 RX ORDER — ESCITALOPRAM OXALATE 5 MG/1
TABLET ORAL
Qty: 30 TABLET | Refills: 1 | Status: SHIPPED | OUTPATIENT
Start: 2019-10-31 | End: 2019-12-06

## 2019-10-31 ASSESSMENT — ENCOUNTER SYMPTOMS
HEADACHES: 0
PALPITATIONS: 0
ARTHRALGIAS: 1
SHORTNESS OF BREATH: 0
DIZZINESS: 0
FREQUENCY: 0
SORE THROAT: 0
DYSURIA: 0
HEARTBURN: 0
CONSTIPATION: 0
HEMATURIA: 0
NERVOUS/ANXIOUS: 0
ABDOMINAL PAIN: 0
JOINT SWELLING: 0
DIARRHEA: 0
HEMATOCHEZIA: 0
CHILLS: 0
WEAKNESS: 0
EYE PAIN: 0
PARESTHESIAS: 0
COUGH: 0
MYALGIAS: 1
NAUSEA: 0
FEVER: 0

## 2019-10-31 ASSESSMENT — PATIENT HEALTH QUESTIONNAIRE - PHQ9
SUM OF ALL RESPONSES TO PHQ QUESTIONS 1-9: 13
10. IF YOU CHECKED OFF ANY PROBLEMS, HOW DIFFICULT HAVE THESE PROBLEMS MADE IT FOR YOU TO DO YOUR WORK, TAKE CARE OF THINGS AT HOME, OR GET ALONG WITH OTHER PEOPLE: NOT DIFFICULT AT ALL
SUM OF ALL RESPONSES TO PHQ QUESTIONS 1-9: 13

## 2019-10-31 ASSESSMENT — ACTIVITIES OF DAILY LIVING (ADL): CURRENT_FUNCTION: NO ASSISTANCE NEEDED

## 2019-10-31 ASSESSMENT — MIFFLIN-ST. JEOR: SCORE: 1635.76

## 2019-10-31 NOTE — PROGRESS NOTES
"SUBJECTIVE:   Garrett Del Rio is a 73 year old male who presents for Preventive Visit.  He is accompanied by his wife.    Are you in the first 12 months of your Medicare coverage?  No    Healthy Habits:     In general, how would you rate your overall health?  Good    Frequency of exercise:  None    Do you usually eat at least 4 servings of fruit and vegetables a day, include whole grains    & fiber and avoid regularly eating high fat or \"junk\" foods?  No    Taking medications regularly:  Yes    Medication side effects:  Not applicable    Ability to successfully perform activities of daily living:  No assistance needed    Home Safety:  No safety concerns identified    Hearing Impairment:  No hearing concerns    In the past 6 months, have you been bothered by leaking of urine?  No    In general, how would you rate your overall mental or emotional health?  Poor      PHQ-2 Total Score: 6    Additional concerns today:  No    Mood  Patient notes that his mood remains the same. He denies any suicidal ideation but he is not afraid to die and thinks it might be better than this life.   Therapy tried in the past with no improvement.Patient is agreeable to try medication to stabilize mood.     Sleep Apnea  Patient requires a new CPAP machine. However, his insurance will not cover the costs until documentation from his PCP is provided.  He continues to use his machine and finds that it helps reduce his snoring.    Do you feel safe in your environment? Yes    Have you ever done Advance Care Planning? (For example, a Health Directive, POLST, or a discussion with a medical provider about your wishes): Yes, patient states has an Advance Care Planning document and will bring a copy to the clinic.      Fall risk  Fallen 2 or more times in the past year?: No  Any fall with injury in the past year?: No    Cognitive Screening   1) Repeat 3 items (Leader, Season, Table)    2) Clock draw: NORMAL  3) 3 item recall: Recalls 3 " objects  Results: NORMAL clock, 3 items recalled: COGNITIVE IMPAIRMENT LESS LIKELY    Mini-CogTM Copyright S Sharon. Licensed by the author for use in [facility]; reprinted with permission (stew@[facility]). All rights reserved.      Do you have sleep apnea, excessive snoring or daytime drowsiness?: yes    Reviewed and updated as needed this visit by clinical staff  Tobacco  Allergies  Meds  Problems         Reviewed and updated as needed this visit by Provider  Allergies  Meds  Problems        Social History     Tobacco Use     Smoking status: Former Smoker     Packs/day: 1.00     Years: 35.00     Pack years: 35.00     Types: Cigarettes, Cigars, Pipe     Start date: 1963     Last attempt to quit: 10/13/1998     Years since quittin.0     Smokeless tobacco: Never Used   Substance Use Topics     Alcohol use: Yes     Comment: about a beer a week      If you drink alcohol do you typically have >3 drinks per day or >7 drinks per week? No    Alcohol Use 10/31/2019   Prescreen: >3 drinks/day or >7 drinks/week? No   Prescreen: >3 drinks/day or >7 drinks/week? -   No flowsheet data found.    [facility] Follow-up to PHQ 2018 2019 10/31/2019   PHQ-9 9. Suicide Ideation past 2 weeks Not at all More than half the days Several days   Thoughts of suicide or self harm in past 2 weeks - - No   Thoughts of suicide or self harm in past 2 weeks - - No   PHQ-9 Safety concerns? - - No   PHQ-9 Safety concerns? - - No       Current providers sharing in care for this patient include:   Patient Care Team:  Frannie Talavera MD as PCP - Charlie Albarran MD as MD (Orthopedics)  Addy Juan MD as MD (Urology)  Frannie Talavera MD as Assigned PCP    The following health maintenance items are reviewed in Epic and correct as of today:  Health Maintenance   Topic Date Due     DEPRESSION ACTION PLAN  1945     MEDICARE ANNUAL WELLNESS VISIT  2013     INFLUENZA  VACCINE (1) 2019     PSA  10/29/2019     FALL RISK ASSESSMENT  2019     PHQ-9  2019     BMP  2020     LIPID  10/07/2020     DTAP/TDAP/TD IMMUNIZATION (3 - Td) 2023     COLONOSCOPY  06/15/2023     ADVANCE CARE PLANNING  2024     HEPATITIS C SCREENING  Completed     PNEUMOCOCCAL IMMUNIZATION 65+ LOW/MEDIUM RISK  Completed     ZOSTER IMMUNIZATION  Completed     AORTIC ANEURYSM SCREENING (SYSTEM ASSIGNED)  Completed     IPV IMMUNIZATION  Aged Out     MENINGITIS IMMUNIZATION  Aged Out     BP Readings from Last 3 Encounters:   10/31/19 132/70   19 130/72   07/15/19 158/72    Wt Readings from Last 3 Encounters:   10/31/19 88.5 kg (195 lb)   19 86.2 kg (190 lb)   07/15/19 88.5 kg (195 lb)                  Patient Active Problem List   Diagnosis     GERD (gastroesophageal reflux disease)     Esophageal spasms     CARDIOVASCULAR SCREENING; LDL GOAL LESS THAN 160     OA (OSTEOARTHRITIS) OF KNEE - left     Prostate cancer screening     ADOLFO (obstructive sleep apnea)- moderate (AHI 21)     Mantoux: positive     Advanced directives, counseling/discussion     Hiatal hernia     Colon polyp     Diverticulosis of large intestine     Psychophysiologic insomnia     Nonalcoholic fatty liver disease     Past Surgical History:   Procedure Laterality Date     CARDIAC STRESS TST,COMPLETE       HC KNEE SCOPE,MED/LAT MENISECTOMY  11    left, with partial medial menisectomy ONLY     HERNIA REPAIR Right     [facility]: Nigel Poon D.O.       Social History     Tobacco Use     Smoking status: Former Smoker     Packs/day: 1.00     Years: 35.00     Pack years: 35.00     Types: Cigarettes, Cigars, Pipe     Start date: 1963     Last attempt to quit: 10/13/1998     Years since quittin.0     Smokeless tobacco: Never Used   Substance Use Topics     Alcohol use: Yes     Comment: about a beer a week      Family History   Problem Relation Age of Onset     Prostate Cancer Father       "C.A.D. Father      Diabetes Paternal Grandfather      Hypertension No family hx of      Cancer No family hx of            Review of Systems   Constitutional: Negative for chills and fever.   HENT: Negative for congestion, ear pain, hearing loss and sore throat.    Eyes: Positive for visual disturbance. Negative for pain.   Respiratory: Negative for cough and shortness of breath.    Cardiovascular: Negative for chest pain, palpitations and peripheral edema.   Gastrointestinal: Negative for abdominal pain, constipation, diarrhea, heartburn, hematochezia and nausea.   Musculoskeletal: Positive for arthralgias and myalgias. Negative for joint swelling.   Skin: Negative for rash.   Neurological: Negative for dizziness, weakness, headaches and paresthesias.   Psychiatric/Behavioral: Positive for mood changes. The patient is not nervous/anxious.      This document serves as a record of the services and decisions personally performed by Frannie Talavera MD. It was created on her behalf by Magaly Coronado, a trained medical scribe. The creation of this document is based on the provider's statements to the medical scribe. Magaly Coronado October 31, 2019 1:01 PM    OBJECTIVE:   /70   Pulse 80   Temp 97.8  F (36.6  C) (Oral)   Ht 1.778 m (5' 10\")   Wt 88.5 kg (195 lb)   BMI 27.98 kg/m   Estimated body mass index is 27.98 kg/m  as calculated from the following:    Height as of this encounter: 1.778 m (5' 10\").    Weight as of this encounter: 88.5 kg (195 lb).  Physical Exam  GENERAL: healthy, alert and no distress  EYES: Eyes grossly normal to inspection, PERRL and conjunctivae and sclerae normal  HENT: ear canals and TM's normal, nose and mouth without ulcers or lesions  NECK: no adenopathy, no asymmetry, masses, or scars and thyroid normal to palpation  RESP: lungs clear to auscultation - no rales, rhonchi or wheezes  CV: regular rate and rhythm, normal S1 S2, no S3 or S4, no murmur, click or rub, no peripheral " edema and peripheral pulses strong  MS: no gross musculoskeletal defects noted, no edema  SKIN: no suspicious lesions or rashes  NEURO: Normal strength and tone, mentation intact and speech normal  PSYCH: mentation appears normal, affect normal/bright    Diagnostic Test Results:  Labs reviewed in Epic    ASSESSMENT / PLAN:   (Z00.00) Medicare annual wellness visit, subsequent  (primary encounter diagnosis)  Comment: stable health  Plan:  Other health care maintenance up to date per chart or patient report.      (R73.9) Elevated blood sugar  Comment: Labs from 10/7 showed elevated glucose   Plan: Glucose, Hemoglobin A1c        Adjust treatment pending lab results    (K21.9) Gastroesophageal reflux disease without esophagitis  Comment: long standing, Controlled  Plan: omeprazole (PRILOSEC) 20 MG DR capsule        Continue with current medications    (Z12.5) Screening for prostate cancer  Comment: is followed by urology and saw them this summer  Plan: will obtain PSA result    (Z00.00) Encounter for Medicare annual wellness exam  Comment: Negative screening exam; up-to-date on preventive services.  Plan: Follow up in 1 year for physical     (Z23) Need for prophylactic vaccination and inoculation against influenza  Comment: Health Maintenance  Plan: INFLUENZA (HIGH DOSE) 3 VALENT VACCINE [10587],        ADMIN INFLUENZA (For MEDICARE Patients ONLY)         []          (F32.0) Current mild episode of major depressive disorder without prior episode (H)  Comment: Patient feels ready to start medication to manage symptoms, did not find therapy helpful.  Plan: escitalopram (LEXAPRO) 5 MG tablet        Take as directed in AVS. Patient to follow up in 4-6 weeks to recheck.   I discussed with the patient risks and benefits of the new medications prescribed including potential side effects.  The patient had opportunity to ask questions and is comfortable with and interested in medications as  "prescribed.      COUNSELING:  Reviewed preventive health counseling, as reflected in patient instructions       Regular exercise       Healthy diet/nutrition       Vision screening       Prostate cancer screening    Estimated body mass index is 27.98 kg/m  as calculated from the following:    Height as of this encounter: 1.778 m (5' 10\").    Weight as of this encounter: 88.5 kg (195 lb).    Weight management plan: Discussed healthy diet and exercise guidelines     reports that he quit smoking about 21 years ago. His smoking use included cigarettes, cigars, and pipe. He started smoking about 56 years ago. He has a 35.00 pack-year smoking history. He has never used smokeless tobacco.    Appropriate preventive services were discussed with this patient, including applicable screening as appropriate for cardiovascular disease, diabetes, osteopenia/osteoporosis, and glaucoma.  As appropriate for age/gender, discussed screening for colorectal cancer, prostate cancer, breast cancer, and cervical cancer. Checklist reviewing preventive services available has been given to the patient.    Reviewed patients plan of care and provided an AVS. The Basic Care Plan (routine screening as documented in Health Maintenance) for Garrett meets the Care Plan requirement. This Care Plan has been established and reviewed with the Patient.    Counseling Resources:  ATP IV Guidelines  Pooled Cohorts Equation Calculator  Breast Cancer Risk Calculator  FRAX Risk Assessment  ICSI Preventive Guidelines  Dietary Guidelines for Americans, 2010  USDA's MyPlate  ASA Prophylaxis  Lung CA Screening    The information in this document, created by the medical scribe for me, accurately reflects the services I personally performed and the decisions made by me. I have reviewed and approved this document for accuracy.     Frannie Mary MD  [facility]    Identified Health Risks:  Answers for HPI/ROS submitted by the patient on 10/31/2019 "   Annual Exam:  If you checked off any problems, how difficult have these problems made it for you to do your work, take care of things at home, or get along with other people?: Not difficult at all  PHQ9 TOTAL SCORE: 13

## 2019-10-31 NOTE — PATIENT INSTRUCTIONS
Start Lexapro. Take  2.5 mg (1/2 tablet) for 1 week, and then increase to 5 mg (1 tablet).   Medication may cause loose stools or brain fog in the beginning.     Patient Education   Personalized Prevention Plan  You are due for the preventive services outlined below.  Your care team is available to assist you in scheduling these services.  If you have already completed any of these items, please share that information with your care team to update in your medical record.  Health Maintenance Due   Topic Date Due

## 2019-11-01 LAB — GLUCOSE SERPL-MCNC: 119 MG/DL (ref 70–99)

## 2019-11-02 ASSESSMENT — PATIENT HEALTH QUESTIONNAIRE - PHQ9: SUM OF ALL RESPONSES TO PHQ QUESTIONS 1-9: 13

## 2019-11-09 ENCOUNTER — HEALTH MAINTENANCE LETTER (OUTPATIENT)
Age: 74
End: 2019-11-09

## 2019-11-25 ENCOUNTER — TELEPHONE (OUTPATIENT)
Dept: FAMILY MEDICINE | Facility: CLINIC | Age: 74
End: 2019-11-25

## 2019-11-25 NOTE — TELEPHONE ENCOUNTER
"Routing to PCP as FYI patient will be stopping escitalopram and has f/u with you on 12/6/19.    Patient saw PCP on 10/31/19 for annual exam and began 5mg Lexapro daily for depression.  Patient reports side effects, states right away he experienced dry mouth, headache, nausea.  Headache has improved, but nausea persists to the point where it is affecting his life and \"exercising my patience.\"  He feels fuzzy at times as well, doesn't want to continue this medication, as he drives a school bus.  He also report it has done nothing for his mood.  He denies thoughts of harming self.   Reviewed when he is taking medication, which is at night, without food.  He expresses he is not interested in taking it any other way and plans to stop the medication. He was advised not to stop abruptly, as usually these medications should be weaned. He states \"nothing could be worse than what I'm already experiencing\" and that he will just stop the medication.  He would like to inform PCP and does have a f/u scheduled for 12/6/19.    Lizz Hill RN    "

## 2019-11-25 NOTE — TELEPHONE ENCOUNTER
Reason for Call:  Other call back    Detailed comments: patient is having issues with medication side effects. escitalopram (LEXAPRO) 5 MG tablet    Phone Number Patient can be reached at: Cell number on file:    Telephone Information:   Mobile [phone]       Best Time: available until 2:30 pm    Can we leave a detailed message on this number? YES    Call taken on 11/25/2019 at 8:50 AM by Duke Alcantar

## 2019-12-06 ENCOUNTER — OFFICE VISIT (OUTPATIENT)
Dept: FAMILY MEDICINE | Facility: CLINIC | Age: 74
End: 2019-12-06
Payer: COMMERCIAL

## 2019-12-06 VITALS
DIASTOLIC BLOOD PRESSURE: 78 MMHG | TEMPERATURE: 98.1 F | SYSTOLIC BLOOD PRESSURE: 138 MMHG | HEART RATE: 78 BPM | WEIGHT: 192 LBS | OXYGEN SATURATION: 97 % | HEIGHT: 70 IN | BODY MASS INDEX: 27.49 KG/M2

## 2019-12-06 DIAGNOSIS — R73.01 IMPAIRED FASTING GLUCOSE: ICD-10-CM

## 2019-12-06 DIAGNOSIS — F32.0 MILD MAJOR DEPRESSION (H): Primary | ICD-10-CM

## 2019-12-06 PROCEDURE — 99214 OFFICE O/P EST MOD 30 MIN: CPT | Performed by: FAMILY MEDICINE

## 2019-12-06 ASSESSMENT — PATIENT HEALTH QUESTIONNAIRE - PHQ9: SUM OF ALL RESPONSES TO PHQ QUESTIONS 1-9: 13

## 2019-12-06 ASSESSMENT — MIFFLIN-ST. JEOR: SCORE: 1622.16

## 2019-12-06 NOTE — PROGRESS NOTES
"Subjective     Garrett Del Rio is a 73 year old male who presents to clinic with his wifetoday for the following health issues:    HPI   Depression Followup    How are you doing with your depression since your last visit? See above, medication didn't help any     Are you having other symptoms that might be associated with depression? Yes:  see note below, medication was not effective for him     Have you had a significant life event?  No     Are you feeling anxious or having panic attacks?   No    Do you have any concerns with your use of alcohol or other drugs? No     -Patient saw PCP on 10/31/19 for annual exam and began 5 mg Lexapro daily for depression. He took 1/2 pill for a week then increased to 1 whole pill. Patient reports side effects of dry mouth, headache, nausea. Headache has improved, but nausea persists to the point where it is affecting his life and \"exercising my patience.\"  He feels fuzzy at times as well, doesn't want to continue this medication.  He also reports it has done nothing for his mood. He denies thoughts of harming self, but believes that there is a better place than earth and he isn't afraid of dying.  Reviewed when he is taking medication, which is at night, without food. He expresses he is not interested in taking it any other way and plans to stop Lexapro. He was advised not to stop abruptly, as usually these medications should be weaned. He states \"nothing could be worse than what I'm already experiencing\" and that he will just stop it.  - States that his daughter was in he ER twice last week which is stressful for him, but states \"it's just life\" and he can deal with it. 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     Impaired Fasting Glucose:   -Discuss recent lab results. Glucose specifically.   -In the past when his glucose was elevated he was eating a lot of mints and candy corn. Struggles with will-power " and limiting sugar and starch.    Fatigue:   -Pt continues to struggle with fatigue. His wife notes that he is SOB walking up stairs, but pt says that he simply takes a large sigh when he gets to the top because he is glad he made it. He denies needing to sit down while he walking up the stairs.   -He doesn't exercise. 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     Additional:   -Pt states that he is frustrated with his insurance and Medicare about coverage for certain cares and vaccines. This upsets him.       Social History     Tobacco Use     Smoking status: Former Smoker     Packs/day: 1.00     Years: 35.00     Pack years: 35.00     Types: Cigarettes, Cigars, Pipe     Start date: 1963     Last attempt to quit: 10/13/1998     Years since quittin.1     Smokeless tobacco: Never Used   Substance Use Topics     Alcohol use: Yes     Comment: about a beer a week      Drug use: No     PHQ 2019 10/31/2019 2019   PHQ-9 Total Score 13 13 13   Q9: Thoughts of better off dead/self-harm past 2 weeks More than half the days Several days Several days   F/U: Thoughts of suicide or self-harm - No -   F/U: Safety concerns - No -     ODILIA-7 SCORE 2018   Total Score 1 1     Last PHQ-9 2019   1.  Little interest or pleasure in doing things 2   2.  Feeling down, depressed, or hopeless 1   3.  Trouble falling or staying asleep, or sleeping too much 3   4.  Feeling tired or having little energy 3   5.  Poor appetite or overeating 3   6.  Feeling bad about yourself 0   7.  Trouble concentrating 0   8.  Moving slowly or restless 0   Q9: Thoughts of better off dead/self-harm past 2 weeks 1   PHQ-9 Total Score 13   Difficulty at work, home, or with people Not difficult at all   In the past two weeks have you had thoughts of suicide or self harm? -   Do you have concerns about your personal safety or the safety of others? -     ODILIA-7   2019   1. Feeling nervous, anxious, or on edge 0   2. Not being able to stop or control worrying 0   3. Worrying too much about different things 0   4. Trouble relaxing 0   5. Being so restless that it is hard to sit still 0   6. Becoming easily annoyed or irritable 1   7. Feeling afraid, as if something awful might happen 0   ODILIA-7 Total Score 1   If you checked any problems, how difficult have they made it for you to do your work, take care of things at home, or get along with other people? Not difficult at all     no suicide ideation noted    Suicide Assessment Five-step Evaluation and Treatment (SAFE-T)        Patient Active Problem List   Diagnosis     GERD (gastroesophageal reflux disease)     Esophageal spasms     CARDIOVASCULAR SCREENING; LDL GOAL LESS THAN 160     OA (OSTEOARTHRITIS) OF KNEE - left     Prostate cancer screening     ADOLFO (obstructive sleep apnea)- moderate (AHI 21)     Mantoux: positive     Advanced directives, counseling/discussion     Hiatal hernia     Colon polyp     Diverticulosis of large intestine     Psychophysiologic insomnia     Nonalcoholic fatty liver disease     Past Surgical History:   Procedure Laterality Date     CARDIAC STRESS TST,COMPLETE       HC KNEE SCOPE,MED/LAT MENISECTOMY  11    left, with partial medial menisectomy ONLY     HERNIA REPAIR Right     [facility]: Nigel Poon D.O.       Social History     Tobacco Use     Smoking status: Former Smoker     Packs/day: 1.00     Years: 35.00     Pack years: 35.00     Types: Cigarettes, Cigars, Pipe     Start date: 1963     Last attempt to quit: 10/13/1998     Years since quittin.1     Smokeless tobacco: Never Used   Substance Use Topics     Alcohol use: Yes     Comment: about a beer a week      Family History   Problem Relation Age of Onset     Prostate Cancer Father      C.A.D. Father      Diabetes Paternal Grandfather      Hypertension No family hx of      Cancer No family hx of            Reviewed  "and updated as needed this visit by Provider  Allergies  Meds  Problems         Review of Systems   ROS COMP: Constitutional, HEENT, cardiovascular, pulmonary, gi and gu systems are negative, except as otherwise noted.    This document serves as a record of the services and decisions personally performed by AIME BENTLEY. It was created on his/her behalf by Delores Sanchez, a trained medical scribe. The creation of this document is based on the provider's statements to the medical scribe. Delores Sanchez, December 6, 2019 9:35 AM      Objective    /78   Pulse 78   Temp 98.1  F (36.7  C) (Oral)   Ht 1.778 m (5' 10\")   Wt 87.1 kg (192 lb)   SpO2 97%   BMI 27.55 kg/m    Body mass index is 27.55 kg/m .  Physical Exam   GENERAL: healthy, alert and no distress  PSYCH: mentation appears normal, affect normal/bright    Diagnostic Test Results:  Labs reviewed in Epic  Component      Latest Ref Rng & Units 11/7/2018 10/7/2019 10/31/2019   Sodium      133 - 144 mmol/L  138    Potassium      3.4 - 5.3 mmol/L  4.9    Chloride      94 - 109 mmol/L  104    Carbon Dioxide      20 - 32 mmol/L  27    Anion Gap      3 - 14 mmol/L  7    Glucose      70 - 99 mg/dL  127 (H) 119 (H)   Urea Nitrogen      7 - 30 mg/dL  13    Creatinine      0.66 - 1.25 mg/dL  1.06    GFR Estimate      >60 mL/min/1.73:m2  69    GFR Estimate If Black      >60 mL/min/1.73:m2  80    Calcium      8.5 - 10.1 mg/dL  8.8    Cholesterol      <200 mg/dL  147    Triglycerides      <150 mg/dL  301 (H)    HDL Cholesterol      >39 mg/dL  25 (L)    LDL Cholesterol Calculated      <100 mg/dL  62    Non HDL Cholesterol      <130 mg/dL  122    Hemoglobin A1C      0 - 5.6 % 5.4  5.8 (H)             Assessment & Plan     (R73.01) Impaired fasting glucose  (primary encounter diagnosis)  Comment: Pt admits he struggles with healthy diet choices and doesn't exercise.   Plan: **A1C FUTURE 3mo, **Glucose FUTURE 2mo        Discussed healthy eating choices " "and exercise guidelines. Pt voices understanding. Fasting labs in 2 months. Based on those results will determine next steps.  Declines consideration of low dose medication at this time.      (F32.0) Mild major depression (H)  Comment: Pt did not tolerate Lexapro and is uninterested in trying another medication. He denies self harm thoughts.   Plan: Monitor symptoms. Follow-up in 6 months.      BMI:   Estimated body mass index is 27.55 kg/m  as calculated from the following:    Height as of this encounter: 1.778 m (5' 10\").    Weight as of this encounter: 87.1 kg (192 lb).       Patient Instructions     Please schedule a lab visit at the end of January/bedinning of February to recheck glucose and A1c. Be fasting for 8-10 hours before this appointment.     I encourage you to start exercising, this could be walking or going up/down stairs at home.    Patient Education     [facility]   Discharged by : Barbra Prado MA    Paper scripts provided to patient : no     If you have any questions regarding your visit please contact your care team:     Team Gold                Clinic Hours Telephone Number     Dr. Cuca Seymour, CNP 7am-7pm  Monday - Thursday   7am-5pm  Fridays  [phone]   (Appointment scheduling available 24/7)     RN Line  [phone] option 2     Urgent Care - [place] and [facility] - 11am-9pm Monday-Friday Saturday-Sunday- 9am-5pm     [place] -   5pm-9pm Monday-Friday Saturday-Sunday- 9am-5pm    [phone] - [place]    [phone] - [place]     For a Price Quote for your services, please call our Consumer Price Line at [phone].     What options do I have for visits at the clinic other than the traditional office visit?     To expand how we care for you, many of our providers are utilizing electronic visits (e-visits) and telephone visits, when medically appropriate, for interactions with their patients " rather than a visit in the clinic. We also offer nurse visits for many medical concerns. Just like any other service, we will bill your insurance company for this type of visit based on time spent on the phone with your provider. Not all insurance companies cover these visits. Please check with your medical insurance if this type of visit is covered. You will be responsible for any charges that are not paid by your insurance.   E-visits via Talk Localhart: generally incur a $45.00 fee.     Telephone visits:  Time spent on the phone: *charged based on time that is spent on the phone in increments of 10 minutes. Estimated cost:   5-10 mins $30.00   11-20 mins. $59.00   21-30 mins. $85.00     Use Lumicell (secure email communication and access to your chart) to send your primary care provider a message or make an appointment. Ask someone on your Team how to sign up for Lumicell.     As always, Thank you for trusting us with your health care needs!    [facility]:    Scheduling Appointments  [place]  Call: [phone]    [facility]  Call: [phone]    [facility]  Call: [phone]    For Gastroenterology referrals   [facility]   Clinics and Surgery Center, 4th Floor   [address] [place] [id number]   Appointments: [phone]    WHERE TO GO FOR CARE?  Clinic    Make an appointment if you:       Are sick (cold, cough, flu, sore throat, earache or in pain).       Have a small injury (sprain, small cut, burn or broken bone).       Need a physical exam, Pap smear, vaccine or prescription refill.       Have questions about your health or medicines.    To reach us:      Call [phone] ([phone]). Open 24 hours every day. (For counseling services, call [phone].)    Log into Lumicell at [email or web address]. (Visit [email or web address] to create an account.) Hospital emergency room    An  emergency is a serious or life- threatening problem that must be treated right away.    Call 911 or get to the hospital if you have:      Very bad or sudden:            - Chest pain or pressure         - Bleeding         - Head or belly pain         - Dizziness or trouble seeing, walking or                          Speaking      Problems breathing      Blood in your vomit or you are coughing up blood      A major injury (knocked out, loss of a finger or limb, rape, broken bone protruding from skin)    A mental health crisis. (Or call the Mental Health Crisis line at 1-337.396.7936 or Suicide Prevention Hotline at 1-676.494.9687.)    Open 24 hours every day. You don't need an appointment.     Urgent care    Visit urgent care for sickness or small injuries when the clinic is closed. You don't need an appointment. To check hours or find an urgent care near you, visit [email or web address]. Online care    Get online care from [facility] for more than 70 common problems, like colds, allergies and infections. Open 24 hours every day at:   [email or web address]   Need help deciding?    For advice about where to be seen, you may call your clinic and ask to speak with a nurse. We're here for you 24 hours every day.         If you are deaf or hard of hearing, please let us know. We provide many free services including sign language interpreters, oral interpreters, TTYs, telephone amplifiers, note takers and written materials.               Return in about 2 months (around 2/6/2020) for Lab Work.  Length of visit was 17 minutes with more than 50 percent of that time used for discussing medical concerns and education    The information in this document, created by the medical scribe for me, accurately reflects the services I personally performed and the decisions made by me. I have reviewed and approved this document for accuracy.   Frannie Mary MD  [facility]

## 2019-12-06 NOTE — PATIENT INSTRUCTIONS
Please schedule a lab visit at the end of January/bedinning of February to recheck glucose and A1c. Be fasting for 8-10 hours before this appointment.     I encourage you to start exercising, this could be walking or going up/down stairs at home.    Patient Education     [facility]   Discharged by : Barbra Prado MA    Paper scripts provided to patient : no     If you have any questions regarding your visit please contact your care team:     Team Gold                Clinic Hours Telephone Number     Dr. Cuca Seymour, CNP 7am-7pm  Monday - Thursday   7am-5pm  Fridays  [phone]   (Appointment scheduling available 24/7)     RN Line  [phone] option 2     Urgent Care - [place] and [facility] - 11am-9pm Monday-Friday Saturday-Sunday- 9am-5pm     [place] -   5pm-9pm Monday-Friday Saturday-Sunday- 9am-5pm    [phone] - [place]    [phone] - [place]     For a Price Quote for your services, please call our Consumer Price Line at [phone].     What options do I have for visits at the clinic other than the traditional office visit?     To expand how we care for you, many of our providers are utilizing electronic visits (e-visits) and telephone visits, when medically appropriate, for interactions with their patients rather than a visit in the clinic. We also offer nurse visits for many medical concerns. Just like any other service, we will bill your insurance company for this type of visit based on time spent on the phone with your provider. Not all insurance companies cover these visits. Please check with your medical insurance if this type of visit is covered. You will be responsible for any charges that are not paid by your insurance.   E-visits via SimplyCast: generally incur a $45.00 fee.     Telephone visits:  Time spent on the phone: *charged based on time that is spent on the phone in increments of 10 minutes.  Estimated cost:   5-10 mins $30.00   11-20 mins. $59.00   21-30 mins. $85.00     Use ODEC (secure email communication and access to your chart) to send your primary care provider a message or make an appointment. Ask someone on your Team how to sign up for ODEC.     As always, Thank you for trusting us with your health care needs!    [facility]:    Scheduling Appointments  [facility] [place]  Call: [phone]    [place] [facility]  Call: [phone]    [facility]  Call: [phone]    For Gastroenterology referrals   [facility]   Clinics and Surgery Center, 4th Floor   [address] [place] [address]   Appointments: [phone]    WHERE TO GO FOR CARE?  Clinic    Make an appointment if you:       Are sick (cold, cough, flu, sore throat, earache or in pain).       Have a small injury (sprain, small cut, burn or broken bone).       Need a physical exam, Pap smear, vaccine or prescription refill.       Have questions about your health or medicines.    To reach us:      Call [phone] ([phone]). Open 24 hours every day. (For counseling services, call [phone].)    Log into ODEC at [email or web address]. (Visit [email or web address] to create an account.) Hospital emergency room    An emergency is a serious or life- threatening problem that must be treated right away.    Call 587 or get to the hospital if you have:      Very bad or sudden:            - Chest pain or pressure         - Bleeding         - Head or belly pain         - Dizziness or trouble seeing, walking or                          Speaking      Problems breathing      Blood in your vomit or you are coughing up blood      A major injury (knocked out, loss of a finger or limb, rape, broken bone protruding from skin)    A mental health crisis. (Or call the Mental Health Crisis line at 1-655.597.8324 or Suicide Prevention  Hotline at 1-708.168.3443.)    Open 24 hours every day. You don't need an appointment.     Urgent care    Visit urgent care for sickness or small injuries when the clinic is closed. You don't need an appointment. To check hours or find an urgent care near you, visit [email or web address]. Online care    Get online care from [facility] for more than 70 common problems, like colds, allergies and infections. Open 24 hours every day at:   [email or web address]   Need help deciding?    For advice about where to be seen, you may call your clinic and ask to speak with a nurse. We're here for you 24 hours every day.         If you are deaf or hard of hearing, please let us know. We provide many free services including sign language interpreters, oral interpreters, TTYs, telephone amplifiers, note takers and written materials.

## 2020-01-27 ENCOUNTER — DOCUMENTATION ONLY (OUTPATIENT)
Dept: OTHER | Facility: CLINIC | Age: 75
End: 2020-01-27

## 2020-01-27 ENCOUNTER — TELEPHONE (OUTPATIENT)
Dept: FAMILY MEDICINE | Facility: CLINIC | Age: 75
End: 2020-01-27

## 2020-01-27 ENCOUNTER — AMBULATORY - HEALTHEAST (OUTPATIENT)
Dept: OTHER | Facility: CLINIC | Age: 75
End: 2020-01-27

## 2020-01-27 ENCOUNTER — OFFICE VISIT (OUTPATIENT)
Dept: FAMILY MEDICINE | Facility: CLINIC | Age: 75
End: 2020-01-27
Payer: COMMERCIAL

## 2020-01-27 VITALS
BODY MASS INDEX: 28.27 KG/M2 | HEART RATE: 91 BPM | WEIGHT: 197 LBS | DIASTOLIC BLOOD PRESSURE: 76 MMHG | SYSTOLIC BLOOD PRESSURE: 152 MMHG | OXYGEN SATURATION: 98 % | TEMPERATURE: 97.9 F

## 2020-01-27 DIAGNOSIS — J01.90 ACUTE SINUSITIS WITH SYMPTOMS > 10 DAYS: Primary | ICD-10-CM

## 2020-01-27 DIAGNOSIS — R03.0 ELEVATED BLOOD PRESSURE READING WITHOUT DIAGNOSIS OF HYPERTENSION: ICD-10-CM

## 2020-01-27 PROCEDURE — 99213 OFFICE O/P EST LOW 20 MIN: CPT | Performed by: FAMILY MEDICINE

## 2020-01-27 NOTE — TELEPHONE ENCOUNTER
Reason for Call:  Other     Detailed comments: Heather with [facility] calling stating patient is there in pharmacy right now and expecting an antibiotic from today's visit. TC does not see that any medications were sent and no documentation in office notes. Routing as high priority to address.     Phone Number Patient can be reached at: Other phone number:  [phone]    Best Time: any    Can we leave a detailed message on this number? YES    Call taken on 1/27/2020 at 11:49 AM by Emily Alfonso

## 2020-01-27 NOTE — PROGRESS NOTES
Subjective     Garrett Del Rio is a 74 year old male who presents to clinic today for the following health issues:    HPI   Acute Illness   Acute illness concerns: Hx of Sinuitis   Onset: 1.5 weeks now    Fever: no     Chills/Sweats: no     Headache (location?): no     Sinus Pressure:minor discomfort right side only    Conjunctivitis:  Right eye, some discharge annoying more than anything. Crusty on eyelashes    Ear Pain: no    Rhinorrhea: YES, noticed yellow color with some blood on the right nostril.    Congestion: no     Sore Throat: no dryness due to CPAP machine.      Cough: no    Wheeze: no     Decreased Appetite: no     Nausea: no     Vomiting: no     Diarrhea:  no     Dysuria/Freq.: no     Fatigue/Achiness: no     Sick/Strep Exposure:  no     Therapies Tried and outcome: nothing     Over the weekend and this morning his nasal discharge was bloody. He is having slight discomfort in his sinuses but it is not painful. He denies any chest pain or shortness of breath.     BP Readings from Last 3 Encounters:   01/27/20 (!) 152/76   12/06/19 138/78   10/31/19 132/70    Wt Readings from Last 3 Encounters:   01/27/20 89.4 kg (197 lb)   12/06/19 87.1 kg (192 lb)   10/31/19 88.5 kg (195 lb)         Reviewed and updated as needed this visit by Provider  Allergies  Meds  Problems  Med Hx  Surg Hx         Review of Systems   ROS COMP: Constitutional, HEENT, cardiovascular, pulmonary, gi and gu systems are negative, except as otherwise noted.     This document serves as a record of the services and decisions personally performed and made by Renay Masterson DO. It was created on her behalf by Magaly Delgado, a trained medical scribe. The creation of this document is based on the provider's statements to the medical scribe.  Magaly Delgado 10:13 AM January 27, 2020        Objective    BP (!) 152/76 (Cuff Size: Adult Regular)   Pulse 91   Temp 97.9  F (36.6  C) (Oral)   Wt 89.4 kg (197 lb)   SpO2 98%   BMI 28.27 kg/m    Body  mass index is 28.27 kg/m .     Physical Exam   GENERAL: healthy, alert and no distress  EYES: Eyes grossly normal to inspection, PERRL and conjunctivae and sclerae normal  HENT: ear canals and TM's normal, nose and mouth without ulcers or lesions, swollen turbinates  NECK: no adenopathy, no asymmetry, masses, or scars and thyroid normal to palpation  RESP: lungs clear to auscultation - no rales, rhonchi or wheezes  CV: regular rate and rhythm, normal S1 S2, no S3 or S4, no murmur, click or rub  PSYCH: mentation appears normal, affect normal/bright    Diagnostic Test Results:  Labs reviewed in Epic  No results found for this or any previous visit (from the past 24 hour(s)).        Assessment & Plan       ICD-10-CM    1. Acute sinusitis with symptoms > 10 days J01.90 amoxicillin-clavulanate (AUGMENTIN) 875-125 MG tablet   2. Elevated blood pressure reading without diagnosis of hypertension R03.0    Patient will start Augmentin for sinus infection and follow-up if he does not improve.     The patient was unhappy today due to having to wait to be seen until this week as he had called last week for an appointment.  I did contact management for him to speak with at the end of his office visit.  I believe his elevated blood pressure is related to him feeling angry about having to wait for the appointment.  He should follow-up with his primary to get this rechecked.    There are no Patient Instructions on file for this visit.    No follow-ups on file.    The information in this document, created by the medical scribe for me, accurately reflects the services I personally performed and the decisions made by me. I have reviewed and approved this document for accuracy prior to leaving the patient care area.  January 27, 2020 10:20 AM    Renay Masterson DO  [facility]

## 2020-02-23 ENCOUNTER — HEALTH MAINTENANCE LETTER (OUTPATIENT)
Age: 75
End: 2020-02-23

## 2020-08-06 ENCOUNTER — TRANSFERRED RECORDS (OUTPATIENT)
Dept: HEALTH INFORMATION MANAGEMENT | Facility: CLINIC | Age: 75
End: 2020-08-06

## 2020-12-06 ENCOUNTER — HEALTH MAINTENANCE LETTER (OUTPATIENT)
Age: 75
End: 2020-12-06

## 2021-02-26 DIAGNOSIS — K21.9 GASTROESOPHAGEAL REFLUX DISEASE WITHOUT ESOPHAGITIS: Chronic | ICD-10-CM

## 2021-03-01 NOTE — TELEPHONE ENCOUNTER
Medication is being filled for 1 time refill only due to:  Patient needs to be seen because it has been more than one year since last visit.     Geraldine High, RN, BSN, PHN  [facility]: [place]

## 2021-03-02 NOTE — TELEPHONE ENCOUNTER
Spoke to patient over the phone. Appointment for physical exam scheduled with Dr. Mary for 5/25/2021.  Shae Mendes CMA ([facility])

## 2021-04-11 ENCOUNTER — HEALTH MAINTENANCE LETTER (OUTPATIENT)
Age: 76
End: 2021-04-11

## 2021-04-16 DIAGNOSIS — Z13.6 CARDIOVASCULAR SCREENING; LDL GOAL LESS THAN 160: Chronic | ICD-10-CM

## 2021-04-16 DIAGNOSIS — R73.01 IMPAIRED FASTING GLUCOSE: ICD-10-CM

## 2021-04-16 LAB
ANION GAP SERPL CALCULATED.3IONS-SCNC: 8 MMOL/L (ref 3–14)
BUN SERPL-MCNC: 9 MG/DL (ref 7–30)
CALCIUM SERPL-MCNC: 9.8 MG/DL (ref 8.5–10.1)
CHLORIDE SERPL-SCNC: 103 MMOL/L (ref 94–109)
CHOLEST SERPL-MCNC: 157 MG/DL
CO2 SERPL-SCNC: 27 MMOL/L (ref 20–32)
CREAT SERPL-MCNC: 1.12 MG/DL (ref 0.66–1.25)
GFR SERPL CREATININE-BSD FRML MDRD: 64 ML/MIN/{1.73_M2}
GLUCOSE SERPL-MCNC: 172 MG/DL (ref 70–99)
HBA1C MFR BLD: 6.7 % (ref 0–5.6)
HDLC SERPL-MCNC: 25 MG/DL
LDLC SERPL CALC-MCNC: ABNORMAL MG/DL
LDLC SERPL DIRECT ASSAY-MCNC: 85 MG/DL
NONHDLC SERPL-MCNC: 132 MG/DL
POTASSIUM SERPL-SCNC: 4.6 MMOL/L (ref 3.4–5.3)
SODIUM SERPL-SCNC: 138 MMOL/L (ref 133–144)
TRIGL SERPL-MCNC: 449 MG/DL

## 2021-04-16 PROCEDURE — 80061 LIPID PANEL: CPT | Performed by: FAMILY MEDICINE

## 2021-04-16 PROCEDURE — 36415 COLL VENOUS BLD VENIPUNCTURE: CPT | Performed by: FAMILY MEDICINE

## 2021-04-16 PROCEDURE — 83721 ASSAY OF BLOOD LIPOPROTEIN: CPT | Performed by: FAMILY MEDICINE

## 2021-04-16 PROCEDURE — 80048 BASIC METABOLIC PNL TOTAL CA: CPT | Performed by: FAMILY MEDICINE

## 2021-04-16 PROCEDURE — 83036 HEMOGLOBIN GLYCOSYLATED A1C: CPT | Performed by: FAMILY MEDICINE

## 2021-05-25 ENCOUNTER — OFFICE VISIT (OUTPATIENT)
Dept: FAMILY MEDICINE | Facility: CLINIC | Age: 76
End: 2021-05-25
Payer: COMMERCIAL

## 2021-05-25 VITALS
HEIGHT: 69 IN | SYSTOLIC BLOOD PRESSURE: 140 MMHG | BODY MASS INDEX: 28.73 KG/M2 | DIASTOLIC BLOOD PRESSURE: 62 MMHG | TEMPERATURE: 98.2 F | WEIGHT: 194 LBS | HEART RATE: 78 BPM | OXYGEN SATURATION: 97 %

## 2021-05-25 DIAGNOSIS — F32.0 MILD MAJOR DEPRESSION (H): ICD-10-CM

## 2021-05-25 DIAGNOSIS — E11.65 TYPE 2 DIABETES MELLITUS WITH HYPERGLYCEMIA, WITHOUT LONG-TERM CURRENT USE OF INSULIN (H): ICD-10-CM

## 2021-05-25 DIAGNOSIS — L98.9 SKIN LESION: ICD-10-CM

## 2021-05-25 DIAGNOSIS — Z00.00 ENCOUNTER FOR MEDICARE ANNUAL WELLNESS EXAM: Primary | ICD-10-CM

## 2021-05-25 PROCEDURE — 86618 LYME DISEASE ANTIBODY: CPT | Performed by: FAMILY MEDICINE

## 2021-05-25 PROCEDURE — 36415 COLL VENOUS BLD VENIPUNCTURE: CPT | Performed by: FAMILY MEDICINE

## 2021-05-25 PROCEDURE — 99213 OFFICE O/P EST LOW 20 MIN: CPT | Mod: 25 | Performed by: FAMILY MEDICINE

## 2021-05-25 PROCEDURE — 99397 PER PM REEVAL EST PAT 65+ YR: CPT | Performed by: FAMILY MEDICINE

## 2021-05-25 RX ORDER — TRIAMCINOLONE ACETONIDE 1 MG/G
CREAM TOPICAL 2 TIMES DAILY
Qty: 15 G | Refills: 0 | Status: SHIPPED | OUTPATIENT
Start: 2021-05-25 | End: 2022-04-21

## 2021-05-25 ASSESSMENT — ENCOUNTER SYMPTOMS
NERVOUS/ANXIOUS: 0
NAUSEA: 0
HEMATURIA: 0
DYSURIA: 0
HEARTBURN: 0
SHORTNESS OF BREATH: 0
COUGH: 0
DIARRHEA: 0
HEADACHES: 0
CHILLS: 0
ARTHRALGIAS: 0
ABDOMINAL PAIN: 0
HEMATOCHEZIA: 0
JOINT SWELLING: 0
CONSTIPATION: 0
PALPITATIONS: 0
MYALGIAS: 0
DIZZINESS: 0
FREQUENCY: 0
FEVER: 0
EYE PAIN: 0
WEAKNESS: 0
PARESTHESIAS: 0
SORE THROAT: 0

## 2021-05-25 ASSESSMENT — ACTIVITIES OF DAILY LIVING (ADL): CURRENT_FUNCTION: NO ASSISTANCE NEEDED

## 2021-05-25 ASSESSMENT — PATIENT HEALTH QUESTIONNAIRE - PHQ9
SUM OF ALL RESPONSES TO PHQ QUESTIONS 1-9: 14
10. IF YOU CHECKED OFF ANY PROBLEMS, HOW DIFFICULT HAVE THESE PROBLEMS MADE IT FOR YOU TO DO YOUR WORK, TAKE CARE OF THINGS AT HOME, OR GET ALONG WITH OTHER PEOPLE: NOT DIFFICULT AT ALL
SUM OF ALL RESPONSES TO PHQ QUESTIONS 1-9: 14

## 2021-05-25 ASSESSMENT — PAIN SCALES - GENERAL: PAINLEVEL: NO PAIN (0)

## 2021-05-25 ASSESSMENT — MIFFLIN-ST. JEOR: SCORE: 1601.39

## 2021-05-25 NOTE — PATIENT INSTRUCTIONS
Patient Education   Personalized Prevention Plan  You are due for the preventive services outlined below.  Your care team is available to assist you in scheduling these services.  If you have already completed any of these items, please share that information with your care team to update in your medical record.  Health Maintenance Due   Topic Date Due     ANNUAL REVIEW OF HM ORDERS  Never done                                I would advise using steroid cream twice daily to the spot on your right buttock for the next 1-2 weeks to help relieve the redness and itching.

## 2021-05-25 NOTE — PROGRESS NOTES
"SUBJECTIVE:   Garrett Del Rio is a 75 year old male who presents for Preventive Visit.      Patient has been advised of split billing requirements and indicates understanding: Yes   Are you in the first 12 months of your Medicare coverage?  No    Healthy Habits:     In general, how would you rate your overall health?  Good    Frequency of exercise:  None    Do you usually eat at least 4 servings of fruit and vegetables a day, include whole grains    & fiber and avoid regularly eating high fat or \"junk\" foods?  No    Taking medications regularly:  Yes    Medication side effects:  None    Ability to successfully perform activities of daily living:  No assistance needed    Home Safety:  No safety concerns identified    Hearing Impairment:  No hearing concerns    In the past 6 months, have you been bothered by leaking of urine?  No    In general, how would you rate your overall mental or emotional health?  Poor      PHQ-2 Total Score: 6    Additional concerns today:  No    Do you feel safe in your environment? Yes    Have you ever done Advance Care Planning? (For example, a Health Directive, POLST, or a discussion with a medical provider or your loved ones about your wishes): Yes, advance care planning is on file.       Fall risk  Fallen 2 or more times in the past year?: No  Any fall with injury in the past year?: No    Cognitive Screening   1) Repeat 3 items (Leader, Season, Table)    2) Clock draw: NORMAL  3) 3 item recall: Recalls 2 objects   Results: NORMAL clock, 1-2 items recalled: COGNITIVE IMPAIRMENT LESS LIKELY    Mini-CogTM Copyright RHODA Herrera. Licensed by the author for use in [facility]; reprinted with permission (stew@.[facility]). All rights reserved.      Do you have sleep apnea, excessive snoring or daytime drowsiness?: yes    Reviewed and updated as needed this visit by clinical staff  Tobacco  Allergies  Meds   Med Hx  Surg Hx  Fam Hx  Soc Hx        Reviewed and updated as needed this " visit by Provider                Social History     Tobacco Use     Smoking status: Former Smoker     Packs/day: 1.00     Years: 35.00     Pack years: 35.00     Types: Cigarettes, Cigars, Pipe     Start date: 1963     Quit date: 10/13/1998     Years since quittin.6     Smokeless tobacco: Never Used   Substance Use Topics     Alcohol use: Yes     Comment: about a beer a week      If you drink alcohol do you typically have >3 drinks per day or >7 drinks per week? No    Alcohol Use 2021   Prescreen: >3 drinks/day or >7 drinks/week? No   Prescreen: >3 drinks/day or >7 drinks/week? -           Concern - Wood Tick bite   Onset: around May 16th     Description:   Red with a light center on butt cheek     Intensity: mild    Progression of Symptoms:  worsening    Accompanying Signs & Symptoms:  Itchy   Was a wood tick per patient.  Lesion has not enlarged. But is itchy    Frustrated with negativity in the world right now.    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      Keeps busy with yardwork, laundry.    Has not been exercising as much.  Hasn't been golfing or bowling like he used to.    Current providers sharing in care for this patient include:   Patient Care Team:  Frannie Talavera MD as PCP - General  Charlie Wooten MD as MD (Orthopedics)  Addy Juan MD as MD (Urology)  Frannie Talavera MD as Assigned PCP    The following health maintenance items are reviewed in Epic and correct as of today:  Health Maintenance Due   Topic Date Due     ANNUAL REVIEW OF HM ORDERS  Never done     DEPRESSION ACTION PLAN  Never done     PHQ-9  2020     FALL RISK ASSESSMENT  10/31/2020     Labs reviewed in EPIC      Review of Systems   Constitutional: Negative for chills and fever.   HENT: Negative for congestion, ear pain, hearing loss and sore throat.    Eyes: Negative for pain and visual disturbance.   Respiratory: Negative for cough and shortness of breath.    Cardiovascular: Negative  "for chest pain, palpitations and peripheral edema.   Gastrointestinal: Negative for abdominal pain, constipation, diarrhea, heartburn, hematochezia and nausea.   Genitourinary: Negative for discharge, dysuria, frequency, genital sores, hematuria, impotence and urgency.   Musculoskeletal: Negative for arthralgias, joint swelling and myalgias.   Skin: Negative for rash.   Neurological: Negative for dizziness, weakness, headaches and paresthesias.   Psychiatric/Behavioral: Positive for mood changes. The patient is not nervous/anxious.          OBJECTIVE:   BP (!) 140/62 (BP Location: Right arm, Patient Position: Sitting, Cuff Size: Adult Regular)   Pulse 78   Temp 98.2  F (36.8  C) (Oral)   Ht 1.746 m (5' 8.75\")   Wt 88 kg (194 lb)   SpO2 97%   BMI 28.86 kg/m   Estimated body mass index is 28.86 kg/m  as calculated from the following:    Height as of this encounter: 1.746 m (5' 8.75\").    Weight as of this encounter: 88 kg (194 lb).  Physical Exam  GENERAL: healthy, alert and no distress  RESP: lungs clear to auscultation - no rales, rhonchi or wheezes  CV: regular rate and rhythm, normal S1 S2, no S3 or S4, no murmur, click or rub, no peripheral edema and peripheral pulses strong  SKIN: right lower buttock.  1 cm red lesion with target clearing, no warmth or drainage.    PSYCH: mentation appears normal and affect flat    Diagnostic Test Results:  Labs reviewed in Epic    ASSESSMENT / PLAN:   (Z00.00) Encounter for Medicare annual wellness exam  (primary encounter diagnosis)  Comment:   Plan:  Other health care maintenance up to date per chart or patient report.      (F32.0) Mild major depression (H)  Comment: mood impact by events in the world.    Plan: declines medication or therapy support at this time.   therapeutic listening provided    (E11.65) Type 2 diabetes mellitus with hyperglycemia, without long-term current use of insulin (H)  Comment: new diagnosis, based on elevated blood sugar and elevated " "a1c  Plan: declines glucometer and daily testing.  Discussed diet and exercise strategies.  Follow up in 3-4 months.    (L98.9) Skin lesion  Comment: right buttock.  14 days ago, not a classic lyme target lesion, if lyme neg suspect possible small inflammatory response in the skin  Plan: Lyme Disease Shu with reflex to WB Serum,         triamcinolone (KENALOG) 0.1 % external cream        adjust therapy based on labs        Patient has been advised of split billing requirements and indicates understanding: Yes  COUNSELING:  Reviewed preventive health counseling, as reflected in patient instructions    Estimated body mass index is 28.86 kg/m  as calculated from the following:    Height as of this encounter: 1.746 m (5' 8.75\").    Weight as of this encounter: 88 kg (194 lb).        He reports that he quit smoking about 22 years ago. His smoking use included cigarettes, cigars, and pipe. He started smoking about 58 years ago. He has a 35.00 pack-year smoking history. He has never used smokeless tobacco.      Appropriate preventive services were discussed with this patient, including applicable screening as appropriate for cardiovascular disease, diabetes, osteopenia/osteoporosis, and glaucoma.  As appropriate for age/gender, discussed screening for colorectal cancer, prostate cancer, breast cancer, and cervical cancer. Checklist reviewing preventive services available has been given to the patient.    Reviewed patients plan of care and provided an AVS. The Intermediate Care Plan ( asthma action plan, low back pain action plan, and migraine action plan) for Garrett meets the Care Plan requirement. This Care Plan has been established and reviewed with the Patient.    Counseling Resources:  ATP IV Guidelines  Pooled Cohorts Equation Calculator  Breast Cancer Risk Calculator  Breast Cancer: Medication to Reduce Risk  FRAX Risk Assessment  ICSI Preventive Guidelines  Dietary Guidelines for Americans, 2010  USDA's MyPlate  ASA " Prophylaxis  Lung CA Screening    Frannie Mary MD  [facility]    Identified Health Risks:

## 2021-05-26 LAB — B BURGDOR IGG+IGM SER QL: 0.06 (ref 0–0.89)

## 2021-05-26 ASSESSMENT — PATIENT HEALTH QUESTIONNAIRE - PHQ9: SUM OF ALL RESPONSES TO PHQ QUESTIONS 1-9: 14

## 2021-06-03 DIAGNOSIS — K21.9 GASTROESOPHAGEAL REFLUX DISEASE WITHOUT ESOPHAGITIS: Chronic | ICD-10-CM

## 2021-06-03 NOTE — TELEPHONE ENCOUNTER
Prescription approved per [facility] Refill Protocol.    Aj Montana, RN, BSN, PHN  [facility]

## 2021-06-29 ENCOUNTER — TRANSFERRED RECORDS (OUTPATIENT)
Dept: MULTI SPECIALTY CLINIC | Facility: CLINIC | Age: 76
End: 2021-06-29

## 2021-06-29 LAB — RETINOPATHY: NORMAL

## 2021-07-02 ENCOUNTER — TRANSFERRED RECORDS (OUTPATIENT)
Dept: HEALTH INFORMATION MANAGEMENT | Facility: CLINIC | Age: 76
End: 2021-07-02

## 2021-07-07 ENCOUNTER — OFFICE VISIT (OUTPATIENT)
Dept: FAMILY MEDICINE | Facility: CLINIC | Age: 76
End: 2021-07-07
Payer: COMMERCIAL

## 2021-07-07 VITALS
HEART RATE: 82 BPM | SYSTOLIC BLOOD PRESSURE: 134 MMHG | RESPIRATION RATE: 20 BRPM | HEIGHT: 68 IN | DIASTOLIC BLOOD PRESSURE: 74 MMHG | BODY MASS INDEX: 29.1 KG/M2 | OXYGEN SATURATION: 97 % | WEIGHT: 192 LBS | TEMPERATURE: 98.3 F

## 2021-07-07 DIAGNOSIS — M77.11 LATERAL EPICONDYLITIS OF RIGHT ELBOW: Primary | ICD-10-CM

## 2021-07-07 PROCEDURE — 99213 OFFICE O/P EST LOW 20 MIN: CPT | Performed by: FAMILY MEDICINE

## 2021-07-07 ASSESSMENT — PAIN SCALES - GENERAL: PAINLEVEL: MODERATE PAIN (5)

## 2021-07-07 ASSESSMENT — MIFFLIN-ST. JEOR: SCORE: 1580.41

## 2021-07-07 NOTE — PROGRESS NOTES
Assessment & Plan     Lateral epicondylitis of right elbow  Advised No heavy Lifting  Advised PT  Aleve Otc  Follow up 1 month if not bettet  - Wrist/Arm/Hand Supplies Order  - [facility] PT [facility] REFERRAL; Future  Return in about 1 month (around 8/7/2021) for recheck/Please schedule appointment.    Brenda Mahan MD  [facility] BERNADETTE Tucker is a 75 year old who presents for the following health issues {    HPI     Musculoskeletal problem/pain  Onset/Duration: June 12th  Description  Location: wrist and elbow - right-strained it a month ago  Was splitting wood in his cabin and Lifting wood  Pain lateral aspect elbow  Joint Swelling: no  Redness: no  Pain: YES  Warmth: no  Intensity:  moderate  Progression of Symptoms:  worsening and when moving that arm  Accompanying signs and symptoms:   Fevers: no    History  Trauma to the area: no  Recent illness:  no  Previous similar problem: YES  Previous evaluation:  YES  Precipitating or alleviating factors:  Aggravating factors include: using rt arm and hand  Therapies tried and outcome: heat and ice didn't help  Review of Systems   Rest of the pertinent  ROS is Negative except see above and Problem list [stable]          Objective    /74   There is no height or weight on file to calculate BMI.  Physical Exam   GENERAL: healthy, alert and no distress  RESP: lungs clear to auscultation - no rales, rhonchi or wheezes  CV: regular rate and rhythm, normal S1 S2, no S3 or S4, no murmur, click or rub, no peripheral edema and peripheral pulses strong  MS: no gross musculoskeletal defects noted, no edema  SKIN: no suspicious lesions or rashes  NEURO: Normal strength and tone, mentation intact and speech normal  Tenderness lateral epicondyle Right elbow  No swelling  Mild tenderness Wrist R  No swelling      DME (Durable Medical Equipment) Orders and Documentation  Orders Placed This Encounter   Procedures     Wrist/Arm/Hand Supplies Order      The  patient was assessed and it was determined the patient is in need of the following listed DME Supplies/Equipment. Please complete supporting documentation below to demonstrate medical necessity.      Wrist/Arm/Hand Bracing Supplies Documentation  Patient requires the use of the ordered bracing device due to following medical need/condition: as above

## 2021-07-07 NOTE — PATIENT INSTRUCTIONS
Patient Education     Treating Tennis Elbow    Your treatment will depend on how inflamed your tendon is. The goal is to ease your symptoms and help you regain full use of your elbow.  Rest and medicine  Wear a tennis elbow splint to let the inflamed tendon rest and heal. It must be worn correctly. It should be placed down the arm past the painful area of the elbow. It can make symptoms worse if it's directly over the inflamed tendon. Take stress off the tendon by using your other hand or changing your . Take NSAIDs (nonsteroidal anti-inflammatory drugs) and use ice to ease pain and swelling.  Exercises and therapy  Your healthcare provider may give you an exercise program. He or she may send you to a physical therapist. That expert will teach you how to gently stretch and strengthen the muscles around your elbow.  Anti-inflammatory shots  Your healthcare provider may give you shots of an anti-inflammatory medicine such as cortisone. This helps ease swelling. You may have more pain at first. But your elbow should feel better in a few days.  If surgery is needed  If your symptoms go on for a long time, or other treatments don t work, your healthcare provider may recommend surgery. Surgery repairs the inflamed tendon.  V[employer]ius last reviewed this educational content on 1/1/2018 2000-2021 [employer]. All rights reserved. This information is not intended as a substitute for professional medical care. Always follow your healthcare professional's instructions.           Patient Education     Tennis Elbow  Muscles connect to bones by thick, fibrous cords (tendons). When the muscles are overused by repeated motion, the tendons may become inflamed and painful. This condition is called tendonitis.   Tennis elbow (lateral epicondylitis) is a form of tendonitis. It occurs when the forearm muscles are used again and again in a twisting motion. Pain from tennis elbow occurs mainly on the outside of the  elbow. But the pain can spread into the forearm and wrist. Your elbow may also be swollen and tender to the touch.   The pain may get worse when you move your arm or do certain activities. Bending your wrist back, shaking hands, or turning a doorknob may cause pain. The pain often gets worse after several weeks or months. Sometimes you may feel pain when your arm is still.   Tennis players who use a backhand stroke with poor technique are more likely to get tennis elbow. But playing tennis is only one cause of tennis elbow. Other common activities that can cause it include:     Hammering    Painting    Raking  Besides tennis players, people at risk include , gardeners, musicians, and dentists. Sometimes people get tennis elbow without doing anything that would cause the injury.   Treatment includes resting the arm and taking anti-inflammatory medicines. Special splints can help ease symptoms. Symptoms should get better after 4 to 6 weeks of rest. You may need steroid injections if resting and using a splint don t help. After the pain is relieved, you should change your activities so the symptoms don t return. You may need physical therapy. It may include stretching, range-of-motion, and strengthening exercises. These treatments help most cases. You may need surgery if your symptoms continue for 6 months despite treatment.   Home care  Follow these guidelines when caring for yourself at home:    Rest your elbow as needed. Protect it from movement that causes pain. You may be told to use a forearm splint at night to ease symptoms in the morning. Your healthcare provider may recommend a special wrap or splint to compress the muscles of the forearm. This can ease pain during daytime activities. As your symptoms get better, start to move your elbow more.    Put an ice pack on the injured area. Do this for 20 minutes every 1 to 2 hours the first day for pain relief. You can make an ice pack by wrapping a plastic  bag of ice cubes in a thin towel. Continue using the ice pack 3 to 4 times a day for the next several days. Then use the ice pack as needed to ease pain and swelling.    You may use acetaminophen or ibuprofen to control pain, unless another pain medicine was prescribed. If you have chronic liver or kidney disease, talk with your healthcare provider before using these medicines. Also talk with your provider if you ve had a stomach ulcer or gastrointestinal bleeding.    After your elbow heals, avoid the motion that caused your pain. Or learn to move in a way that causes less stress on the tendon. Using a forearm wrap may keep tennis elbow from happening again.    A tennis elbow strap may ease pain and keep you from further injury when you start playing tennis again. You can also lower your risk for injury by warming up before you play and cooling down afterward. You should also use the right equipment. For instance, make sure your racquet has the right  and is the right size for you.  Follow-up care  Follow up with your healthcare provider as advised, if your symptoms don t get better after 2 to 3 weeks of treatment.   When to seek medical advice  Call your healthcare provider right away if any of these occur:    Redness over the painful area    Pain, stiffness,  or swelling at the elbow gets worse    Any numbness or tingling in your arm, hands, or fingers    Unexplained fever over 100.4 F (38 C)    Chills  Vinicius last reviewed this educational content on 12/1/2019 2000-2021 [employer]. All rights reserved. This information is not intended as a substitute for professional medical care. Always follow your healthcare professional's instructions.

## 2021-07-31 ENCOUNTER — HEALTH MAINTENANCE LETTER (OUTPATIENT)
Age: 76
End: 2021-07-31

## 2021-08-05 ENCOUNTER — OFFICE VISIT (OUTPATIENT)
Dept: FAMILY MEDICINE | Facility: CLINIC | Age: 76
End: 2021-08-05
Payer: COMMERCIAL

## 2021-08-05 VITALS
HEART RATE: 76 BPM | OXYGEN SATURATION: 98 % | DIASTOLIC BLOOD PRESSURE: 76 MMHG | WEIGHT: 186 LBS | BODY MASS INDEX: 28.28 KG/M2 | SYSTOLIC BLOOD PRESSURE: 152 MMHG | TEMPERATURE: 98.4 F

## 2021-08-05 DIAGNOSIS — M77.11 LATERAL EPICONDYLITIS OF RIGHT ELBOW: Primary | ICD-10-CM

## 2021-08-05 DIAGNOSIS — E11.65 TYPE 2 DIABETES MELLITUS WITH HYPERGLYCEMIA, WITHOUT LONG-TERM CURRENT USE OF INSULIN (H): ICD-10-CM

## 2021-08-05 LAB
HBA1C MFR BLD: 6.6 % (ref 0–5.6)
HOLD SPECIMEN: NORMAL
HOLD SPECIMEN: NORMAL

## 2021-08-05 PROCEDURE — 36415 COLL VENOUS BLD VENIPUNCTURE: CPT | Performed by: FAMILY MEDICINE

## 2021-08-05 PROCEDURE — 99213 OFFICE O/P EST LOW 20 MIN: CPT | Performed by: FAMILY MEDICINE

## 2021-08-05 PROCEDURE — 83036 HEMOGLOBIN GLYCOSYLATED A1C: CPT | Performed by: FAMILY MEDICINE

## 2021-08-05 PROCEDURE — 82043 UR ALBUMIN QUANTITATIVE: CPT | Performed by: FAMILY MEDICINE

## 2021-08-05 NOTE — PATIENT INSTRUCTIONS
Patient Education     Understanding Lateral Epicondylitis     Tendons are strong bands of tissue that connect muscles to bones. Lateral epicondylitis affects the tendons that connect muscles in the forearm to the lateral epicondyle. This is the bony knob on the outer side of the elbow. The condition occurs if the extensor tendons of the wrist become painful and swollen (irritated). This can cause pain in the elbow, forearm, and wrist. Because the condition is sometimes caused by playing tennis, it's also known as  tennis elbow.     How to say it  LA-tuhr-sandra fv-ry-SDE-duh-LY-tis   What causes lateral epicondylitis?  The condition most often occurs because of overuse. This can be from any activity that repeatedly puts stress on the forearm extensor muscles or tendons and wrist. For instance, playing tennis, lifting weights, cutting meat, painting, and typing can all cause the condition. Wear and tear of the tendons from aging or an injury to the tendons can also cause the condition.   Symptoms of lateral epicondylitis  The most common symptom is pain. You may feel it on the outer side of the elbow and down the back of the forearm. It may be worse when moving or using the elbow, forearm, or wrist. You may also feel pain when gripping or lifting things.   Treatment for lateral epicondylitis  Treatments may include:    Resting the elbow, forearm, and wrist. You ll need to avoid movements that can make your symptoms worse. You also may need to avoid certain sports and types of work for a time. This helps relieve symptoms and prevent further damage to the tendons.    Changing the action that caused the problem. For instance, if the tendons were damaged from playing tennis, it may help to change your playing technique or use different equipment. This helps prevent further damage to the tendons.    Using cold packs. Putting an ice pack on the injured area can help reduce pain and swelling.    Taking pain medicines. Taking  prescription or over-the-counter pain medicines may help reduce pain and swelling.      Wearing a brace. This helps reduce strain on the muscles and tendons in the forearm, which may relieve symptoms. It's very important to wear the brace properly.    Doing exercises and physical therapy. These help improve strength and range of motion in the elbow, forearm, and wrist.    Getting shots of medicine into the injured area. These may help relieve symptoms for a time.    Having surgery. This may be an option if other treatments fail to relieve symptoms. In many cases, the surgeon removes the damaged tissue.  Possible complications of lateral epicondylitis  If the tendons involved don t heal properly, symptoms may return or get worse. To help prevent this, follow your treatment plan as directed.   When to call your healthcare provider  Call your healthcare provider right away if you have any of these:    Fever of 100.4 F (38 C) or higher, or as directed by your provider    Chills    Redness, swelling, or warmth in the elbow or forearm that gets worse    Symptoms that don t get better with treatment, or get worse    New symptoms  Vinicius last reviewed this educational content on 6/1/2019 2000-2021 [employer]. All rights reserved. This information is not intended as a substitute for professional medical care. Always follow your healthcare professional's instructions.

## 2021-08-06 ENCOUNTER — MYC MEDICAL ADVICE (OUTPATIENT)
Dept: FAMILY MEDICINE | Facility: CLINIC | Age: 76
End: 2021-08-06

## 2021-08-06 LAB
CREAT UR-MCNC: 146 MG/DL
MICROALBUMIN UR-MCNC: 25 MG/L
MICROALBUMIN/CREAT UR: 17.12 MG/G CR (ref 0–17)

## 2021-08-09 NOTE — TELEPHONE ENCOUNTER
Routing to Dr. Jiménez[facility]    Pt calling in regards to albumin quantitative urine lab test done 8/5/21    Gabrielle Elliott RN

## 2021-08-17 ENCOUNTER — OFFICE VISIT (OUTPATIENT)
Dept: FAMILY MEDICINE | Facility: CLINIC | Age: 76
End: 2021-08-17
Payer: COMMERCIAL

## 2021-08-17 VITALS
HEART RATE: 76 BPM | WEIGHT: 190.2 LBS | SYSTOLIC BLOOD PRESSURE: 132 MMHG | BODY MASS INDEX: 28.82 KG/M2 | TEMPERATURE: 98.1 F | OXYGEN SATURATION: 97 % | HEIGHT: 68 IN | DIASTOLIC BLOOD PRESSURE: 66 MMHG

## 2021-08-17 DIAGNOSIS — E11.65 TYPE 2 DIABETES MELLITUS WITH HYPERGLYCEMIA, WITHOUT LONG-TERM CURRENT USE OF INSULIN (H): Primary | ICD-10-CM

## 2021-08-17 DIAGNOSIS — M77.11 LATERAL EPICONDYLITIS OF RIGHT ELBOW: ICD-10-CM

## 2021-08-17 PROCEDURE — 99214 OFFICE O/P EST MOD 30 MIN: CPT | Performed by: FAMILY MEDICINE

## 2021-08-17 ASSESSMENT — MIFFLIN-ST. JEOR: SCORE: 1572.24

## 2021-08-17 NOTE — PROGRESS NOTES
Assessment & Plan     (E11.65) Type 2 diabetes mellitus with hyperglycemia, without long-term current use of insulin (H)  (primary encounter diagnosis)  Comment: stable  Plan: continue to monitor diet and exercise     (M77.11) Lateral epicondylitis of right elbow  Comment: improving  Plan: continue current treatment plan      Return in about 6 months (around 2/17/2022).    Frannie Mary MD  [facility]    Sarwat Tucker is a 75 year old who presents for the following health issues  accompanied by his spouse:    Reviewed recent labs.   right elbow  - voltaren cream seems to help.  And right wrist brace is also helping.  Is doing acupuncture.      Right ear - sometimes notices noise in his ear when he moves his jaw.  No pain.      HPI     Diabetes Follow-up      How often are you checking your blood sugar? Not at all    What concerns do you have today about your diabetes? None     Do you have any of these symptoms? (Select all that apply)  Blurry vision once in a while    Have you had a diabetic eye exam in the last 12 months? Yes- Date of last eye exam: 06/29/21,  Location: [facility]    BP Readings from Last 2 Encounters:   08/17/21 132/66   08/05/21 (!) 152/76     Hemoglobin A1C (%)   Date Value   08/05/2021 6.6 (H)   04/16/2021 6.7 (H)   10/31/2019 5.8 (H)     LDL Cholesterol Calculated (mg/dL)   Date Value   04/16/2021     Cannot estimate LDL when triglyceride exceeds 400 mg/dL   10/07/2019 62     LDL Cholesterol Direct (mg/dL)   Date Value   04/16/2021 85                 How many servings of fruits and vegetables do you eat daily?  0-1    On average, how many sweetened beverages do you drink each day (Examples: soda, juice, sweet tea, etc.  Do NOT count diet or artificially sweetened beverages)?  0    How many days per week do you exercise enough to make your heart beat faster? 3 or less    How many minutes a day do you exercise enough to make your heart beat  "faster? 9 or less    How many days per week do you miss taking your medication? 0    Crackling noise in the right ear when moving the jaw.         Review of Systems   Constitutional, HEENT, cardiovascular, pulmonary, gi and gu systems are negative, except as otherwise noted.      Objective    /66   Pulse 76   Temp 98.1  F (36.7  C) (Oral)   Ht 1.727 m (5' 8\")   Wt 86.3 kg (190 lb 3.2 oz)   SpO2 97%   BMI 28.92 kg/m    Body mass index is 28.92 kg/m .  Physical Exam   GENERAL: healthy, alert and no distress  MS: no gross musculoskeletal defects noted, no edema  PSYCH: mentation appears normal, affect normal/bright                "

## 2021-10-19 PROBLEM — F32.9 MAJOR DEPRESSION: Status: ACTIVE | Noted: 2019-12-06

## 2021-11-20 ENCOUNTER — HEALTH MAINTENANCE LETTER (OUTPATIENT)
Age: 76
End: 2021-11-20

## 2021-12-21 ENCOUNTER — TELEPHONE (OUTPATIENT)
Dept: FAMILY MEDICINE | Facility: CLINIC | Age: 76
End: 2021-12-21
Payer: COMMERCIAL

## 2021-12-21 NOTE — TELEPHONE ENCOUNTER
RN spoke with patient regarding his wife.    Patient the had questions regardinglabs being needed as he had been notified thru My Chart he was over due.    RN reviewed chart.    Patient is due for follow up with Dr. Talavera 2/22.    Patient currently overdue for A1C and PHQ9.    RN reviewed with patient and advised to schedule follow up in February.    Patient verbalized understanding.    Aj Montana RN, BSN, PHN  [facility]

## 2022-02-25 DIAGNOSIS — K21.9 GASTROESOPHAGEAL REFLUX DISEASE WITHOUT ESOPHAGITIS: Chronic | ICD-10-CM

## 2022-02-25 NOTE — TELEPHONE ENCOUNTER
Routing to nadja hidalgo.    Please call patient to schedule appointment.  Patient was due for follow up 2/2022.    Aj Montana, RN, BSN, PHN  [facility]

## 2022-02-26 DIAGNOSIS — K21.9 GASTROESOPHAGEAL REFLUX DISEASE WITHOUT ESOPHAGITIS: Chronic | ICD-10-CM

## 2022-02-28 NOTE — TELEPHONE ENCOUNTER
Called and scheduled patient with Dr. Talavera 04/21/22 (1st available, prefers to see Dr. Talavera) can refills be sent until patient able to be seen?    Tenisha DUONG CMA

## 2022-03-12 ENCOUNTER — HEALTH MAINTENANCE LETTER (OUTPATIENT)
Age: 77
End: 2022-03-12

## 2022-04-21 ENCOUNTER — TELEPHONE (OUTPATIENT)
Dept: FAMILY MEDICINE | Facility: CLINIC | Age: 77
End: 2022-04-21

## 2022-04-21 ENCOUNTER — OFFICE VISIT (OUTPATIENT)
Dept: FAMILY MEDICINE | Facility: CLINIC | Age: 77
End: 2022-04-21
Payer: COMMERCIAL

## 2022-04-21 VITALS
WEIGHT: 185 LBS | HEART RATE: 80 BPM | TEMPERATURE: 98.2 F | SYSTOLIC BLOOD PRESSURE: 142 MMHG | RESPIRATION RATE: 20 BRPM | HEIGHT: 69 IN | BODY MASS INDEX: 27.4 KG/M2 | OXYGEN SATURATION: 99 % | DIASTOLIC BLOOD PRESSURE: 70 MMHG

## 2022-04-21 DIAGNOSIS — E11.65 TYPE 2 DIABETES MELLITUS WITH HYPERGLYCEMIA, WITHOUT LONG-TERM CURRENT USE OF INSULIN (H): ICD-10-CM

## 2022-04-21 DIAGNOSIS — Z13.6 CARDIOVASCULAR SCREENING; LDL GOAL LESS THAN 160: ICD-10-CM

## 2022-04-21 DIAGNOSIS — Z00.00 ENCOUNTER FOR MEDICARE ANNUAL WELLNESS EXAM: Primary | ICD-10-CM

## 2022-04-21 DIAGNOSIS — F32.0 MILD MAJOR DEPRESSION (H): ICD-10-CM

## 2022-04-21 DIAGNOSIS — K21.9 GASTROESOPHAGEAL REFLUX DISEASE WITHOUT ESOPHAGITIS: Chronic | ICD-10-CM

## 2022-04-21 PROBLEM — F32.9 MAJOR DEPRESSION: Status: RESOLVED | Noted: 2019-12-06 | Resolved: 2022-04-21

## 2022-04-21 PROBLEM — R73.01 IMPAIRED FASTING GLUCOSE: Status: RESOLVED | Noted: 2019-12-06 | Resolved: 2022-04-21

## 2022-04-21 LAB
HBA1C MFR BLD: 6.7 % (ref 0–5.6)
HOLD SPECIMEN: NORMAL

## 2022-04-21 PROCEDURE — 99397 PER PM REEVAL EST PAT 65+ YR: CPT | Performed by: FAMILY MEDICINE

## 2022-04-21 PROCEDURE — 83721 ASSAY OF BLOOD LIPOPROTEIN: CPT | Mod: 59 | Performed by: FAMILY MEDICINE

## 2022-04-21 PROCEDURE — 99214 OFFICE O/P EST MOD 30 MIN: CPT | Mod: 25 | Performed by: FAMILY MEDICINE

## 2022-04-21 PROCEDURE — 80061 LIPID PANEL: CPT | Performed by: FAMILY MEDICINE

## 2022-04-21 PROCEDURE — 83036 HEMOGLOBIN GLYCOSYLATED A1C: CPT | Performed by: FAMILY MEDICINE

## 2022-04-21 PROCEDURE — 80048 BASIC METABOLIC PNL TOTAL CA: CPT | Performed by: FAMILY MEDICINE

## 2022-04-21 PROCEDURE — 36415 COLL VENOUS BLD VENIPUNCTURE: CPT | Performed by: FAMILY MEDICINE

## 2022-04-21 ASSESSMENT — ENCOUNTER SYMPTOMS
DIZZINESS: 0
ARTHRALGIAS: 1
SHORTNESS OF BREATH: 0
HEADACHES: 0
NAUSEA: 0
HEARTBURN: 0
CONSTIPATION: 0
WEAKNESS: 0
JOINT SWELLING: 0
CHILLS: 0
COUGH: 0
EYE PAIN: 0
PALPITATIONS: 0
FEVER: 0
SORE THROAT: 0
PARESTHESIAS: 0
MYALGIAS: 0
ABDOMINAL PAIN: 0
HEMATURIA: 0
DIARRHEA: 0
DYSURIA: 0
FREQUENCY: 0
HEMATOCHEZIA: 0
NERVOUS/ANXIOUS: 0

## 2022-04-21 ASSESSMENT — ANXIETY QUESTIONNAIRES
GAD7 TOTAL SCORE: 3
GAD7 TOTAL SCORE: 3
6. BECOMING EASILY ANNOYED OR IRRITABLE: NEARLY EVERY DAY
2. NOT BEING ABLE TO STOP OR CONTROL WORRYING: NOT AT ALL
GAD7 TOTAL SCORE: 3
3. WORRYING TOO MUCH ABOUT DIFFERENT THINGS: NOT AT ALL
4. TROUBLE RELAXING: NOT AT ALL
7. FEELING AFRAID AS IF SOMETHING AWFUL MIGHT HAPPEN: NOT AT ALL
5. BEING SO RESTLESS THAT IT IS HARD TO SIT STILL: NOT AT ALL
7. FEELING AFRAID AS IF SOMETHING AWFUL MIGHT HAPPEN: NOT AT ALL
1. FEELING NERVOUS, ANXIOUS, OR ON EDGE: NOT AT ALL

## 2022-04-21 ASSESSMENT — ACTIVITIES OF DAILY LIVING (ADL): CURRENT_FUNCTION: NO ASSISTANCE NEEDED

## 2022-04-21 ASSESSMENT — PATIENT HEALTH QUESTIONNAIRE - PHQ9
SUM OF ALL RESPONSES TO PHQ QUESTIONS 1-9: 12
SUM OF ALL RESPONSES TO PHQ QUESTIONS 1-9: 12
10. IF YOU CHECKED OFF ANY PROBLEMS, HOW DIFFICULT HAVE THESE PROBLEMS MADE IT FOR YOU TO DO YOUR WORK, TAKE CARE OF THINGS AT HOME, OR GET ALONG WITH OTHER PEOPLE: NOT DIFFICULT AT ALL

## 2022-04-21 ASSESSMENT — PAIN SCALES - GENERAL: PAINLEVEL: NO PAIN (0)

## 2022-04-21 NOTE — TELEPHONE ENCOUNTER
Please call Garrett around the week of June 1st to see how home blood pressures are doing.  If mostly above 140/90 please assist with scheduling follow up with me

## 2022-04-21 NOTE — PATIENT INSTRUCTIONS
Patient Education   Personalized Prevention Plan  You are due for the preventive services outlined below.  Your care team is available to assist you in scheduling these services.  If you have already completed any of these items, please share that information with your care team to update in your medical record.  Health Maintenance Due   Topic Date Due     Diabetic Foot Exam  Never done     ANNUAL REVIEW OF HM ORDERS  Never done     Basic Metabolic Panel  10/16/2021     Depression Assessment  11/25/2021     Cholesterol Lab  04/16/2022

## 2022-04-21 NOTE — PROGRESS NOTES
"SUBJECTIVE:   Garrett Del Rio is a 76 year old male who presents for Preventive Visit.    Patient has been advised of split billing requirements and indicates understanding: Yes  Are you in the first 12 months of your Medicare coverage?  No    Healthy Habits:     In general, how would you rate your overall health?  Good    Frequency of exercise:  None    Do you usually eat at least 4 servings of fruit and vegetables a day, include whole grains    & fiber and avoid regularly eating high fat or \"junk\" foods?  No    Taking medications regularly:  Yes    Medication side effects:  None    Ability to successfully perform activities of daily living:  No assistance needed    Home Safety:  No safety concerns identified    Hearing Impairment:  No hearing concerns    In the past 6 months, have you been bothered by leaking of urine? Yes    In general, how would you rate your overall mental or emotional health?  Poor      PHQ-2 Total Score: 6    Additional concerns today:  No    Right tennis elbow flared due to wood chopping.  That is better. Now right wrist is flared.  But has had intermittent pain in right wrist for 30+ years.  Feels comfortable managing this pain.    Left knee is sore off and on due to weather.  Injections didn't help most recently.  Not limiting him in his activities.    Sneezing off and on past two months.  Nasal running now for three days.  No sore throat, no cough, no fever.  Declines COVID testing today.    PPI is controlling symptoms     Still feels he doesn't sleep well due to CPAP machine.   Did see a specialist about the implant device for sleep apnea - but was not felt to be a good candidate.  Did get a new CPAP machine.    Do you feel safe in your environment? Yes    Have you ever done Advance Care Planning? (For example, a Health Directive, POLST, or a discussion with a medical provider or your loved ones about your wishes): Yes, advance care planning is on file.       Fall risk  Fallen 2 or more " times in the past year?: No  Any fall with injury in the past year?: No    Cognitive Screening   1) Repeat 3 items (Leader, Season, Table)    2) Clock draw: NORMAL  3) 3 item recall: Recalls 1 object   Results: NORMAL clock, 1-2 items recalled: COGNITIVE IMPAIRMENT LESS LIKELY    Mini-CogTM Copyright RHODA Herrera. Licensed by the author for use in [facility]; reprinted with permission (stew@[facility]). All rights reserved.      Do you have sleep apnea, excessive snoring or daytime drowsiness?: yes    Reviewed and updated as needed this visit by clinical staff   Tobacco  Allergies  Meds   Med Hx  Surg Hx  Fam Hx  Soc Hx          Reviewed and updated as needed this visit by Provider     Meds  Problems              Social History     Tobacco Use     Smoking status: Former Smoker     Packs/day: 1.00     Years: 35.00     Pack years: 35.00     Types: Cigarettes, Cigars, Pipe     Start date: 1963     Quit date: 10/13/1998     Years since quittin.5     Smokeless tobacco: Never Used   Substance Use Topics     Alcohol use: Yes     Comment: about a beer a week          Alcohol Use 2022   Prescreen: >3 drinks/day or >7 drinks/week? No   Prescreen: >3 drinks/day or >7 drinks/week? -       Diabetes Follow-up      How often are you checking your blood sugar? Not at all    What concerns do you have today about your diabetes? None     Do you have any of these symptoms? (Select all that apply)  No numbness or tingling in feet.  No redness, sores or blisters on feet.  No complaints of excessive thirst.  No reports of blurry vision.  No significant changes to weight.      BP Readings from Last 2 Encounters:   22 (!) 142/70   21 132/66     Hemoglobin A1C POCT (%)   Date Value   2021 6.7 (H)   10/31/2019 5.8 (H)     Hemoglobin A1C (%)   Date Value   2022 6.7 (H)   2021 6.6 (H)     LDL Cholesterol Calculated (mg/dL)   Date Value   2021     Cannot estimate LDL when  triglyceride exceeds 400 mg/dL   10/07/2019 62     LDL Cholesterol Direct (mg/dL)   Date Value   2021 85         Depression Followup    How are you doing with your depression since your last visit? Worsened    Are you having other symptoms that might be associated with depression? No    Have you had a significant life event?  No     Are you feeling anxious or having panic attacks?   No    Do you have any concerns with your use of alcohol or other drugs? No    Social History     Tobacco Use     Smoking status: Former Smoker     Packs/day: 1.00     Years: 35.00     Pack years: 35.00     Types: Cigarettes, Cigars, Pipe     Start date: 1963     Quit date: 10/13/1998     Years since quittin.5     Smokeless tobacco: Never Used   Substance Use Topics     Alcohol use: Yes     Comment: about a beer a week      Drug use: No     PHQ 2019   PHQ-9 Total Score 13 14 12   Q9: Thoughts of better off dead/self-harm past 2 weeks Several days More than half the days Not at all   F/U: Thoughts of suicide or self-harm - No -   F/U: Safety concerns - No -     ODILIA-7 SCORE 2018   Total Score - - 3 (minimal anxiety)   Total Score 1 1 3     Last PHQ-9 2022   1.  Little interest or pleasure in doing things 3   2.  Feeling down, depressed, or hopeless 3   3.  Trouble falling or staying asleep, or sleeping too much 3   4.  Feeling tired or having little energy 3   5.  Poor appetite or overeating 0   6.  Feeling bad about yourself 0   7.  Trouble concentrating 0   8.  Moving slowly or restless 0   Q9: Thoughts of better off dead/self-harm past 2 weeks 0   PHQ-9 Total Score 12   Difficulty at work, home, or with people -   In the past two weeks have you had thoughts of suicide or self harm? -   Do you have concerns about your personal safety or the safety of others? -       Suicide Assessment Five-step Evaluation and Treatment (SAFE-T)      Current providers sharing in care for  "this patient include:   Patient Care Team:  Frannie Talavera MD as PCP - General  Charlie Wooten MD as MD (Orthopedics)  Addy Juan MD as MD (Urology)  Frannie Talavera MD as Assigned PCP    The following health maintenance items are reviewed in Epic and correct as of today:  Health Maintenance Due   Topic Date Due     DIABETIC FOOT EXAM  Never done     ANNUAL REVIEW OF HM ORDERS  Never done     BMP  10/16/2021     PHQ-9  11/25/2021     LIPID  04/16/2022               Review of Systems   Constitutional: Negative for chills and fever.   HENT: Negative for congestion, ear pain, hearing loss and sore throat.    Eyes: Negative for pain and visual disturbance.   Respiratory: Negative for cough and shortness of breath.    Cardiovascular: Negative for chest pain, palpitations and peripheral edema.   Gastrointestinal: Negative for abdominal pain, constipation, diarrhea, heartburn, hematochezia and nausea.   Genitourinary: Positive for urgency. Negative for dysuria, frequency, genital sores, hematuria, impotence and penile discharge.   Musculoskeletal: Positive for arthralgias. Negative for joint swelling and myalgias.   Skin: Negative for rash.   Neurological: Negative for dizziness, weakness, headaches and paresthesias.   Psychiatric/Behavioral: Positive for mood changes. The patient is not nervous/anxious.          OBJECTIVE:   BP (!) 142/70 (BP Location: Right arm, Patient Position: Chair, Cuff Size: Adult Regular)   Pulse 80   Temp 98.2  F (36.8  C) (Oral)   Resp 20   Ht 1.745 m (5' 8.7\")   Wt 83.9 kg (185 lb)   SpO2 99%   BMI 27.56 kg/m   Estimated body mass index is 27.56 kg/m  as calculated from the following:    Height as of this encounter: 1.745 m (5' 8.7\").    Weight as of this encounter: 83.9 kg (185 lb).  Physical Exam  GENERAL: healthy, alert and no distress  RESP: lungs clear to auscultation - no rales, rhonchi or wheezes  CV: regular rate and rhythm, normal " "S1 S2, no S3 or S4, no murmur, click or rub, no peripheral edema and peripheral pulses strong  MS: no gross musculoskeletal defects noted, no edema  PSYCH: mentation appears normal, affect normal/bright  Diabetic foot exam: normal DP and PT pulses, no trophic changes or ulcerative lesions and normal sensory exam    Diagnostic Test Results:  Labs reviewed in Epic    ASSESSMENT / PLAN:   (Z00.00) Encounter for Medicare annual wellness exam  (primary encounter diagnosis)  Comment: stable health  Plan: Health Care Maintenance reviewed, measures not yet completed have been discussed.    Discussed wrist pain and knee pain. Patient feels he knows how to manage this pain.    (E11.65) Type 2 diabetes mellitus with hyperglycemia, without long-term current use of insulin (H)  Comment: diet controlled  Plan: REVIEW OF HEALTH MAINTENANCE PROTOCOL ORDERS,         BASIC METABOLIC PANEL, HEMOGLOBIN A1C, Lipid         panel reflex to direct LDL Non-fasting        Stable.   Continue to monitor q 6 months.    (K21.9) Gastroesophageal reflux disease without esophagitis  Comment: symptoms controlled with medication  Plan: omeprazole (PRILOSEC) 20 MG DR capsule        Continue current medication      (Z13.6) CARDIOVASCULAR SCREENING; LDL GOAL LESS THAN 160  Comment: not fasting today  Plan: REVIEW OF HEALTH MAINTENANCE PROTOCOL ORDERS,         Lipid panel reflex to direct LDL Non-fasting        adjust therapy based on labs      (F32.0) Mild major depression (H)  Comment: mood has improved some, feels happy in his work again.  Plan: continue to monitor.         COUNSELING:  Reviewed preventive health counseling, as reflected in patient instructions    Estimated body mass index is 27.56 kg/m  as calculated from the following:    Height as of this encounter: 1.745 m (5' 8.7\").    Weight as of this encounter: 83.9 kg (185 lb).        He reports that he quit smoking about 23 years ago. His smoking use included cigarettes, cigars, and pipe. He " started smoking about 58 years ago. He has a 35.00 pack-year smoking history. He has never used smokeless tobacco.      Appropriate preventive services were discussed with this patient, including applicable screening as appropriate for cardiovascular disease, diabetes, osteopenia/osteoporosis, and glaucoma.  As appropriate for age/gender, discussed screening for colorectal cancer, prostate cancer, breast cancer, and cervical cancer. Checklist reviewing preventive services available has been given to the patient.    Reviewed patients plan of care and provided an AVS. The Basic Care Plan (routine screening as documented in Health Maintenance) for Garrett meets the Care Plan requirement. This Care Plan has been established and reviewed with the Patient.    Counseling Resources:  ATP IV Guidelines  Pooled Cohorts Equation Calculator  Breast Cancer Risk Calculator  Breast Cancer: Medication to Reduce Risk  FRAX Risk Assessment  ICSI Preventive Guidelines  Dietary Guidelines for Americans, 2010  Zinch's MyPlate  ASA Prophylaxis  Lung CA Screening    Frannie Mary MD  [facility]    Identified Health Risks:  Answers for HPI/ROS submitted by the patient on 4/21/2022  If you checked off any problems, how difficult have these problems made it for you to do your work, take care of things at home, or get along with other people?: Not difficult at all  PHQ9 TOTAL SCORE: 12  ODILIA 7 TOTAL SCORE: 3

## 2022-04-22 LAB
ANION GAP SERPL CALCULATED.3IONS-SCNC: 7 MMOL/L (ref 3–14)
BUN SERPL-MCNC: 13 MG/DL (ref 7–30)
CALCIUM SERPL-MCNC: 9.4 MG/DL (ref 8.5–10.1)
CHLORIDE BLD-SCNC: 104 MMOL/L (ref 94–109)
CHOLEST SERPL-MCNC: 152 MG/DL
CO2 SERPL-SCNC: 27 MMOL/L (ref 20–32)
CREAT SERPL-MCNC: 0.93 MG/DL (ref 0.66–1.25)
GFR SERPL CREATININE-BSD FRML MDRD: 85 ML/MIN/1.73M2
GLUCOSE BLD-MCNC: 152 MG/DL (ref 70–99)
HDLC SERPL-MCNC: 24 MG/DL
LDLC SERPL CALC-MCNC: 81 MG/DL
LDLC SERPL CALC-MCNC: ABNORMAL MG/DL
NONHDLC SERPL-MCNC: 128 MG/DL
POTASSIUM BLD-SCNC: 3.8 MMOL/L (ref 3.4–5.3)
SODIUM SERPL-SCNC: 138 MMOL/L (ref 133–144)
TRIGL SERPL-MCNC: 433 MG/DL

## 2022-04-22 ASSESSMENT — ANXIETY QUESTIONNAIRES: GAD7 TOTAL SCORE: 3

## 2022-04-22 ASSESSMENT — PATIENT HEALTH QUESTIONNAIRE - PHQ9: SUM OF ALL RESPONSES TO PHQ QUESTIONS 1-9: 12

## 2022-05-31 NOTE — TELEPHONE ENCOUNTER
Please encourage some home checks and follow up again with him in two weeks - to determine if we need to make medication adjustments or not.      thanks

## 2022-05-31 NOTE — TELEPHONE ENCOUNTER
Called and reviewed with pt. He will start to monitor is BP daily and record the readings. I will postpone this call for 2 weeks. We will call him back in 2 weeks to review his readings.     Gabrielle Elliott RN

## 2022-05-31 NOTE — TELEPHONE ENCOUNTER
Routing to PCP -    Called pt and he has not been taking his BP regularly or writting the readings down. He states on average his BP is 130-140's/70's. He did have 1 reading of systolic 160's.    Pt states he feels good, no symptoms or concerns.     Notified pt will forward to Dr. Talavera and call him back with her recommendations.     Gabrielle Elliott RN

## 2022-06-14 NOTE — TELEPHONE ENCOUNTER
Called patient, left message with spouse to have him call clinic back when ne is available.      Nery Owen RN

## 2022-06-14 NOTE — TELEPHONE ENCOUNTER
Called pt and he has been taking his BP for the past 2 weeks    146/72  76  156/80  74  152/79  72  135/68  79  148/78  76  141/74  72  137/72  75  142/75  73  141/72  73  148/82  71  134/71  73   153/76  72    Would you like a visit with pt to discuss further?    Gabrielle Elliott RN

## 2022-06-14 NOTE — TELEPHONE ENCOUNTER
As the majority of his blood pressures are above 140/90 I would suggest either an in person or a virtual visit to discuss blood pressure and consideration for medication.    Can add him in on a Thursday at end of day, if he prefers in person

## 2022-06-14 NOTE — TELEPHONE ENCOUNTER
Pt called back. He is concerned that is home BP machine is reading high and these readings are not accurate.   Notified pt we recommend he schedule a visit with Dr. Talavera and at that visit we can check the accuracy of his home monitor.   Pt schedule to see Dr. Talavera 6/30/22 and will bring his home BP monitor to visit.    Gabrielle Elliott RN

## 2022-06-30 ENCOUNTER — OFFICE VISIT (OUTPATIENT)
Dept: FAMILY MEDICINE | Facility: CLINIC | Age: 77
End: 2022-06-30
Payer: COMMERCIAL

## 2022-06-30 VITALS
RESPIRATION RATE: 16 BRPM | OXYGEN SATURATION: 96 % | HEIGHT: 69 IN | BODY MASS INDEX: 27.16 KG/M2 | WEIGHT: 183.4 LBS | TEMPERATURE: 98 F | DIASTOLIC BLOOD PRESSURE: 74 MMHG | SYSTOLIC BLOOD PRESSURE: 138 MMHG | HEART RATE: 82 BPM

## 2022-06-30 DIAGNOSIS — R03.0 ELEVATED BLOOD PRESSURE READING WITHOUT DIAGNOSIS OF HYPERTENSION: Primary | ICD-10-CM

## 2022-06-30 PROCEDURE — 99213 OFFICE O/P EST LOW 20 MIN: CPT | Performed by: FAMILY MEDICINE

## 2022-06-30 ASSESSMENT — PAIN SCALES - GENERAL: PAINLEVEL: NO PAIN (0)

## 2022-06-30 NOTE — PROGRESS NOTES
"  Assessment & Plan     (R03.0) Elevated blood pressure reading without diagnosis of hypertension  (primary encounter diagnosis)  Comment: technically - due to home blood pressures - qualifies as hypertensive  Plan: discussed risks of untreated HTN.  And patient declines therapy at this time.                 Return in about 3 months (around 9/30/2022) for Diabetes, with me.    Frannie Mary MD  [facility]    Sarwat Tucker is a 76 year old, presenting for the following health issues:  Hypertension      HPI     Hypertension Follow-up      Do you check your blood pressure regularly outside of the clinic? Yes     Are you following a low salt diet? No    Are your blood pressures ever more than 140 on the top number (systolic) OR more   than 90 on the bottom number (diastolic), for example 140/90? Yes      How many servings of fruits and vegetables do you eat daily?  0-1    On average, how many sweetened beverages do you drink each day (Examples: soda, juice, sweet tea, etc.  Do NOT count diet or artificially sweetened beverages)?   0    How many days per week do you exercise enough to make your heart beat faster? 3 or less    How many minutes a day do you exercise enough to make your heart beat faster? 9 or less    How many days per week do you miss taking your medication? 0    Home blood pressure correlated with our machine.  Home blood pressures :  130-150's/70's    Left knee.  Lower knee pain when the weather changes.  Has had injections in the past.    Does not have daily exercise.  Is more active [place] is [place] the cabin.    Review of Systems   Constitutional, HEENT, cardiovascular, pulmonary, gi and gu systems are negative, except as otherwise noted.      Objective    /74   Pulse 82   Temp 98  F (36.7  C) (Tympanic)   Resp 16   Ht 1.745 m (5' 8.7\")   Wt 83.2 kg (183 lb 6.4 oz)   SpO2 96%   BMI 27.32 kg/m    Body mass index is 27.32 kg/m .  Physical Exam   GENERAL: " healthy, alert and no distress  PSYCH: mentation appears normal, affect normal/bright                    .  ..

## 2022-08-27 ENCOUNTER — HEALTH MAINTENANCE LETTER (OUTPATIENT)
Age: 77
End: 2022-08-27

## 2023-01-27 ENCOUNTER — OFFICE VISIT (OUTPATIENT)
Dept: URGENT CARE | Facility: URGENT CARE | Age: 78
End: 2023-01-27
Payer: COMMERCIAL

## 2023-01-27 VITALS
OXYGEN SATURATION: 98 % | TEMPERATURE: 97.7 F | HEART RATE: 88 BPM | WEIGHT: 185 LBS | SYSTOLIC BLOOD PRESSURE: 150 MMHG | RESPIRATION RATE: 14 BRPM | BODY MASS INDEX: 27.56 KG/M2 | DIASTOLIC BLOOD PRESSURE: 72 MMHG

## 2023-01-27 DIAGNOSIS — R03.0 ELEVATED BLOOD PRESSURE READING: Primary | ICD-10-CM

## 2023-01-27 DIAGNOSIS — J01.90 ACUTE NON-RECURRENT SINUSITIS, UNSPECIFIED LOCATION: ICD-10-CM

## 2023-01-27 PROCEDURE — 99213 OFFICE O/P EST LOW 20 MIN: CPT | Performed by: PHYSICIAN ASSISTANT

## 2023-01-27 RX ORDER — DOXYCYCLINE HYCLATE 100 MG
100 TABLET ORAL 2 TIMES DAILY
Qty: 14 TABLET | Refills: 0 | Status: SHIPPED | OUTPATIENT
Start: 2023-01-27 | End: 2023-02-03

## 2023-01-27 ASSESSMENT — ENCOUNTER SYMPTOMS
RHINORRHEA: 1
FEVER: 0
COUGH: 0
SORE THROAT: 0
HEADACHES: 0
APPETITE CHANGE: 1

## 2023-01-27 NOTE — PROGRESS NOTES
"SUBJECTIVE:   Garrett Del Rio is a 77 year old male presenting with a chief complaint of   Chief Complaint   Patient presents with     Nasal Congestion     Per pt he has been a \"mucus machine\"  been having non stop congestion.       He is an established patient of [facility].  Patient presents with nasal congestion and PND for 1 month.  Decreased appetite.  +cpap.  .  No facial surgeries.      Treatment:  Mucinex, chloraseptic spray.        Review of Systems   Constitutional: Positive for appetite change. Negative for fever.   HENT: Positive for congestion, postnasal drip and rhinorrhea. Negative for sore throat.    Respiratory: Negative for cough.    Neurological: Negative for headaches.   All other systems reviewed and are negative.      Past Medical History:   Diagnosis Date     Kidney stone 11/10/2011    Diagnosed by ultrasound 10/24/11 3-4 mm      Lung nodule      MEDIAL MENISCUS TEAR - left 10/24/2011     Type 2 diabetes mellitus with hyperglycemia, without long-term current use of insulin (H) 5/25/2021     Family History   Problem Relation Age of Onset     Prostate Cancer Father      C.A.D. Father      Diabetes Paternal Grandfather      Hypertension No family hx of      Cancer No family hx of      Current Outpatient Medications   Medication Sig Dispense Refill     Acetaminophen (TYLENOL PO)        Ascorbic Acid (VITAMIN C CR PO) Take  by mouth.       aspirin 81 MG tablet Take 1 tablet by mouth 2 times daily        doxycycline hyclate (VIBRA-TABS) 100 MG tablet Take 1 tablet (100 mg) by mouth 2 times daily for 7 days 14 tablet 0     EPINEPHrine (EPIPEN 2-NETO) 0.3 MG/0.3ML injection Inject 0.3 mLs into the muscle once as needed for anaphylaxis for 1 dose. 2 each 3     IBUPROFEN PO        Multiple Minerals-Vitamins (CALCIUM-MAGNESIUM-ZINC-D3) TABS Take 1 tablet by mouth 2 times daily       Multiple Vitamin (MULTI-VITAMINS) TABS Take 1 tablet by mouth daily       omeprazole (PRILOSEC) 20 MG DR capsule Take " 1 capsule by mouth once daily 90 capsule 3     order for DME CPAP 9 cm H20 1 Units 0     diclofenac (VOLTAREN) 1 % topical gel Apply 2 g to right elbow four times a day as needed for pain (Patient not taking: Reported on 2022) 50 g 1     Social History     Tobacco Use     Smoking status: Former     Packs/day: 1.00     Years: 35.00     Pack years: 35.00     Types: Cigarettes, Cigars, Pipe     Start date: 1963     Quit date: 10/13/1998     Years since quittin.3     Smokeless tobacco: Never   Substance Use Topics     Alcohol use: Yes     Comment: about a beer a week        OBJECTIVE  BP (!) 150/72   Pulse 88   Temp 97.7  F (36.5  C) (Oral)   Resp 14   Wt 83.9 kg (185 lb)   SpO2 98%   BMI 27.56 kg/m      Physical Exam  Vitals and nursing note reviewed.   Constitutional:       Appearance: Normal appearance. He is normal weight.   HENT:      Head: Normocephalic and atraumatic.      Right Ear: Tympanic membrane, ear canal and external ear normal.      Left Ear: Tympanic membrane, ear canal and external ear normal.      Nose: Nose normal.      Mouth/Throat:      Mouth: Mucous membranes are moist.      Pharynx: Oropharynx is clear.   Eyes:      Extraocular Movements: Extraocular movements intact.      Conjunctiva/sclera: Conjunctivae normal.   Cardiovascular:      Rate and Rhythm: Normal rate and regular rhythm.      Pulses: Normal pulses.      Heart sounds: Normal heart sounds.   Pulmonary:      Effort: Pulmonary effort is normal.      Breath sounds: Normal breath sounds.   Musculoskeletal:      Cervical back: Normal range of motion and neck supple. No rigidity. No muscular tenderness.   Skin:     General: Skin is warm and dry.   Neurological:      General: No focal deficit present.      Mental Status: He is alert.   Psychiatric:         Mood and Affect: Mood normal.         Behavior: Behavior normal.         Labs:  No results found for this or any previous visit (from the past 24 hour(s)).    X-Ray was  not done.    ASSESSMENT:      ICD-10-CM    1. Elevated blood pressure reading  R03.0       2. Acute non-recurrent sinusitis, unspecified location  J01.90 doxycycline hyclate (VIBRA-TABS) 100 MG tablet           Medical Decision Making:    Differential Diagnosis:  URI Adult/Peds:  Sinusitis    Serious Comorbid Conditions:  Adult:  reviewed    PLAN:    Rx for doxycycline.  Continue with mucinex.  Drink plenty of fluids.    Blood pressure to be repeated before discharge.  Patient asked to monitor blood pressure and follow up with PCP for management.        Followup:    If not improving or if condition worsens, follow up with your Primary Care Provider, If not improving or if conditions worsens over the next 12-24 hours, go to the Emergency Department    There are no Patient Instructions on file for this visit.

## 2023-02-20 ENCOUNTER — OFFICE VISIT (OUTPATIENT)
Dept: URGENT CARE | Facility: URGENT CARE | Age: 78
End: 2023-02-20
Payer: COMMERCIAL

## 2023-02-20 VITALS
OXYGEN SATURATION: 98 % | HEART RATE: 69 BPM | SYSTOLIC BLOOD PRESSURE: 165 MMHG | TEMPERATURE: 98.1 F | BODY MASS INDEX: 28.21 KG/M2 | DIASTOLIC BLOOD PRESSURE: 73 MMHG | WEIGHT: 189.4 LBS

## 2023-02-20 DIAGNOSIS — R09.89 RUNNY NOSE: ICD-10-CM

## 2023-02-20 DIAGNOSIS — R09.82 POST-NASAL DRIP: Primary | ICD-10-CM

## 2023-02-20 DIAGNOSIS — R05.1 ACUTE COUGH: ICD-10-CM

## 2023-02-20 PROCEDURE — 99213 OFFICE O/P EST LOW 20 MIN: CPT

## 2023-02-20 NOTE — PROGRESS NOTES
ASSESSMENT:  (R09.82) Post-nasal drip  (primary encounter diagnosis)    (R09.89) Runny nose    (R05.1) Acute cough    PLAN:  Informed the patient to take Mucinex 12-hour tablets for the runny nose and postnasal drip that is causing the cough.  We discussed the need to drink plenty of fluids and use a humidifier to help prevent breathing and the drier air.  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  Informed the patient to return to clinic with any new or worsening symptoms.  Patient acknowledged his understanding of the above plan.    The use of Dragon/PowerMic dictation services may have been used to construct the content in this note; any grammatical or spelling errors are non-intentional. Please contact the author of this note directly if you are in need of any clarification.      Addy Burdick, RODRIGO CNP      SUBJECTIVE:  Garrett Del Rio is a 77 year old male who presents to the clinic today with a chief complaint of cough for 2 day(s).  He describes the cough as occurring based on the runny nose draining in the back of the throat.    His cough is described as slightly productive.  The patient's symptoms are mild and not changing over the course of time.  Associated symptoms include rhinorrhea and sore throat. The patient's symptoms are exacerbated by no particular triggers  Patient has been using Mucinex DM  to improve symptoms.    ROS  Negative except noted above.     OBJECTIVE:  BP (!) 165/73 (BP Location: Right arm, Cuff Size: Adult Regular)   Pulse 69   Temp 98.1  F (36.7  C) (Tympanic)   Wt 85.9 kg (189 lb 6.4 oz)   SpO2 98%   BMI 28.21 kg/m    GENERAL APPEARANCE: healthy, alert and no distress  EYES: EOMI,  PERRL, conjunctiva clear  HENT: ear canals and TM's normal.  Nose and mouth without ulcers, erythema or lesions  NECK: supple, nontender, no lymphadenopathy  RESP: lungs clear to auscultation - no rales, rhonchi or  wheezes  CV: regular rates and rhythm, normal S1 S2, no murmur noted  SKIN: no suspicious lesions or rashes

## 2023-02-20 NOTE — PATIENT INSTRUCTIONS
Take Mucinex 12 hour tablets for the runny nose and post-nasal drip that is causing your cough.  Drink plenty of fluids and use a humidifier to help prevent breathing in the drier air.  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

## 2023-04-10 ENCOUNTER — TELEPHONE (OUTPATIENT)
Dept: FAMILY MEDICINE | Facility: CLINIC | Age: 78
End: 2023-04-10
Payer: COMMERCIAL

## 2023-04-10 DIAGNOSIS — E66.3 OVERWEIGHT: ICD-10-CM

## 2023-04-10 DIAGNOSIS — R53.83 FATIGUE, UNSPECIFIED TYPE: Primary | ICD-10-CM

## 2023-04-10 DIAGNOSIS — E66.89 OTHER OBESITY: ICD-10-CM

## 2023-04-10 DIAGNOSIS — Z12.5 PROSTATE CANCER SCREENING: ICD-10-CM

## 2023-04-10 DIAGNOSIS — E11.65 TYPE 2 DIABETES MELLITUS WITH HYPERGLYCEMIA, WITHOUT LONG-TERM CURRENT USE OF INSULIN (H): ICD-10-CM

## 2023-04-10 DIAGNOSIS — Z13.6 CARDIOVASCULAR SCREENING; LDL GOAL LESS THAN 160: Chronic | ICD-10-CM

## 2023-04-10 DIAGNOSIS — K76.0 NONALCOHOLIC FATTY LIVER DISEASE: ICD-10-CM

## 2023-04-10 NOTE — TELEPHONE ENCOUNTER
Ordered his screening labs and added some orders to evaluate his fatigue. If he doesn't want these done, let us know.     Thanks,   Gabbi Gomez DO

## 2023-04-10 NOTE — TELEPHONE ENCOUNTER
Routing to PCP    Patient's wife calling. Consent to communicate on file.    She would like to schedule an appointment for the patient as he has been decreased in energy for a couple of months and he is now willing to come in to have a check up. He is due for appt and labs     RN scheduled appt for him.     He is due for some labs and would like to get them prior as he does not want to fast for too long.     RN pended labs for pcp to sign if agreeable.    Ira Nunez RN

## 2023-04-11 NOTE — TELEPHONE ENCOUNTER
RN called and spoke with patients wife.    RN relayed providers message.    Aj Montana RN, BSN, PHN  [facility]

## 2023-04-18 ENCOUNTER — LAB (OUTPATIENT)
Dept: LAB | Facility: CLINIC | Age: 78
End: 2023-04-18
Payer: COMMERCIAL

## 2023-04-18 DIAGNOSIS — R53.83 FATIGUE, UNSPECIFIED TYPE: ICD-10-CM

## 2023-04-18 DIAGNOSIS — E66.3 OVERWEIGHT: ICD-10-CM

## 2023-04-18 DIAGNOSIS — Z13.6 CARDIOVASCULAR SCREENING; LDL GOAL LESS THAN 160: Chronic | ICD-10-CM

## 2023-04-18 DIAGNOSIS — Z12.5 PROSTATE CANCER SCREENING: ICD-10-CM

## 2023-04-18 DIAGNOSIS — E66.89 OTHER OBESITY: ICD-10-CM

## 2023-04-18 DIAGNOSIS — K76.0 NONALCOHOLIC FATTY LIVER DISEASE: ICD-10-CM

## 2023-04-18 DIAGNOSIS — E11.65 TYPE 2 DIABETES MELLITUS WITH HYPERGLYCEMIA, WITHOUT LONG-TERM CURRENT USE OF INSULIN (H): Primary | ICD-10-CM

## 2023-04-18 LAB
ALBUMIN SERPL BCG-MCNC: 4.5 G/DL (ref 3.5–5.2)
ALP SERPL-CCNC: 87 U/L (ref 40–129)
ALT SERPL W P-5'-P-CCNC: 26 U/L (ref 10–50)
ANION GAP SERPL CALCULATED.3IONS-SCNC: 13 MMOL/L (ref 7–15)
AST SERPL W P-5'-P-CCNC: 34 U/L (ref 10–50)
BASOPHILS # BLD AUTO: 0 10E3/UL (ref 0–0.2)
BASOPHILS NFR BLD AUTO: 0 %
BILIRUB SERPL-MCNC: 0.9 MG/DL
BUN SERPL-MCNC: 8.4 MG/DL (ref 8–23)
CALCIUM SERPL-MCNC: 10.1 MG/DL (ref 8.8–10.2)
CHLORIDE SERPL-SCNC: 99 MMOL/L (ref 98–107)
CHOLEST SERPL-MCNC: 139 MG/DL
CREAT SERPL-MCNC: 0.99 MG/DL (ref 0.67–1.17)
CREAT UR-MCNC: 45.1 MG/DL
DEPRECATED HCO3 PLAS-SCNC: 24 MMOL/L (ref 22–29)
EOSINOPHIL # BLD AUTO: 0.2 10E3/UL (ref 0–0.7)
EOSINOPHIL NFR BLD AUTO: 3 %
ERYTHROCYTE [DISTWIDTH] IN BLOOD BY AUTOMATED COUNT: 13.6 % (ref 10–15)
FERRITIN SERPL-MCNC: 109 NG/ML (ref 31–409)
GFR SERPL CREATININE-BSD FRML MDRD: 78 ML/MIN/1.73M2
GLUCOSE SERPL-MCNC: 142 MG/DL (ref 70–99)
HBA1C MFR BLD: 8.9 % (ref 0–5.6)
HCT VFR BLD AUTO: 40.4 % (ref 40–53)
HDLC SERPL-MCNC: 28 MG/DL
HGB BLD-MCNC: 13.9 G/DL (ref 13.3–17.7)
LDLC SERPL CALC-MCNC: 59 MG/DL
LYMPHOCYTES # BLD AUTO: 1.9 10E3/UL (ref 0.8–5.3)
LYMPHOCYTES NFR BLD AUTO: 26 %
MCH RBC QN AUTO: 28.7 PG (ref 26.5–33)
MCHC RBC AUTO-ENTMCNC: 34.4 G/DL (ref 31.5–36.5)
MCV RBC AUTO: 84 FL (ref 78–100)
MICROALBUMIN UR-MCNC: <12 MG/L
MICROALBUMIN/CREAT UR: NORMAL MG/G{CREAT}
MONOCYTES # BLD AUTO: 0.5 10E3/UL (ref 0–1.3)
MONOCYTES NFR BLD AUTO: 7 %
NEUTROPHILS # BLD AUTO: 4.7 10E3/UL (ref 1.6–8.3)
NEUTROPHILS NFR BLD AUTO: 65 %
NONHDLC SERPL-MCNC: 111 MG/DL
PLATELET # BLD AUTO: 179 10E3/UL (ref 150–450)
POTASSIUM SERPL-SCNC: 4.3 MMOL/L (ref 3.4–5.3)
PROT SERPL-MCNC: 8.6 G/DL (ref 6.4–8.3)
PSA SERPL DL<=0.01 NG/ML-MCNC: 1.09 NG/ML (ref 0–6.5)
RBC # BLD AUTO: 4.84 10E6/UL (ref 4.4–5.9)
SODIUM SERPL-SCNC: 136 MMOL/L (ref 136–145)
T4 FREE SERPL-MCNC: 0.94 NG/DL (ref 0.9–1.7)
TRIGL SERPL-MCNC: 261 MG/DL
TSH SERPL DL<=0.005 MIU/L-ACNC: 4.49 UIU/ML (ref 0.3–4.2)
WBC # BLD AUTO: 7.3 10E3/UL (ref 4–11)

## 2023-04-18 PROCEDURE — 82043 UR ALBUMIN QUANTITATIVE: CPT

## 2023-04-18 PROCEDURE — 82570 ASSAY OF URINE CREATININE: CPT

## 2023-04-18 PROCEDURE — 80053 COMPREHEN METABOLIC PANEL: CPT

## 2023-04-18 PROCEDURE — 83036 HEMOGLOBIN GLYCOSYLATED A1C: CPT

## 2023-04-18 PROCEDURE — 82306 VITAMIN D 25 HYDROXY: CPT

## 2023-04-18 PROCEDURE — G0103 PSA SCREENING: HCPCS

## 2023-04-18 PROCEDURE — 85025 COMPLETE CBC W/AUTO DIFF WBC: CPT

## 2023-04-18 PROCEDURE — 36415 COLL VENOUS BLD VENIPUNCTURE: CPT

## 2023-04-18 PROCEDURE — 84439 ASSAY OF FREE THYROXINE: CPT

## 2023-04-18 PROCEDURE — 80061 LIPID PANEL: CPT

## 2023-04-18 PROCEDURE — 82728 ASSAY OF FERRITIN: CPT

## 2023-04-18 PROCEDURE — 84443 ASSAY THYROID STIM HORMONE: CPT

## 2023-04-19 ENCOUNTER — OFFICE VISIT (OUTPATIENT)
Dept: FAMILY MEDICINE | Facility: CLINIC | Age: 78
End: 2023-04-19
Payer: COMMERCIAL

## 2023-04-19 VITALS
DIASTOLIC BLOOD PRESSURE: 76 MMHG | WEIGHT: 180 LBS | OXYGEN SATURATION: 99 % | HEART RATE: 73 BPM | SYSTOLIC BLOOD PRESSURE: 126 MMHG | BODY MASS INDEX: 26.66 KG/M2 | TEMPERATURE: 97.9 F | HEIGHT: 69 IN

## 2023-04-19 DIAGNOSIS — K21.9 GASTROESOPHAGEAL REFLUX DISEASE WITHOUT ESOPHAGITIS: ICD-10-CM

## 2023-04-19 DIAGNOSIS — F33.0 MDD (MAJOR DEPRESSIVE DISORDER), RECURRENT EPISODE, MILD (H): ICD-10-CM

## 2023-04-19 DIAGNOSIS — G47.33 OSA (OBSTRUCTIVE SLEEP APNEA): Chronic | ICD-10-CM

## 2023-04-19 DIAGNOSIS — E11.65 TYPE 2 DIABETES MELLITUS WITH HYPERGLYCEMIA, WITHOUT LONG-TERM CURRENT USE OF INSULIN (H): Primary | ICD-10-CM

## 2023-04-19 DIAGNOSIS — R53.83 FATIGUE, UNSPECIFIED TYPE: ICD-10-CM

## 2023-04-19 DIAGNOSIS — Z23 NEED FOR DIPHTHERIA-TETANUS-PERTUSSIS (TDAP) VACCINE: ICD-10-CM

## 2023-04-19 DIAGNOSIS — R79.89 ABNORMAL TSH: ICD-10-CM

## 2023-04-19 DIAGNOSIS — R10.84 ABDOMINAL PAIN, GENERALIZED: ICD-10-CM

## 2023-04-19 LAB — DEPRECATED CALCIDIOL+CALCIFEROL SERPL-MC: 39 UG/L (ref 20–75)

## 2023-04-19 PROCEDURE — 99214 OFFICE O/P EST MOD 30 MIN: CPT | Performed by: STUDENT IN AN ORGANIZED HEALTH CARE EDUCATION/TRAINING PROGRAM

## 2023-04-19 PROCEDURE — 96127 BRIEF EMOTIONAL/BEHAV ASSMT: CPT | Performed by: STUDENT IN AN ORGANIZED HEALTH CARE EDUCATION/TRAINING PROGRAM

## 2023-04-19 RX ORDER — OMEPRAZOLE 40 MG/1
40 CAPSULE, DELAYED RELEASE ORAL DAILY
Qty: 90 CAPSULE | Refills: 0 | Status: SHIPPED | OUTPATIENT
Start: 2023-04-19 | End: 2023-08-18

## 2023-04-19 ASSESSMENT — ENCOUNTER SYMPTOMS: FATIGUE: 1

## 2023-04-19 NOTE — PROGRESS NOTES
Assessment & Plan     Type 2 diabetes mellitus with hyperglycemia, without long-term current use of insulin (H)  Lab Results   Component Value Date    A1C 8.9 04/18/2023    A1C 6.7 04/21/2022    A1C 6.6 08/05/2021    A1C 6.7 04/16/2021    A1C 5.8 10/31/2019    A1C 5.4 11/07/2018    A1C 5.7 10/16/2015    A1C 5.3 09/24/2014     Worsening A1c.  This is my first encounter with the patient.  He has not been on medications in the past for diabetes but has been adequately controlled previously.  Discussed that the elevation in his A1c may be contributing to his fatigue.  He patient to start medication today.  Advised him to work on improving his diet, diet is high in sugars and carbs.  He declines making changes to his diet.      Abnormal TSH  On lab evaluation for his fatigue his TSH was slightly off normal T4, recommend rechecking this in 2 months.  - TSH with free T4 reflex; Future    ADOLFO (obstructive sleep apnea)- moderate (AHI 21)  Has sleep apnea and uses his CPAP but wakes frequently due to his machine.  He has met with specialist for alternative treatments in the past but was not a candidate.    Fatigue, unspecified type  Likely multifactorial.  Suspect this is secondary to depression, poor sleep quality, uncontrolled diabetes and possible thyroid contribution.  Recommend rechecking thyroid function in 2 months.  He declines treatment for depression and diabetes.  He is on CPAP  - TSH with free T4 reflex; Future    major depression disorder, recurrent, mild (H)  Not adequately controlled.  He declines medication or therapy treatment as he thinks this is circumstantial due to the current state of the world.    Abdominal pain, generalized  Gastroesophageal reflux disease without esophagitis  Describes lower abdominal pain bilaterally but on exam has tenderness in the epigastrium.  He has known GERD and is on omeprazole 20 mg daily, recommend increasing this to 40 mg daily for the next 2 to 3 months follow back up.   Recommend using simethicone in the morning for gas relief as he endorses excess gas from his CPAP machine overnight and this may be causing some discomfort and bloating.  Constipation may also be contributing.  Drinks very little water and very little fruits and vegetables, recommend improving diet.  Has diverticulosis noted on previous colonoscopy, exam is not consistent with diverticulitis.  No urinary symptoms.  Symptoms have been improving recently so we will monitor and follow-up in 1 to 2 months  - omeprazole (PRILOSEC) 40 MG DR capsule; Take 1 capsule (40 mg) by mouth daily      Gabbi Gomez [facility]        Sarwat Tucker is a 77 year old, presenting for the following health issues:  Fatigue        4/19/2023    10:05 AM   Additional Questions   Accompanied by Wife         4/19/2023    10:05 AM   Patient Reported Additional Medications   Patient reports taking the following new medications None     Fatigue  Associated symptoms include fatigue.   History of Present Illness       Reason for visit:  My wife says I need to.    He eats 0-1 servings of fruits and vegetables daily.He consumes 1 sweetened beverage(s) daily.He exercises with enough effort to increase his heart rate 9 or less minutes per day.  He exercises with enough effort to increase his heart rate 5 days per week.   He is taking medications regularly.    Today's PHQ-9         PHQ-9 Total Score: 12    PHQ-9 Q9 Thoughts of better off dead/self-harm past 2 weeks :   Not at all    How difficult have these problems made it for you to do your work, take care of things at home, or get along with other people: Not difficult at all     Had labs done yesterday to evaluate chronic diseases and fatigue.   - no anemia; normal iron  - normal PSA   - cholesterol stable  - vitamin D pending  - TSH slightly high; normal T4  -  Normal CMP except slightly elevated total protein.   - significant increase in A1c to 8.9% from 6.7%.      Has known ADOLFO --  uses CPAP; doesn't sleep well with it because of readjusting the tubing. Saw specialist about implant device but not a good candidate. Got a new machine. No change. Causes gas. Dries him out.     Has been to UC twice and give antibiotics, helped clear it but keeps getting more mucous/congestion, worse on right. Producing a lot of mucous in sinuses. Some blood on right nostril. Hx of frequent sinus infections on right. No fevers/chills.       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 Saw a therapist before. No meds in the past and doesn't want them because states this won't help reality. Depression has been worse recently. Watches the news and gets frustrated regarding     Hx of nephrolithiasis  BPH --> follows with urology       having stomach pains b/l lower abdominal pain/discomfort for months.  Had two large BM's a week ago that were very unlike him. Symptoms improved after but back now. Typically has 1 BM in the morning and 1 in afternoon.   Decrease appetite.   This pain is going on for a couple months. Better recently.   Denies bloating. No dysuria. No melena or hematochezia.   Doesn't drink water because of driving school bus. Mostly 1 coffee then a soda. Sometimes water  Last colonoscopy 2018. Declines any other colonoscopy    Diet hasn't been great. Was having pizza for lunch 3 days. Now pasta for lunch. Soup. Doesn't eat any vegetables. Does have 1-2 fruits per day. Lot of chocolate.    Not bowling anymore      Lab Results   Component Value Date    A1C 8.9 04/18/2023    A1C 6.7 04/21/2022    A1C 6.6 08/05/2021    A1C 6.7 04/16/2021    A1C 5.8 10/31/2019    A1C 5.4 11/07/2018    A1C 5.7 10/16/2015    A1C 5.3 09/24/2014             Review of Systems   Constitutional: Positive for fatigue.            Objective    /76 (BP Location: Right arm, Patient Position: Sitting, Cuff Size: Adult Regular)   Pulse 73   Temp 97.9  F (36.6  C) (Oral)    "Ht 1.745 m (5' 8.7\")   Wt 81.6 kg (180 lb)   SpO2 99%   BMI 26.81 kg/m    Body mass index is 26.81 kg/m .  Physical Exam   GENERAL: healthy, alert and no distress  EYES: Eyes grossly normal to inspection, PERRL and conjunctivae and sclerae normal  HENT: ear canals and TM's normal, nose and mouth without ulcers or lesions  NECK: no adenopathy, no asymmetry, masses, or scars and thyroid normal to palpation  RESP: lungs clear to auscultation - no rales, rhonchi or wheezes  CV: regular rate and rhythm, normal S1 S2, no S3 or S4, no murmur, click or rub, no peripheral edema and peripheral pulses strong  ABDOMEN: soft, epigastric tender, no hepatosplenomegaly, no masses and bowel sounds normal  MS: no gross musculoskeletal defects noted, no edema      "

## 2023-04-20 ENCOUNTER — PATIENT OUTREACH (OUTPATIENT)
Dept: CARE COORDINATION | Facility: CLINIC | Age: 78
End: 2023-04-20
Payer: COMMERCIAL

## 2023-05-15 ENCOUNTER — MYC MEDICAL ADVICE (OUTPATIENT)
Dept: FAMILY MEDICINE | Facility: CLINIC | Age: 78
End: 2023-05-15
Payer: COMMERCIAL

## 2023-05-16 NOTE — TELEPHONE ENCOUNTER
RN replied to patient via [employer]. See message for details.     Aj Montana RN, BSN, PHN  [facility] [place]

## 2023-06-02 ENCOUNTER — HEALTH MAINTENANCE LETTER (OUTPATIENT)
Age: 78
End: 2023-06-02

## 2023-07-05 ENCOUNTER — TRANSFERRED RECORDS (OUTPATIENT)
Dept: HEALTH INFORMATION MANAGEMENT | Facility: CLINIC | Age: 78
End: 2023-07-05
Payer: COMMERCIAL

## 2023-07-20 ENCOUNTER — OFFICE VISIT (OUTPATIENT)
Dept: FAMILY MEDICINE | Facility: CLINIC | Age: 78
End: 2023-07-20
Payer: COMMERCIAL

## 2023-07-20 VITALS
HEIGHT: 69 IN | WEIGHT: 186.4 LBS | TEMPERATURE: 98.3 F | SYSTOLIC BLOOD PRESSURE: 158 MMHG | BODY MASS INDEX: 27.61 KG/M2 | RESPIRATION RATE: 20 BRPM | OXYGEN SATURATION: 98 % | HEART RATE: 76 BPM | DIASTOLIC BLOOD PRESSURE: 73 MMHG

## 2023-07-20 DIAGNOSIS — H26.9 CATARACT OF RIGHT EYE, UNSPECIFIED CATARACT TYPE: ICD-10-CM

## 2023-07-20 DIAGNOSIS — G47.33 OSA (OBSTRUCTIVE SLEEP APNEA): Chronic | ICD-10-CM

## 2023-07-20 DIAGNOSIS — E07.89 OTHER SPECIFIED DISORDERS OF THYROID: ICD-10-CM

## 2023-07-20 DIAGNOSIS — F33.0 MDD (MAJOR DEPRESSIVE DISORDER), RECURRENT EPISODE, MILD (H): ICD-10-CM

## 2023-07-20 DIAGNOSIS — Z01.818 PREOP GENERAL PHYSICAL EXAM: Primary | ICD-10-CM

## 2023-07-20 DIAGNOSIS — E11.65 TYPE 2 DIABETES MELLITUS WITH HYPERGLYCEMIA, WITHOUT LONG-TERM CURRENT USE OF INSULIN (H): ICD-10-CM

## 2023-07-20 DIAGNOSIS — R79.89 ABNORMAL TSH: ICD-10-CM

## 2023-07-20 DIAGNOSIS — I10 PRIMARY HYPERTENSION: ICD-10-CM

## 2023-07-20 LAB — HBA1C MFR BLD: 8.3 % (ref 0–5.6)

## 2023-07-20 PROCEDURE — 36415 COLL VENOUS BLD VENIPUNCTURE: CPT

## 2023-07-20 PROCEDURE — 84443 ASSAY THYROID STIM HORMONE: CPT

## 2023-07-20 PROCEDURE — 99214 OFFICE O/P EST MOD 30 MIN: CPT

## 2023-07-20 PROCEDURE — 83036 HEMOGLOBIN GLYCOSYLATED A1C: CPT

## 2023-07-20 PROCEDURE — 84439 ASSAY OF FREE THYROXINE: CPT

## 2023-07-20 ASSESSMENT — PATIENT HEALTH QUESTIONNAIRE - PHQ9
10. IF YOU CHECKED OFF ANY PROBLEMS, HOW DIFFICULT HAVE THESE PROBLEMS MADE IT FOR YOU TO DO YOUR WORK, TAKE CARE OF THINGS AT HOME, OR GET ALONG WITH OTHER PEOPLE: NOT DIFFICULT AT ALL
SUM OF ALL RESPONSES TO PHQ QUESTIONS 1-9: 9
SUM OF ALL RESPONSES TO PHQ QUESTIONS 1-9: 9

## 2023-07-20 NOTE — PATIENT INSTRUCTIONS
Consider adding fish oil supplement to your diet to help improve your HDL cholesterol (good cholesterol).     Increase squash, sweet potatoes, peas, carrots, green beans, oatmeal, cheerios, corn - increase in your diet.   Bananas.     Consider medications for blood pressure and diabetes  Blood Pressure: Lisinopril or losartan, chlorthalidone  Diabetes: metformin, glipizide, jardiance       For informational purposes only. Not to replace the advice of your health care provider. Copyright   ,  [facility]. All rights reserved. Clinically reviewed by Pati Lopez MD. [employer] [id number] - REV .  Preparing for Your Surgery  Getting started  A nurse will call you to review your health history and instructions. They will give you an arrival time based on your scheduled surgery time. Please be ready to share:  Your doctor's clinic name and phone number  Your medical, surgical, and anesthesia history  A list of allergies and sensitivities  A list of medicines, including herbal treatments and over-the-counter drugs  Whether the patient has a legal guardian (ask how to send us the papers in advance)  Please tell us if you're pregnant--or if there's any chance you might be pregnant. Some surgeries may injure a fetus (unborn baby), so they require a pregnancy test. Surgeries that are safe for a fetus don't always need a test, and you can choose whether to have one.   If you have a child who's having surgery, please ask for a copy of Preparing for Your Child's Surgery.    Preparing for surgery  Within 10 to 30 days of surgery: Have a pre-op exam (sometimes called an H&P, or History and Physical). This can be done at a clinic or pre-operative center.  If you're having a , you may not need this exam. Talk to your care team.  At your pre-op exam, talk to your care team about all medicines you take. If you need to stop any medicines before surgery, ask when to start taking them again.  We do this for  your safety. Many medicines can make you bleed too much during surgery. Some change how well surgery (anesthesia) drugs work.  Call your insurance company to let them know you're having surgery. (If you don't have insurance, call [phone].)  Call your clinic if there's any change in your health. This includes signs of a cold or flu (sore throat, runny nose, cough, rash, fever). It also includes a scrape or scratch near the surgery site.  If you have questions on the day of surgery, call your hospital or surgery center.  Eating and drinking guidelines  For your safety: Unless your surgeon tells you otherwise, follow the guidelines below.  Eat and drink as usual until 8 hours before you arrive for surgery. After that, no food or milk.  Drink clear liquids until 2 hours before you arrive. These are liquids you can see through, like water, Gatorade, and Propel Water. They also include plain black coffee and tea (no cream or milk), candy, and breath mints. You can spit out gum when you arrive.  If you drink alcohol: Stop drinking it the night before surgery.  If your care team tells you to take medicine on the morning of surgery, it's okay to take it with a sip of water.  Preventing infection  Shower or bathe the night before and morning of your surgery. Follow the instructions your clinic gave you. (If no instructions, use regular soap.)  Don't shave or clip hair near your surgery site. We'll remove the hair if needed.  Don't smoke or vape the morning of surgery. You may chew nicotine gum up to 2 hours before surgery. A nicotine patch is okay.  Note: Some surgeries require you to completely quit smoking and nicotine. Check with your surgeon.  Your care team will make every effort to keep you safe from infection. We will:  Clean our hands often with soap and water (or an alcohol-based hand rub).  Clean the skin at your surgery site with a special soap that kills germs.  Give you a special gown to keep you warm. (Cold  raises the risk of infection.)  Wear special hair covers, masks, gowns and gloves during surgery.  Give antibiotic medicine, if prescribed. Not all surgeries need antibiotics.  What to bring on the day of surgery  Photo ID and insurance card  Copy of your health care directive, if you have one  Glasses and hearing aids (bring cases)  You can't wear contacts during surgery  Inhaler and eye drops, if you use them (tell us about these when you arrive)  CPAP machine or breathing device, if you use them  A few personal items, if spending the night  If you have . . .  A pacemaker, ICD (cardiac defibrillator) or other implant: Bring the ID card.  An implanted stimulator: Bring the remote control.  A legal guardian: Bring a copy of the certified (court-stamped) guardianship papers.  Please remove any jewelry, including body piercings. Leave jewelry and other valuables at home.  If you're going home the day of surgery  You must have a responsible adult drive you home. They should stay with you overnight as well.  If you don't have someone to stay with you, and you aren't safe to go home alone, we may keep you overnight. Insurance often won't pay for this.  After surgery  If it's hard to control your pain or you need more pain medicine, please call your surgeon's office.  Questions?   If you have any questions for your care team, list them here: _________________________________________________________________________________________________________________________________________________________________________ ____________________________________ ____________________________________ ____________________________________    How to Take Your Medication Before Surgery  - HOLD (do not take) STOP aspirin now, restart after surgery. Hold ibuprofen now until surgery.   Continue all other medications as ordered.

## 2023-07-20 NOTE — PROGRESS NOTES
[address] [address]  Phone: [phone]  Primary Provider: Gabbi Gomez  Pre-op Performing Provider: RUTH HONG      PREOPERATIVE EVALUATION:  Today's date: 7/20/2023    Garrett Del Rio is a 77 year old male who presents for a preoperative evaluation.      7/20/2023     8:18 AM   Additional Questions   Roomed by Bran KENDRICK MA     Surgical Information:  Surgery/Procedure: Right Eye Cataract Surgery  Surgery Location: [facility]  Surgeon: Dr. Riedel  Surgery Date: 07/25/23  Time of Surgery: TBD  Where patient plans to recover: At home with family  Fax number for surgical facility: [phone]    Assessment & Plan     The proposed surgical procedure is considered LOW risk.    Preop general physical exam  Advised starting BP medications, starting medication for DM but patient declines. Advised most surgeries are not safe if SBP > 160 on day of surgery and sometimes require A1C < 8. Cataract surgery is low risk but advised discussing with eye surgeon.    Cataract of right eye, unspecified cataract type  Planned upcoming surgery.     Type 2 diabetes mellitus with hyperglycemia, without long-term current use of insulin (H)  Chronic, uncontrolled but A1C slightly improved from last. Advised medication - patient declines, advised discussing with PCP at upcoming annual.   - HEMOGLOBIN A1C  - HEMOGLOBIN A1C    MDD (major depressive disorder), recurrent episode, mild (H)  Chronic, stable/unchanged.     Primary hypertension  Chronic, uncontrolled. Patient declines medication. Advised low salt diet, patient reports not likely to follow any diet changes but will try to eat more of the few veggies he tolerates.     ADOLFO (obstructive sleep apnea)- moderate (AHI 21)  Chronic. Unchanged.     Abnormal TSH  Other specified disorders of thyroid  Released orders/dx pending for another visit. Not discussed today.  - TSH with free T4  reflex  - TSH with free T4 reflex  - T4 free               Risks and Recommendations:  The patient has the following additional risks and recommendations for perioperative complications:   - No identified additional risk factors other than previously addressed    Antiplatelet or Anticoagulation Medication Instructions:   - Bleeding risk is low for this procedure (e.g. dental, skin, cataract).   - aspirin: Bleeding risk is low for this procedure and daily aspirin may be continued without modification.     Additional Medication Instructions:  Advise holding any medications that cause stomach upset if taken without food    RECOMMENDATION:  APPROVAL GIVEN to proceed with proposed procedure, without further diagnostic evaluation.    Ordering of each unique test      Subjective       HPI related to upcoming procedure: has worsening visual acuity. Wants to preserve his vision so he can continue job as .         7/20/2023     8:01 AM   Preop Questions   1. Have you ever had a heart attack or stroke? No   2. Have you ever had surgery on your heart or blood vessels, such as a stent placement, a coronary artery bypass, or surgery on an artery in your head, neck, heart, or legs? No   3. Do you have chest pain with activity? No   4. Do you have a history of  heart failure? No   5. Do you currently have a cold, bronchitis or symptoms of other infection? No   6. Do you have a cough, shortness of breath, or wheezing? No   7. Do you or anyone in your family have previous history of blood clots? No   8. Do you or does anyone in your family have a serious bleeding problem such as prolonged bleeding following surgeries or cuts? UNKNOWN - Mother had low platelets.   9. Have you ever had problems with anemia or been told to take iron pills? No   10. Have you had any abnormal blood loss such as black, tarry or bloody stools? No   11. Have you ever had a blood transfusion? No   12. Are you willing to have a blood  transfusion if it is medically needed before, during, or after your surgery? Yes   13. Have you or any of your relatives ever had problems with anesthesia? UNKNOWN - unknown family history.   14. Do you have sleep apnea, excessive snoring or daytime drowsiness? YES - sleep apnea, pt using cpap for past 20 years.    14a. Do you have a CPAP machine? Yes   15. Do you have any artifical heart valves or other implanted medical devices like a pacemaker, defibrillator, or continuous glucose monitor? No   16. Do you have artificial joints? No   17. Are you allergic to latex? No         The 10-year ASCVD risk score (Celia ZAMORA, et al., 2019) is: 62.6%    Values used to calculate the score:      Age: 77 years      Sex: Male      Is Non- : No      Diabetic: Yes      Tobacco smoker: No      Systolic Blood Pressure: 158 mmHg      Is BP treated: No      HDL Cholesterol: 28 mg/dL      Total Cholesterol: 139 mg/dL       Health Care Directive:  Patient has a Health Care Directive on file      Preoperative Review of :   reviewed - no record of controlled substances prescribed.      Status of Chronic Conditions:  DIABETES - Patient has a longstanding history of DiabetesType Type II . Patient is being treated with none and denies significant side effects. Control has been poor. Complicating factors include but are not limited to: hypertension and hyperlipidemia.     HYPERTENSION - Patient has longstanding history of HTN , currently denies any symptoms referable to elevated blood pressure. Specifically denies chest pain, palpitations, dyspnea, orthopnea, PND or peripheral edema. Blood pressure readings have not been in normal range. Current medication regimen is as listed below. Patient denies any side effects of medication.     SLEEP PROBLEM - Patient has a longstanding history of sleep apnea, he has a CPAP which controls his symptoms.       Review of Systems  CONSTITUTIONAL: NEGATIVE for fever, chills,  change in weight  INTEGUMENTARY/SKIN: NEGATIVE for worrisome rashes, moles or lesions  EYES: NEGATIVE for vision changes or irritation  ENT/MOUTH: NEGATIVE for ear, mouth and throat problems  RESP: NEGATIVE for significant cough or SOB  CV: NEGATIVE for chest pain, palpitations or peripheral edema  GI: NEGATIVE for nausea, abdominal pain, heartburn, or change in bowel habits  : NEGATIVE for frequency, dysuria, or hematuria  MUSCULOSKELETAL: NEGATIVE for significant arthralgias or myalgia  NEURO: NEGATIVE for weakness, dizziness or paresthesias  ENDOCRINE: NEGATIVE for temperature intolerance, skin/hair changes  HEME: NEGATIVE for bleeding problems  PSYCHIATRIC: NEGATIVE for changes in mood or affect    Patient Active Problem List    Diagnosis Date Noted     Prostate cancer screening 10/29/2011     Priority: High     Provided by urologist Dr. Addy Juan [facility]     [phone]       Mild major depression (H) 04/21/2022     Priority: Medium     Type 2 diabetes mellitus with hyperglycemia, without long-term current use of insulin (H) 05/25/2021     Priority: Medium     Nonalcoholic fatty liver disease 11/14/2017     Priority: Medium     GI feels fatty liver is the cause of elevated liver enzymes.  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 Chest x-rays negative.        ADOLFO (obstructive sleep apnea)- moderate (AHI 21) 11/09/2011     Priority: Medium     Sleep study 03/18/2008 [facility] (177#)  apnea/hypopnea index of 21.6. This was worse in REM where the index increased to 47.3. Overall respiratory disturbance index is 32.8. Oxygen saturations were 91% or greater. There were 10.1 periodic limb movements of sleep  with minimal associated arousals. Supine REM was observed at 8 and 9 cm of water pressure. This latter may have conferred a slight advantage, although both pressures markedly improved sleep-disordered breathing. There were no periodic limb movements of sleep noted on nasal CPAP.         OA (OSTEOARTHRITIS) OF KNEE - left 10/24/2011     Priority: Medium     CARDIOVASCULAR SCREENING; LDL GOAL LESS THAN 160 05/09/2010     Priority: Medium     GERD (gastroesophageal reflux disease)      Priority: Medium     egd 12/90 8/2017       Esophageal spasms      Priority: Medium     has had uses NTG for relief          Past Medical History:   Diagnosis Date     Kidney stone 11/10/2011    Diagnosed by ultrasound 10/24/11 3-4 mm      Lung nodule      MEDIAL MENISCUS TEAR - left 10/24/2011     Type 2 diabetes mellitus with hyperglycemia, without long-term current use of insulin (H) 5/25/2021     Past Surgical History:   Procedure Laterality Date     CARDIAC STRESS TST,COMPLETE  2006     HC KNEE SCOPE,MED/LAT MENISECTOMY  11/11/11    left, with partial medial menisectomy ONLY     HERNIA REPAIR Right     [facility]: Nigel Poon D.O.     Current Outpatient Medications   Medication Sig Dispense Refill     Acetaminophen (TYLENOL PO)        Ascorbic Acid (VITAMIN C CR PO) Take  by mouth.       aspirin 81 MG tablet Take 1 tablet by mouth 2 times daily        diclofenac (VOLTAREN) 1 % topical gel Apply 2 g to right elbow four times a day as needed for pain 50 g 1     EPINEPHrine (EPIPEN 2-NETO) 0.3 MG/0.3ML injection Inject 0.3 mLs into the muscle once as needed for anaphylaxis for 1 dose. 2 each 3     IBUPROFEN PO        Multiple Minerals-Vitamins (CALCIUM-MAGNESIUM-ZINC-D3) TABS Take 1 tablet by mouth 2 times daily       Multiple Vitamin (MULTI-VITAMINS) TABS Take 1 tablet by mouth daily       omeprazole (PRILOSEC) 40 MG DR capsule Take 1 capsule (40 mg) by mouth daily 90 capsule 0     order for DME CPAP 9 cm H20 1 Units 0  "      Allergies   Allergen Reactions     Septra [Sulfamethoxazole W-Trimethoprim]      Urinary symptoms     Sulfamethoxazole-Trimethoprim         Social History     Tobacco Use     Smoking status: Former     Packs/day: 1.00     Years: 35.00     Pack years: 35.00     Types: Cigarettes, Cigars, Pipe     Start date: 1963     Quit date: 10/13/1998     Years since quittin.7     Smokeless tobacco: Never   Substance Use Topics     Alcohol use: Yes     Comment: about a beer a week      Family History   Problem Relation Age of Onset     Prostate Cancer Father      C.A.D. Father      Diabetes Paternal Grandfather      Hypertension No family hx of      Cancer No family hx of      History   Drug Use No         Objective     BP (!) 158/73 (BP Location: Right arm, Patient Position: Chair, Cuff Size: Adult Regular)   Pulse 76   Temp 98.3  F (36.8  C) (Oral)   Resp 20   Ht 1.745 m (5' 8.7\")   Wt 84.6 kg (186 lb 6.4 oz)   SpO2 98%   BMI 27.77 kg/m      Physical Exam    GENERAL APPEARANCE: healthy, alert and no distress     EYES: EOMI,  PERRL     HENT: ear canals and TM's normal and nose and mouth without ulcers or lesions     NECK: no adenopathy, no asymmetry, masses, or scars and thyroid normal to palpation     RESP: lungs clear to auscultation - no rales, rhonchi or wheezes     CV: regular rates and rhythm, normal S1 S2, no S3 or S4 and no murmur, click or rub     ABDOMEN:  soft, nontender, no HSM or masses and bowel sounds normal     MS: extremities normal- no gross deformities noted, no evidence of inflammation in joints, FROM in all extremities.     SKIN: no suspicious lesions or rashes     NEURO: Normal strength and tone, sensory exam grossly normal, mentation intact and speech normal     PSYCH: mentation appears normal. and affect normal/bright     LYMPHATICS: No cervical adenopathy    Recent Labs   Lab Test 23  0946 22  1232   HGB 13.9  --      --     138   POTASSIUM 4.3 3.8   CR 0.99 " 0.93   A1C 8.9* 6.7*        Diagnostics:  No labs were ordered during this visit.   No EKG required for low risk surgery (cataract, skin procedure, breast biopsy, etc).    Revised Cardiac Risk Index (RCRI):  The patient has the following serious cardiovascular risks for perioperative complications:   - No serious cardiac risks = 0 points     RCRI Interpretation: 0 points: Class I (very low risk - 0.4% complication rate)           Signed Electronically by: RODRIGO Rg CNP  Copy of this evaluation report is provided to requesting physician.

## 2023-07-21 LAB
T4 FREE SERPL-MCNC: 0.99 NG/DL (ref 0.9–1.7)
TSH SERPL DL<=0.005 MIU/L-ACNC: 4.34 UIU/ML (ref 0.3–4.2)

## 2023-08-02 ENCOUNTER — TRANSFERRED RECORDS (OUTPATIENT)
Dept: HEALTH INFORMATION MANAGEMENT | Facility: CLINIC | Age: 78
End: 2023-08-02
Payer: COMMERCIAL

## 2023-08-07 ENCOUNTER — TELEPHONE (OUTPATIENT)
Dept: FAMILY MEDICINE | Facility: CLINIC | Age: 78
End: 2023-08-07
Payer: COMMERCIAL

## 2023-08-07 DIAGNOSIS — Z78.9 ALLERGY HISTORY UNKNOWN: Primary | ICD-10-CM

## 2023-08-07 RX ORDER — EPINEPHRINE 0.3 MG/.3ML
0.3 INJECTION SUBCUTANEOUS PRN
Qty: 2 EACH | Refills: 3 | Status: SHIPPED | OUTPATIENT
Start: 2023-08-07 | End: 2024-07-21

## 2023-08-07 NOTE — TELEPHONE ENCOUNTER
Routing to PCP      Pt's wife calling requesting a prescription for both adult and pediatric epi pen.  She states that they have a cabin that has many bees and are needing these in case someone in their family has an allergic reaction as they are far away from medical help.  She states they have had this done in the past and are needing this for the coming weekend    Advised we cannot send a pediatric prescription under her name or Garrett's name to use on a grandchild, they should get in touch with pediatrician for epi pens if there is a concern.      RN asked if they have ever been stung, she states they have both gotten stung by bees and neither have had allergy - nothing noted in epic regarding allergic reaction to bee venom.    From 2012   Exposure  Comment: has a cabin far from medical assistance.  And is concerned about potential for allergic reaction that cannot be treated quickly enough  Plan: EPINEPHrine (EPIPEN 2-NETO) 0.3 MG/0.3ML         injection        Aware of appropriate use.

## 2023-08-08 NOTE — TELEPHONE ENCOUNTER
RN called and relayed provider message    Patient verbalized understanding and in agreement with plan of care.     Marla Wellington RN

## 2023-08-08 NOTE — TELEPHONE ENCOUNTER
Reviewed prior PCP's note - agree with ok to fill adult epi pen IF needed given remote location  but recommend obtaining child size dose from pediatric patient's PCP if willing.     Gabbi Gomez DO

## 2023-08-18 DIAGNOSIS — K21.9 GASTROESOPHAGEAL REFLUX DISEASE WITHOUT ESOPHAGITIS: ICD-10-CM

## 2023-08-18 RX ORDER — OMEPRAZOLE 40 MG/1
40 CAPSULE, DELAYED RELEASE ORAL DAILY
Qty: 90 CAPSULE | Refills: 0 | Status: SHIPPED | OUTPATIENT
Start: 2023-08-18 | End: 2023-08-21 | Stop reason: DRUGHIGH

## 2023-08-20 ASSESSMENT — ENCOUNTER SYMPTOMS
PALPITATIONS: 0
MYALGIAS: 0
DIARRHEA: 0
HEMATURIA: 0
NERVOUS/ANXIOUS: 0
ARTHRALGIAS: 0
JOINT SWELLING: 0
SORE THROAT: 1
WEAKNESS: 0
DYSURIA: 0
CHILLS: 0
HEADACHES: 0
PARESTHESIAS: 0
CONSTIPATION: 0
COUGH: 1
HEMATOCHEZIA: 0
FREQUENCY: 1
DIZZINESS: 0
FEVER: 0
ABDOMINAL PAIN: 0
NAUSEA: 0
HEARTBURN: 0
SHORTNESS OF BREATH: 0
EYE PAIN: 0

## 2023-08-20 ASSESSMENT — PATIENT HEALTH QUESTIONNAIRE - PHQ9
SUM OF ALL RESPONSES TO PHQ QUESTIONS 1-9: 15
SUM OF ALL RESPONSES TO PHQ QUESTIONS 1-9: 15
10. IF YOU CHECKED OFF ANY PROBLEMS, HOW DIFFICULT HAVE THESE PROBLEMS MADE IT FOR YOU TO DO YOUR WORK, TAKE CARE OF THINGS AT HOME, OR GET ALONG WITH OTHER PEOPLE: NOT DIFFICULT AT ALL

## 2023-08-20 ASSESSMENT — ACTIVITIES OF DAILY LIVING (ADL): CURRENT_FUNCTION: NO ASSISTANCE NEEDED

## 2023-08-21 ENCOUNTER — OFFICE VISIT (OUTPATIENT)
Dept: FAMILY MEDICINE | Facility: CLINIC | Age: 78
End: 2023-08-21
Payer: COMMERCIAL

## 2023-08-21 ENCOUNTER — MYC MEDICAL ADVICE (OUTPATIENT)
Dept: FAMILY MEDICINE | Facility: CLINIC | Age: 78
End: 2023-08-21

## 2023-08-21 VITALS
DIASTOLIC BLOOD PRESSURE: 70 MMHG | HEIGHT: 70 IN | WEIGHT: 186 LBS | TEMPERATURE: 98.2 F | OXYGEN SATURATION: 98 % | HEART RATE: 83 BPM | SYSTOLIC BLOOD PRESSURE: 134 MMHG | BODY MASS INDEX: 26.63 KG/M2

## 2023-08-21 DIAGNOSIS — R41.3 MEMORY CHANGE: ICD-10-CM

## 2023-08-21 DIAGNOSIS — R79.89 ABNORMAL TSH: ICD-10-CM

## 2023-08-21 DIAGNOSIS — I10 PRIMARY HYPERTENSION: ICD-10-CM

## 2023-08-21 DIAGNOSIS — K21.9 GASTROESOPHAGEAL REFLUX DISEASE WITHOUT ESOPHAGITIS: ICD-10-CM

## 2023-08-21 DIAGNOSIS — G47.33 OSA (OBSTRUCTIVE SLEEP APNEA): ICD-10-CM

## 2023-08-21 DIAGNOSIS — E11.65 TYPE 2 DIABETES MELLITUS WITH HYPERGLYCEMIA, WITHOUT LONG-TERM CURRENT USE OF INSULIN (H): ICD-10-CM

## 2023-08-21 DIAGNOSIS — R13.10 DYSPHAGIA, UNSPECIFIED TYPE: ICD-10-CM

## 2023-08-21 DIAGNOSIS — Z00.00 ENCOUNTER FOR MEDICARE ANNUAL WELLNESS EXAM: Primary | ICD-10-CM

## 2023-08-21 DIAGNOSIS — J01.00 ACUTE NON-RECURRENT MAXILLARY SINUSITIS: ICD-10-CM

## 2023-08-21 DIAGNOSIS — Z12.11 SCREEN FOR COLON CANCER: ICD-10-CM

## 2023-08-21 DIAGNOSIS — F33.0 MDD (MAJOR DEPRESSIVE DISORDER), RECURRENT EPISODE, MILD (H): ICD-10-CM

## 2023-08-21 DIAGNOSIS — K22.4 DYSKINESIA OF ESOPHAGUS: Chronic | ICD-10-CM

## 2023-08-21 LAB
ANION GAP SERPL CALCULATED.3IONS-SCNC: 14 MMOL/L (ref 7–15)
BASOPHILS # BLD AUTO: 0.1 10E3/UL (ref 0–0.2)
BASOPHILS NFR BLD AUTO: 1 %
BUN SERPL-MCNC: 10.2 MG/DL (ref 8–23)
CALCIUM SERPL-MCNC: 9.4 MG/DL (ref 8.8–10.2)
CHLORIDE SERPL-SCNC: 101 MMOL/L (ref 98–107)
CREAT SERPL-MCNC: 1.01 MG/DL (ref 0.67–1.17)
DEPRECATED HCO3 PLAS-SCNC: 25 MMOL/L (ref 22–29)
EOSINOPHIL # BLD AUTO: 0.2 10E3/UL (ref 0–0.7)
EOSINOPHIL NFR BLD AUTO: 5 %
ERYTHROCYTE [DISTWIDTH] IN BLOOD BY AUTOMATED COUNT: 13.9 % (ref 10–15)
GFR SERPL CREATININE-BSD FRML MDRD: 77 ML/MIN/1.73M2
GLUCOSE SERPL-MCNC: 164 MG/DL (ref 70–99)
HCT VFR BLD AUTO: 38 % (ref 40–53)
HGB BLD-MCNC: 12.8 G/DL (ref 13.3–17.7)
HOLD SPECIMEN: NORMAL
IMM GRANULOCYTES # BLD: 0 10E3/UL
IMM GRANULOCYTES NFR BLD: 0 %
LYMPHOCYTES # BLD AUTO: 1.6 10E3/UL (ref 0.8–5.3)
LYMPHOCYTES NFR BLD AUTO: 31 %
MCH RBC QN AUTO: 29.1 PG (ref 26.5–33)
MCHC RBC AUTO-ENTMCNC: 33.7 G/DL (ref 31.5–36.5)
MCV RBC AUTO: 86 FL (ref 78–100)
MONOCYTES # BLD AUTO: 0.3 10E3/UL (ref 0–1.3)
MONOCYTES NFR BLD AUTO: 6 %
NEUTROPHILS # BLD AUTO: 2.9 10E3/UL (ref 1.6–8.3)
NEUTROPHILS NFR BLD AUTO: 57 %
PLATELET # BLD AUTO: 138 10E3/UL (ref 150–450)
POTASSIUM SERPL-SCNC: 4.2 MMOL/L (ref 3.4–5.3)
RBC # BLD AUTO: 4.4 10E6/UL (ref 4.4–5.9)
SODIUM SERPL-SCNC: 140 MMOL/L (ref 136–145)
VIT B12 SERPL-MCNC: 859 PG/ML (ref 232–1245)
WBC # BLD AUTO: 5.1 10E3/UL (ref 4–11)

## 2023-08-21 PROCEDURE — G0439 PPPS, SUBSEQ VISIT: HCPCS | Performed by: STUDENT IN AN ORGANIZED HEALTH CARE EDUCATION/TRAINING PROGRAM

## 2023-08-21 PROCEDURE — 36415 COLL VENOUS BLD VENIPUNCTURE: CPT | Performed by: STUDENT IN AN ORGANIZED HEALTH CARE EDUCATION/TRAINING PROGRAM

## 2023-08-21 PROCEDURE — 82607 VITAMIN B-12: CPT | Performed by: STUDENT IN AN ORGANIZED HEALTH CARE EDUCATION/TRAINING PROGRAM

## 2023-08-21 PROCEDURE — 85025 COMPLETE CBC W/AUTO DIFF WBC: CPT | Performed by: STUDENT IN AN ORGANIZED HEALTH CARE EDUCATION/TRAINING PROGRAM

## 2023-08-21 PROCEDURE — 80048 BASIC METABOLIC PNL TOTAL CA: CPT | Performed by: STUDENT IN AN ORGANIZED HEALTH CARE EDUCATION/TRAINING PROGRAM

## 2023-08-21 PROCEDURE — 99214 OFFICE O/P EST MOD 30 MIN: CPT | Mod: 25 | Performed by: STUDENT IN AN ORGANIZED HEALTH CARE EDUCATION/TRAINING PROGRAM

## 2023-08-21 ASSESSMENT — ENCOUNTER SYMPTOMS
SHORTNESS OF BREATH: 0
HEADACHES: 0
NERVOUS/ANXIOUS: 0
HEMATURIA: 0
ARTHRALGIAS: 0
DIARRHEA: 0
CHILLS: 0
HEMATOCHEZIA: 0
COUGH: 1
JOINT SWELLING: 0
NAUSEA: 0
DIZZINESS: 0
ABDOMINAL PAIN: 0
PARESTHESIAS: 0
PALPITATIONS: 0
EYE PAIN: 0
FREQUENCY: 1
SORE THROAT: 1
DYSURIA: 0
CONSTIPATION: 0
MYALGIAS: 0
WEAKNESS: 0
HEARTBURN: 0
FEVER: 0

## 2023-08-21 ASSESSMENT — ACTIVITIES OF DAILY LIVING (ADL): CURRENT_FUNCTION: NO ASSISTANCE NEEDED

## 2023-08-21 NOTE — PATIENT INSTRUCTIONS
Call to schedule with [facility] for upper endoscopy to evaluate the food getting stuck in your throat       Please call to schedule your imaging at: [phone] (MRI brain to evaluate your memory concerns)    You will get a call to schedule with the neuropsychologist for memory testing.       Start antibiotic for sinus infection for 7 days, take with food

## 2023-08-21 NOTE — PROGRESS NOTES
"SUBJECTIVE:   Garrett is a 77 year old who presents for Preventive Visit.      8/21/2023     6:50 AM   Additional Questions   Accompanied by na         8/21/2023     6:50 AM   Patient Reported Additional Medications   Patient reports taking the following new medications none     Are you in the first 12 months of your Medicare coverage?  No    Healthy Habits:     In general, how would you rate your overall health?  Good    Frequency of exercise:  None    Do you usually eat at least 4 servings of fruit and vegetables a day, include whole grains    & fiber and avoid regularly eating high fat or \"junk\" foods?  No    Taking medications regularly:  Yes    Medication side effects:  None    Ability to successfully perform activities of daily living:  No assistance needed    Home Safety:  Throw rugs in the hallway    Hearing Impairment:  Difficulty understanding soft or whispered speech    In the past 6 months, have you been bothered by leaking of urine?  No    In general, how would you rate your overall mental or emotional health?  Poor    Additional concerns today:  Yes      Having issues with short term memory.   Has flashes of his long term memory. No issues with long term memory.   Not getting lost driving.     Had cataract surgery which has been helpful. On drops for 2 weeks. Has two week f/up this week since surgery.     Sore throat, right ear ache, thick postnasal drainage, right nostril drainage. Typically gets right sided sinus infection. Has been present for >1 week. Hard to get rid of post-nasal drainage. Some sneezing. For a long time it feels like his throat  \"collects food\" on the right side and this causes him to cough. At last visit we increased his omeprazole dose which helped abdominal pain/gerd a lot but didn't help the swallowing issue. Still has to be careful about type of food he eats (onions cause gerd). Hasn't had esophageal spasms in awhile.     ADOLFO - uses CPAP    HTN - declines medication     Diabetes " - declines medication   Eats candy and almonds graze     Hx of colonic polyps recommended 5 year follow up - declines any further colonoscopies     Doesn't exercise. Golfed once this summer, bowling once    Lab Results   Component Value Date    A1C 8.3 2023    A1C 8.9 2023    A1C 6.7 2022    A1C 6.6 2021    A1C 6.7 2021    A1C 5.8 10/31/2019    A1C 5.4 2018    A1C 5.7 10/16/2015    A1C 5.3 2014           Have you ever done Advance Care Planning? (For example, a Health Directive, POLST, or a discussion with a medical provider or your loved ones about your wishes): Yes, advance care planning is on file.      Fall risk  Fallen 2 or more times in the past year?: No  Any fall with injury in the past year?: No    Cognitive Screening   1) Repeat 3 items (Leader, Season, Table)    2) Clock draw: NORMAL  3) 3 item recall: Recalls 3 objects  Results: 3 items recalled: COGNITIVE IMPAIRMENT LESS LIKELY    Mini-CogTM Copyright S Sharon. Licensed by the author for use in [facility]; reprinted with permission (stew@[facility]). All rights reserved.      Do you have sleep apnea, excessive snoring or daytime drowsiness? : yes    Reviewed and updated as needed this visit by clinical staff   Tobacco  Allergies  Meds              Reviewed and updated as needed this visit by Provider                 Social History     Tobacco Use     Smoking status: Former     Packs/day: 1.00     Years: 35.00     Pack years: 35.00     Types: Cigarettes, Cigars, Pipe     Start date: 1963     Quit date: 10/13/1998     Years since quittin.8     Smokeless tobacco: Never   Substance Use Topics     Alcohol use: Yes     Comment: about a beer a week            2023    11:30 AM   Alcohol Use   Prescreen: >3 drinks/day or >7 drinks/week? No     Do you have a current opioid prescription? No  Do you use any other controlled substances or medications that are not prescribed by a provider?  None    Current providers sharing in care for this patient include:   Patient Care Team:  Gabbi Gomez DO as PCP - General (Family Medicine)  Charlie Wooten MD as MD (Orthopedics)  Addy Juan MD as MD (Urology)  Frannie Talvaera MD as Assigned PCP    The following health maintenance items are reviewed in Epic and correct as of today:  Health Maintenance   Topic Date Due     COVID-19 Vaccine (6 - Pfizer series) 01/29/2023     DIABETIC FOOT EXAM  04/21/2023     COLORECTAL CANCER SCREENING  07/19/2023     INFLUENZA VACCINE (1) 09/01/2023     BMP  10/18/2023     A1C  10/20/2023     PHQ-9  02/21/2024     LIPID  04/18/2024     MICROALBUMIN  04/18/2024     PSA  04/18/2024     EYE EXAM  07/05/2024     ANNUAL REVIEW OF HM ORDERS  07/20/2024     MEDICARE ANNUAL WELLNESS VISIT  08/21/2024     FALL RISK ASSESSMENT  08/21/2024     ADVANCE CARE PLANNING  08/21/2028     DTAP/TDAP/TD IMMUNIZATION (5 - Td or Tdap) 04/19/2033     HEPATITIS C SCREENING  Completed     Pneumococcal Vaccine: 65+ Years  Completed     ZOSTER IMMUNIZATION  Completed     DEPRESSION ACTION PLAN  Addressed     IPV IMMUNIZATION  Aged Out     MENINGITIS IMMUNIZATION  Aged Out     LUNG CANCER SCREENING  Discontinued     Lab work is in process  Labs reviewed in EPIC  BP Readings from Last 3 Encounters:   08/21/23 (!) 150/80   07/20/23 (!) 158/73   04/19/23 126/76    Wt Readings from Last 3 Encounters:   08/21/23 84.4 kg (186 lb)   07/20/23 84.6 kg (186 lb 6.4 oz)   04/19/23 81.6 kg (180 lb)                  Patient Active Problem List   Diagnosis     GERD (gastroesophageal reflux disease)     Esophageal spasms     CARDIOVASCULAR SCREENING; LDL GOAL LESS THAN 160     OA (OSTEOARTHRITIS) OF KNEE - left     Prostate cancer screening     ADOLFO (obstructive sleep apnea)- moderate (AHI 21)     Mantoux: positive     Hiatal hernia     Colon polyp     Diverticulosis of large intestine     Psychophysiologic insomnia      Nonalcoholic fatty liver disease     Type 2 diabetes mellitus with hyperglycemia, without long-term current use of insulin (H)     Mild major depression (H)     Past Surgical History:   Procedure Laterality Date     CARDIAC STRESS TST,COMPLETE       HC KNEE SCOPE,MED/LAT MENISECTOMY  11    left, with partial medial menisectomy ONLY     HERNIA REPAIR Right     [facility]: Nigel Poon D.O.       Social History     Tobacco Use     Smoking status: Former     Packs/day: 1.00     Years: 35.00     Pack years: 35.00     Types: Cigarettes, Cigars, Pipe     Start date: 1963     Quit date: 10/13/1998     Years since quittin.8     Smokeless tobacco: Never   Substance Use Topics     Alcohol use: Yes     Comment: about a beer a week      Family History   Problem Relation Age of Onset     Prostate Cancer Father      C.A.D. Father      Diabetes Paternal Grandfather      Hypertension No family hx of      Cancer No family hx of          Current Outpatient Medications   Medication Sig Dispense Refill     Acetaminophen (TYLENOL PO)        Ascorbic Acid (VITAMIN C CR PO) Take  by mouth.       aspirin 81 MG tablet Take 1 tablet by mouth 2 times daily        EPINEPHrine (ANY BX GENERIC EQUIV) 0.3 MG/0.3ML injection 2-pack Inject 0.3 mLs (0.3 mg) into the muscle as needed for anaphylaxis 2 each 3     IBUPROFEN PO        Multiple Minerals-Vitamins (CALCIUM-MAGNESIUM-ZINC-D3) TABS Take 1 tablet by mouth 2 times daily       Multiple Vitamin (MULTI-VITAMINS) TABS Take 1 tablet by mouth daily       omeprazole (PRILOSEC) 40 MG DR capsule TAKE 1 CAPSULE BY MOUTH ONCE DAILY. 90 capsule 0     order for DME CPAP 9 cm H20 1 Units 0     Allergies   Allergen Reactions     Septra [Sulfamethoxazole W-Trimethoprim]      Urinary symptoms     Sulfamethoxazole-Trimethoprim              Review of Systems   Constitutional:  Negative for chills and fever.   HENT:  Positive for ear pain and sore throat. Negative for congestion and hearing  "loss.    Eyes:  Positive for visual disturbance. Negative for pain.   Respiratory:  Positive for cough. Negative for shortness of breath.    Cardiovascular:  Negative for chest pain, palpitations and peripheral edema.   Gastrointestinal:  Negative for abdominal pain, constipation, diarrhea, heartburn, hematochezia and nausea.   Genitourinary:  Positive for frequency and urgency. Negative for dysuria, genital sores, hematuria, impotence and penile discharge.   Musculoskeletal:  Negative for arthralgias, joint swelling and myalgias.   Skin:  Negative for rash.   Neurological:  Negative for dizziness, weakness, headaches and paresthesias.   Psychiatric/Behavioral:  Positive for mood changes. The patient is not nervous/anxious.          OBJECTIVE:   BP (!) 150/80   Pulse 83   Temp 98.2  F (36.8  C)   Ht 1.778 m (5' 10\")   Wt 84.4 kg (186 lb)   SpO2 98%   BMI 26.69 kg/m   Estimated body mass index is 26.69 kg/m  as calculated from the following:    Height as of this encounter: 1.778 m (5' 10\").    Weight as of this encounter: 84.4 kg (186 lb).  Physical Exam  GENERAL: healthy, alert and no distress  EYES: Eyes grossly normal to inspection, PERRL and conjunctivae and sclerae normal  HENT: ear canals and TM's normal, nose and mouth without ulcers or lesions  NECK: no adenopathy, no asymmetry, masses, or scars and thyroid normal to palpation  RESP: lungs clear to auscultation - no rales, rhonchi or wheezes  CV: regular rate and rhythm, normal S1 S2, no S3 or S4, no murmur, click or rub, no peripheral edema and peripheral pulses strong  ABDOMEN: soft, nontender, no hepatosplenomegaly, no masses and bowel sounds normal  MS: no gross musculoskeletal defects noted, no edema  SKIN: no suspicious lesions or rashes  NEURO: Normal strength and tone, mentation intact and speech normal  PSYCH: mentation appears normal, affect normal/bright    Diagnostic Test Results:  Labs reviewed in Epic    ASSESSMENT / PLAN:   (Z00.00) " Encounter for Medicare annual wellness exam  (primary encounter diagnosis)  Comment: Patient declines any further colonoscopy (hx of polyps)    (E11.65) Type 2 diabetes mellitus with hyperglycemia, without long-term current use of insulin (H)  Comment:   Lab Results   Component Value Date    A1C 8.3 07/20/2023    A1C 8.9 04/18/2023    A1C 6.7 04/21/2022    A1C 6.6 08/05/2021    A1C 6.7 04/16/2021    A1C 5.8 10/31/2019    A1C 5.4 11/07/2018    A1C 5.7 10/16/2015    A1C 5.3 09/24/2014     A1c is improving but not at goal. Not working on diet or exercise. Discussed medication again  but pt continues to decline treatment and understands risks. Encourage low glycemic diet. 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 Recheck in 3-6 months with next A1c    (I10) Primary hypertension  Comment: BP elevated again twice on arrival but did improve during visit. He declines treatment. Check BMP  Plan: Basic metabolic panel  (Ca, Cl, CO2, Creat,         Gluc, K, Na, BUN)            (K21.9) Gastroesophageal reflux disease without esophagitis  Comment: was increased to 40mg this spring with improvement, recommend decreasing back to 20 mg daily and monitor   Plan: CBC with platelets and differential, omeprazole        (PRILOSEC) 20 MG DR capsule            (Z12.11) Screen for colon cancer  Comment: pt declines further colon cancer screening     (R41.3) Memory change  Comment: pt noting short term memory decline. Recommend checking folate, B12 and MRI brain. We will refer for neuropsych testing.   Plan: Folate, Vitamin B12, MR Brain w/o & w Contrast,        Adult Neuropsychology Referral            (R13.10) Dysphagia, unspecified type  Comment: pt endorses food getting  "stuck in his throat daily that only comes up with cough/sneezing, primarily nuts. We will continue prilosec and refer for EGD   Plan: Adult GI  Referral - Procedure Only,         CBC with platelets and differential, omeprazole        (PRILOSEC) 20 MG DR capsule            (J01.00) Acute non-recurrent maxillary sinusitis  Comment: symptoms at least for 1 week so we will treat his sinusitis.   Plan: CBC with platelets and differential,         amoxicillin-clavulanate (AUGMENTIN) 875-125 MG         tablet            (K22.4) Esophageal spasms  Comment: could be contributing to his dysphagia but symptoms different now compared to previously when had more spasms.   Plan: Adult GI  Referral - Procedure Only            Patient has been advised of split billing requirements and indicates understanding: Yes      COUNSELING:  Reviewed preventive health counseling, as reflected in patient instructions       Regular exercise       Vision screening       Colon cancer screening       Prostate cancer screening      BMI:   Estimated body mass index is 26.69 kg/m  as calculated from the following:    Height as of this encounter: 1.778 m (5' 10\").    Weight as of this encounter: 84.4 kg (186 lb).         He reports that he quit smoking about 24 years ago. His smoking use included cigarettes, cigars, and pipe. He started smoking about 60 years ago. He has a 35.00 pack-year smoking history. He has never used smokeless tobacco.      Appropriate preventive services were discussed with this patient, including applicable screening as appropriate for cardiovascular disease, diabetes, osteopenia/osteoporosis, and glaucoma.  As appropriate for age/gender, discussed screening for colorectal cancer, prostate cancer, breast cancer, and cervical cancer. Checklist reviewing preventive services available has been given to the patient.    Reviewed patients plan of care and provided an AVS. The Basic Care Plan (routine screening as " documented in Health Maintenance) for Garrett meets the Care Plan requirement. This Care Plan has been established and reviewed with the Patient.          Gabbi Gomez DO  [facility]    Identified Health Risks:  I have reviewed Opioid Use Disorder and Substance Use Disorder risk factors and made any needed referrals.

## 2023-08-22 DIAGNOSIS — D64.9 ANEMIA, UNSPECIFIED TYPE: Primary | ICD-10-CM

## 2023-08-22 NOTE — TELEPHONE ENCOUNTER
RN replied to patient via [facility] Inchart. See message for details.     Aj Montana RN, BSN, PHN  [facility]: [place]

## 2023-08-23 ENCOUNTER — TELEPHONE (OUTPATIENT)
Dept: FAMILY MEDICINE | Facility: CLINIC | Age: 78
End: 2023-08-23
Payer: COMMERCIAL

## 2023-08-31 ENCOUNTER — TRANSFERRED RECORDS (OUTPATIENT)
Dept: HEALTH INFORMATION MANAGEMENT | Facility: CLINIC | Age: 78
End: 2023-08-31

## 2023-08-31 ENCOUNTER — ANCILLARY PROCEDURE (OUTPATIENT)
Dept: MRI IMAGING | Facility: CLINIC | Age: 78
End: 2023-08-31
Attending: STUDENT IN AN ORGANIZED HEALTH CARE EDUCATION/TRAINING PROGRAM
Payer: COMMERCIAL

## 2023-08-31 DIAGNOSIS — R41.3 MEMORY CHANGE: ICD-10-CM

## 2023-08-31 DIAGNOSIS — R41.3 MEMORY CHANGE: Primary | ICD-10-CM

## 2023-08-31 PROCEDURE — A9585 GADOBUTROL INJECTION: HCPCS | Performed by: RADIOLOGY

## 2023-08-31 PROCEDURE — 70553 MRI BRAIN STEM W/O & W/DYE: CPT | Performed by: RADIOLOGY

## 2023-08-31 RX ORDER — GADOBUTROL 604.72 MG/ML
8 INJECTION INTRAVENOUS ONCE
Status: COMPLETED | OUTPATIENT
Start: 2023-08-31 | End: 2023-08-31

## 2023-08-31 RX ADMIN — GADOBUTROL 8 ML: 604.72 INJECTION INTRAVENOUS at 15:59

## 2023-09-02 ENCOUNTER — TRANSFERRED RECORDS (OUTPATIENT)
Dept: HEALTH INFORMATION MANAGEMENT | Facility: CLINIC | Age: 78
End: 2023-09-02

## 2023-09-21 ENCOUNTER — TELEPHONE (OUTPATIENT)
Dept: NEUROPSYCHOLOGY | Facility: CLINIC | Age: 78
End: 2023-09-21
Payer: COMMERCIAL

## 2023-09-21 NOTE — TELEPHONE ENCOUNTER
Left Voicemail (1st Attempt) and Sent Mychart (1st Attempt) for the patient to call back and schedule the following:    Appointment type: Neuropsychology Referral   Provider: None   Return date: First Available   Specialty phone number: [phone]  Additional appointment(s) needed: N/A  Additonal Notes: LVM, and sent MyC

## 2023-10-04 NOTE — PROGRESS NOTES
Assessment & Plan     Lateral epicondylitis of right elbow  - Currently treating with massage, acupuncture, and oral NSAIDs which has been ineffective  - Previously referred to physical therapy and I encouraged him to go, but he's convinced that won't work   - Advised wearing a wrist brace 24/7 except for shower time and using Voltaren gel QID  - Wrist/Arm/Hand Supplies Order for DME - ONLY FOR DME  - diclofenac (VOLTAREN) 1 % topical gel; Apply 2 g to right elbow four times a day as needed for pain    Type 2 diabetes mellitus with hyperglycemia, without long-term current use of insulin (H)  - Well controlled   - Hemoglobin A1c  - Albumin Random Urine Quantitative with Creat Ratio      Return in 12 days (on 8/17/2021) for PCP visit. (already scheduled)    [facility]    ===================================================================    Subjective   Garrett is a 75 year old who presents for the following health issues:    HPI     Follow up on tennis elbow. Patient states it is still the same and some days are worse than others.    Patient was seen for this issue by another provider on 7/7/21.  Referred to physical therapy and recommended NSAIDs and rest.  Patient has been having pain in his right wrist and elbow since mid-June.  Started when he was splitting wood.  Has been getting massages and also tried accupuncture which hasn't helped so far.  Has been taking Tylenol and Ibuprofen PRN.  Tylenol doesn't help.  Ibuprofen helps.  Biofreeze-not sure if it helps.  He's not having pain if he's not moving it, but still bothering him a lot with any movement.        Review of Systems   Constitutional, HEENT, cardiovascular, pulmonary, gi and gu systems are negative, except as otherwise noted.      Objective    BP (!) 152/76 (BP Location: Right arm, Patient Position: Chair, Cuff Size: Adult Regular)   Pulse 76   Temp 98.4  F (36.9  C) (Oral)   Wt 84.4 kg (186 lb)    SpO2 98%   BMI 28.28 kg/m    Body mass index is 28.28 kg/m .  Physical Exam   GENERAL: healthy, alert and no distress  MS: +TTP over right lateral epicondyle.  +Pain with biceps flexion and forearm rotation.  Normal strength.  No swelling or erythema              Adult

## 2023-11-22 ENCOUNTER — OFFICE VISIT (OUTPATIENT)
Dept: FAMILY MEDICINE | Facility: CLINIC | Age: 78
End: 2023-11-22
Payer: COMMERCIAL

## 2023-11-22 VITALS
DIASTOLIC BLOOD PRESSURE: 86 MMHG | SYSTOLIC BLOOD PRESSURE: 160 MMHG | BODY MASS INDEX: 26.63 KG/M2 | WEIGHT: 186 LBS | TEMPERATURE: 98.6 F | HEART RATE: 93 BPM | OXYGEN SATURATION: 99 % | HEIGHT: 70 IN

## 2023-11-22 DIAGNOSIS — K21.9 GASTROESOPHAGEAL REFLUX DISEASE WITHOUT ESOPHAGITIS: ICD-10-CM

## 2023-11-22 DIAGNOSIS — J01.00 ACUTE NON-RECURRENT MAXILLARY SINUSITIS: Primary | ICD-10-CM

## 2023-11-22 DIAGNOSIS — H10.33 ACUTE BACTERIAL CONJUNCTIVITIS OF BOTH EYES: ICD-10-CM

## 2023-11-22 DIAGNOSIS — R13.10 DYSPHAGIA, UNSPECIFIED TYPE: ICD-10-CM

## 2023-11-22 DIAGNOSIS — R09.82 POSTNASAL DRIP: ICD-10-CM

## 2023-11-22 PROCEDURE — 99214 OFFICE O/P EST MOD 30 MIN: CPT | Performed by: STUDENT IN AN ORGANIZED HEALTH CARE EDUCATION/TRAINING PROGRAM

## 2023-11-22 RX ORDER — RESPIRATORY SYNCYTIAL VIRUS VACCINE 120MCG/0.5
0.5 KIT INTRAMUSCULAR ONCE
Qty: 1 EACH | Refills: 0 | Status: CANCELLED | OUTPATIENT
Start: 2023-11-22 | End: 2023-11-22

## 2023-11-22 RX ORDER — FLUTICASONE PROPIONATE 50 MCG
1 SPRAY, SUSPENSION (ML) NASAL DAILY
Qty: 16 G | Refills: 0 | Status: SHIPPED | OUTPATIENT
Start: 2023-11-22

## 2023-11-22 RX ORDER — CIPROFLOXACIN HYDROCHLORIDE 3.5 MG/ML
1-2 SOLUTION/ DROPS TOPICAL EVERY 4 HOURS
Qty: 3 ML | Refills: 0 | Status: SHIPPED | OUTPATIENT
Start: 2023-11-22 | End: 2024-07-15

## 2023-11-22 NOTE — PATIENT INSTRUCTIONS
Take the flonase nasal spray daily for a month until I follow up with you to see if this helps your drainage symptoms

## 2023-11-22 NOTE — PROGRESS NOTES
Assessment & Plan     Acute non-recurrent maxillary sinusitis  Recommend treatment with augmentin for sinusitis. If not improving let me knwo. Add flonase.   - amoxicillin-clavulanate (AUGMENTIN) 875-125 MG tablet; Take 1 tablet by mouth 2 times daily for 7 days    Acute bacterial conjunctivitis of both eyes  No signs of foreign body/eyelash in eye. No vision changes. Pain only from scratchy feeling when opening/closing eye. Treat for conjunctivitis. If not improving or any pain or vision changes be seen right away by optometirst   - ciprofloxacin (CILOXAN) 0.3 % ophthalmic solution; Place 1-2 drops into both eyes every 4 hours    Dysphagia, unspecified type  Gastroesophageal reflux disease without esophagitis  Postnasal drip  Symptoms haven't improved with higher dose of omeprazole, starting lower dose again. Reviewed EGD and some mucosal irritation from chemical/acid, but no strictures/diverticula . Continue low dose omeprazole and add flonase for 1 month, follow up at that time, may have component of chronic sinusiits? This wasn't noted onMRI brain. If symptoms persist recommend ENT evaluation   - fluticasone (FLONASE) 50 MCG/ACT nasal spray; Spray 1 spray into both nostrils daily      Gabbi Gomez DO  [facility]    Sarwat Tucker is a 77 year old, presenting for the following health issues:  Illness        11/22/2023     3:31 PM   Additional Questions   Accompanied by na         11/22/2023     3:31 PM   Patient Reported Additional Medications   Patient reports taking the following new medications none       HPI         Follow up GI results    Acute Illness  Acute illness concerns: Sinus  Onset/Duration: 1.5-2 weeks  Symptoms:  Fever: No  Chills/Sweats: No  Headache (location?): No  Sinus Pressure: No  Conjunctivitis:  YES right red eye due to other factors  Ear Pain: no  Rhinorrhea: YES due to eye problem.   bloody discharge this morning. Also uses nasal canal for  "CPAP  Congestion: Not  really  Sore Throat: No  Cough: YES due to junk in there.   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   Rhinorrhea. Coughing rare.   No headaches or facial pressure.       Eye(s) Problem  Onset/Duration: started today this morning  Description:   Location: right eye  Pain: scratchy when blinks  Redness: No  Accompanying Signs & Symptoms:  Discharge/mattering: No  Swelling: YES  Visual changes: No  Fever: No  Nasal Congestion: YES comes  and goes  Bothered by bright lights: No  History:  Trauma: No  Foreign body exposure: YES eye lash  Wearing contacts: No  Therapies tried and outcome:   Used eye drops to flush it out    Watery tearing of eye, rhinorrhea on right nostril, feels like something is scratching eye. Wonders if has an eyelash under eye.  Denies being around anyone with pink eye.        Gerd -- higher dose didn't notice any cahnge in dysphagia symptoms. EGD with some mucosal irritation. Started back on lower dose of omeprazole a couple days ago. Constant drainage in throat. Occasional coughs up food from what ate earlier             Review of Systems   Constitutional, HEENT, cardiovascular, pulmonary, gi and gu systems are negative, except as otherwise noted.      Objective    BP (!) 160/86 (Patient Position: Sitting, Cuff Size: Adult Regular)   Pulse 93   Temp 98.6  F (37  C) (Oral)   Ht 1.778 m (5' 10\")   Wt 84.4 kg (186 lb)   SpO2 99%   BMI 26.69 kg/m    Body mass index is 26.69 kg/m .  Physical Exam   GENERAL: healthy, alert and no distress  EYES: Eyes grossly normal to inspection, PERRL and conjunctivae and sclerae erythematous with watery tearing, small yellow discharge in lower eyelid, fluorescein light " without abrasion, no foreign bodies in eyelash,   HENT: ear canals and TM's normal, nares erythematous and edematous, and mouth without ulcers or lesions  NECK: anterior cervical adenopathy, no asymmetry, masses, or scars and thyroid normal to palpation  RESP: lungs clear to auscultation - no rales, rhonchi or wheezes  CV: regular rate and rhythm, normal S1 S2, no S3 or S4, no murmur, click or rub, no peripheral edema and peripheral pulses strong  ABDOMEN: soft, nontender, no hepatosplenomegaly, no masses and bowel sounds normal

## 2023-12-02 ENCOUNTER — HEALTH MAINTENANCE LETTER (OUTPATIENT)
Age: 78
End: 2023-12-02

## 2023-12-15 ENCOUNTER — OFFICE VISIT (OUTPATIENT)
Dept: FAMILY MEDICINE | Facility: CLINIC | Age: 78
End: 2023-12-15
Payer: COMMERCIAL

## 2023-12-15 VITALS
DIASTOLIC BLOOD PRESSURE: 74 MMHG | BODY MASS INDEX: 27.12 KG/M2 | WEIGHT: 189 LBS | TEMPERATURE: 97.8 F | OXYGEN SATURATION: 99 % | SYSTOLIC BLOOD PRESSURE: 128 MMHG | HEART RATE: 78 BPM

## 2023-12-15 DIAGNOSIS — R09.82 POSTNASAL DRIP: ICD-10-CM

## 2023-12-15 DIAGNOSIS — R79.89 ABNORMAL TSH: ICD-10-CM

## 2023-12-15 DIAGNOSIS — E11.65 TYPE 2 DIABETES MELLITUS WITH HYPERGLYCEMIA, WITHOUT LONG-TERM CURRENT USE OF INSULIN (H): Primary | ICD-10-CM

## 2023-12-15 DIAGNOSIS — R13.10 DYSPHAGIA, UNSPECIFIED TYPE: ICD-10-CM

## 2023-12-15 PROCEDURE — 99214 OFFICE O/P EST MOD 30 MIN: CPT | Performed by: STUDENT IN AN ORGANIZED HEALTH CARE EDUCATION/TRAINING PROGRAM

## 2023-12-15 RX ORDER — RESPIRATORY SYNCYTIAL VIRUS VACCINE 120MCG/0.5
0.5 KIT INTRAMUSCULAR ONCE
Qty: 1 EACH | Refills: 0 | Status: CANCELLED | OUTPATIENT
Start: 2023-12-15 | End: 2023-12-15

## 2023-12-15 RX ORDER — IPRATROPIUM BROMIDE 21 UG/1
2 SPRAY, METERED NASAL EVERY 12 HOURS
Qty: 30 ML | Refills: 0 | Status: SHIPPED | OUTPATIENT
Start: 2023-12-15 | End: 2024-01-25

## 2023-12-15 ASSESSMENT — PATIENT HEALTH QUESTIONNAIRE - PHQ9
SUM OF ALL RESPONSES TO PHQ QUESTIONS 1-9: 15
10. IF YOU CHECKED OFF ANY PROBLEMS, HOW DIFFICULT HAVE THESE PROBLEMS MADE IT FOR YOU TO DO YOUR WORK, TAKE CARE OF THINGS AT HOME, OR GET ALONG WITH OTHER PEOPLE: NOT DIFFICULT AT ALL
SUM OF ALL RESPONSES TO PHQ QUESTIONS 1-9: 15

## 2023-12-15 NOTE — PROGRESS NOTES
Assessment & Plan     Type 2 diabetes mellitus with hyperglycemia, without long-term current use of insulin (H)  Lab Results   Component Value Date    A1C 8.3 07/20/2023    A1C 8.9 04/18/2023    A1C 6.7 04/21/2022    A1C 6.6 08/05/2021    A1C 6.7 04/16/2021    A1C 5.8 10/31/2019    A1C 5.4 11/07/2018    A1C 5.7 10/16/2015    A1C 5.3 09/24/2014     Recheck A1c today or this week. He is not on medications and prefers not to be on medications. He is not working on low-carb diet. Discussed recommendation to start metformin if A1c remains above 8%. He will consider this. Discussed medication side effects    Postnasal drip  Dysphagia, unspecified type  He has chronic postnasal drainage and sinus infections at least annually; improved from recent sinus infection. He has to frequently clear his throat and this causes him to cough and gag regularly and have difficulty swallowing. He has tried flonase and antihistamines without relief. We will try atrovent and if not improving recommmend ENT eval  - Adult ENT  Referral; Future  - ipratropium (ATROVENT) 0.03 % nasal spray; Spray 2 sprays into both nostrils every 12 hours    Abnormal TSH  - TSH with free T4 reflex; Future    Gabbi Gomez [facility]    Sarwat Tucker is a 77 year old, presenting for the following health issues:  Follow Up        12/15/2023    10:00 AM   Additional Questions   Accompanied by wife         12/15/2023    10:00 AM   Patient Reported Additional Medications   Patient reports taking the following new medications none       History of Present Illness       Reason for visit:  Follow-up    He eats 0-1 servings of fruits and vegetables daily.He consumes 1 sweetened beverage(s) daily.He exercises with enough effort to increase his heart rate 9 or less minutes per day.  He exercises with enough effort to increase his heart rate 3 or less days per week.   He is taking medications regularly.     Sinus infection  follow up:   Was on Augmentin started 11/22/23.  Nasal discharge clear now; was yellow/green. Started getting yellow again then clear again. Two days ago had a sore throat on the right side and ear pain on right that only lasted one day. Wasn't associated with yawning/swallowing.     Does use flonase as well.   Has constant post-nasal drainage that has been present for a very long time.   Hx    Saw ophthalmologist -- had crystals in eyes and now on allergy eye drops.         Doesn't check blood sugars.   Not very hungry. Doesn't eat low carb.   Not interested in medication     Lab Results   Component Value Date    A1C 8.3 07/20/2023    A1C 8.9 04/18/2023    A1C 6.7 04/21/2022    A1C 6.6 08/05/2021    A1C 6.7 04/16/2021    A1C 5.8 10/31/2019    A1C 5.4 11/07/2018    A1C 5.7 10/16/2015    A1C 5.3 09/24/2014         Review of Systems   Constitutional, HEENT, cardiovascular, pulmonary, gi and gu systems are negative, except as otherwise noted.      Objective    /74   Pulse 78   Temp 97.8  F (36.6  C) (Oral)   Wt 85.7 kg (189 lb)   SpO2 99%   BMI 27.12 kg/m    Body mass index is 27.12 kg/m .  Physical Exam   GENERAL: healthy, alert and no distress  EYES: Eyes grossly normal to inspection, PERRL and conjunctivae and sclerae normal  HENT: ear canals and TM's normal, nose with ertyhema and mild edema, postnasal drainage, no erythema in post oropharynx and mouth without ulcers or lesions  NECK: no adenopathy, no asymmetry, masses, or scars and thyroid normal to palpation  RESP: lungs clear to auscultation - no rales, rhonchi or wheezes  CV: regular rate and rhythm, normal S1 S2, no S3 or S4, no murmur, click or rub, no peripheral edema and peripheral pulses strong

## 2023-12-19 ENCOUNTER — LAB (OUTPATIENT)
Dept: LAB | Facility: CLINIC | Age: 78
End: 2023-12-19
Payer: COMMERCIAL

## 2023-12-19 DIAGNOSIS — E11.65 TYPE 2 DIABETES MELLITUS WITH HYPERGLYCEMIA, WITHOUT LONG-TERM CURRENT USE OF INSULIN (H): Primary | ICD-10-CM

## 2023-12-19 DIAGNOSIS — R79.89 ABNORMAL TSH: ICD-10-CM

## 2023-12-19 DIAGNOSIS — D64.9 ANEMIA, UNSPECIFIED TYPE: ICD-10-CM

## 2023-12-19 DIAGNOSIS — R41.3 MEMORY CHANGE: ICD-10-CM

## 2023-12-19 LAB
BASOPHILS # BLD AUTO: 0 10E3/UL (ref 0–0.2)
BASOPHILS NFR BLD AUTO: 1 %
EOSINOPHIL # BLD AUTO: 0.2 10E3/UL (ref 0–0.7)
EOSINOPHIL NFR BLD AUTO: 4 %
ERYTHROCYTE [DISTWIDTH] IN BLOOD BY AUTOMATED COUNT: 14.2 % (ref 10–15)
FERRITIN SERPL-MCNC: 21 NG/ML (ref 31–409)
FOLATE SERPL-MCNC: 28.4 NG/ML (ref 4.6–34.8)
HBA1C MFR BLD: 7.6 % (ref 0–5.6)
HCT VFR BLD AUTO: 32.4 % (ref 40–53)
HGB BLD-MCNC: 10.4 G/DL (ref 13.3–17.7)
IMM GRANULOCYTES # BLD: 0 10E3/UL
IMM GRANULOCYTES NFR BLD: 0 %
LYMPHOCYTES # BLD AUTO: 1.4 10E3/UL (ref 0.8–5.3)
LYMPHOCYTES NFR BLD AUTO: 30 %
MCH RBC QN AUTO: 26.2 PG (ref 26.5–33)
MCHC RBC AUTO-ENTMCNC: 32.1 G/DL (ref 31.5–36.5)
MCV RBC AUTO: 82 FL (ref 78–100)
MONOCYTES # BLD AUTO: 0.3 10E3/UL (ref 0–1.3)
MONOCYTES NFR BLD AUTO: 7 %
NEUTROPHILS # BLD AUTO: 2.8 10E3/UL (ref 1.6–8.3)
NEUTROPHILS NFR BLD AUTO: 59 %
PLATELET # BLD AUTO: 159 10E3/UL (ref 150–450)
RBC # BLD AUTO: 3.97 10E6/UL (ref 4.4–5.9)
T4 FREE SERPL-MCNC: 0.92 NG/DL (ref 0.9–1.7)
TSH SERPL DL<=0.005 MIU/L-ACNC: 8.4 UIU/ML (ref 0.3–4.2)
WBC # BLD AUTO: 4.7 10E3/UL (ref 4–11)

## 2023-12-19 PROCEDURE — 84439 ASSAY OF FREE THYROXINE: CPT

## 2023-12-19 PROCEDURE — 84443 ASSAY THYROID STIM HORMONE: CPT

## 2023-12-19 PROCEDURE — 83036 HEMOGLOBIN GLYCOSYLATED A1C: CPT

## 2023-12-19 PROCEDURE — 82728 ASSAY OF FERRITIN: CPT

## 2023-12-19 PROCEDURE — 82746 ASSAY OF FOLIC ACID SERUM: CPT

## 2023-12-19 PROCEDURE — 85025 COMPLETE CBC W/AUTO DIFF WBC: CPT

## 2023-12-19 PROCEDURE — 36415 COLL VENOUS BLD VENIPUNCTURE: CPT

## 2023-12-21 ENCOUNTER — MYC MEDICAL ADVICE (OUTPATIENT)
Dept: FAMILY MEDICINE | Facility: CLINIC | Age: 78
End: 2023-12-21
Payer: COMMERCIAL

## 2023-12-22 NOTE — TELEPHONE ENCOUNTER
This has been a chronic issue. I will have him see if specialty care ent is in network     [facility] DO

## 2023-12-22 NOTE — TELEPHONE ENCOUNTER
Routing MyChart to PCP.  Is 4/10 too late for patient to see ENT?  Referral was placed on 12/15 for dysphagia.     Tha Eden, RN  Triage Nurse  [facility]

## 2024-01-09 ENCOUNTER — TRANSFERRED RECORDS (OUTPATIENT)
Dept: HEALTH INFORMATION MANAGEMENT | Facility: CLINIC | Age: 79
End: 2024-01-09

## 2024-01-24 DIAGNOSIS — R13.10 DYSPHAGIA, UNSPECIFIED TYPE: ICD-10-CM

## 2024-01-24 DIAGNOSIS — R09.82 POSTNASAL DRIP: ICD-10-CM

## 2024-01-25 ENCOUNTER — TRANSFERRED RECORDS (OUTPATIENT)
Dept: HEALTH INFORMATION MANAGEMENT | Facility: CLINIC | Age: 79
End: 2024-01-25
Payer: COMMERCIAL

## 2024-01-25 RX ORDER — IPRATROPIUM BROMIDE 21 UG/1
2 SPRAY, METERED NASAL EVERY 12 HOURS
Qty: 30 ML | Refills: 0 | Status: SHIPPED | OUTPATIENT
Start: 2024-01-25 | End: 2024-04-21

## 2024-01-30 ENCOUNTER — TRANSFERRED RECORDS (OUTPATIENT)
Dept: HEALTH INFORMATION MANAGEMENT | Facility: CLINIC | Age: 79
End: 2024-01-30
Payer: COMMERCIAL

## 2024-04-10 ENCOUNTER — OFFICE VISIT (OUTPATIENT)
Dept: OTOLARYNGOLOGY | Facility: CLINIC | Age: 79
End: 2024-04-10
Payer: COMMERCIAL

## 2024-04-10 VITALS
DIASTOLIC BLOOD PRESSURE: 77 MMHG | RESPIRATION RATE: 16 BRPM | HEART RATE: 85 BPM | OXYGEN SATURATION: 97 % | SYSTOLIC BLOOD PRESSURE: 161 MMHG

## 2024-04-10 DIAGNOSIS — J30.0 VASOMOTOR RHINITIS: ICD-10-CM

## 2024-04-10 DIAGNOSIS — R09.82 PND (POST-NASAL DRIP): Primary | ICD-10-CM

## 2024-04-10 PROCEDURE — 99203 OFFICE O/P NEW LOW 30 MIN: CPT | Performed by: OTOLARYNGOLOGY

## 2024-04-10 RX ORDER — IPRATROPIUM BROMIDE 42 UG/1
2 SPRAY, METERED NASAL 4 TIMES DAILY PRN
Qty: 15 ML | Refills: 11 | Status: SHIPPED | OUTPATIENT
Start: 2024-04-10

## 2024-04-10 RX ORDER — MUPIROCIN 20 MG/G
OINTMENT TOPICAL 3 TIMES DAILY
COMMUNITY
End: 2024-07-15

## 2024-04-10 NOTE — PROGRESS NOTES
Chief Complaint - troubles swallowing, postnasal drainage    History of Present Illness - Garrett Del Rio is a 78 year old male who presents with a history of excess mucous. He feels mucous comes down into back of throat, gets sticky, causes cough and sneezing. Food sometimes catches. He gets violent sneezes. He will get violent sneezes. No pain. He thinks it is coming from his nose. No odynaphagia. Voicing has seemed mostly normal. They note a history of relux. Currently, the patient is taking 8/21/23. No cough or hemoptysis. Nonsmoker. He tried bactroban 2-3 times a day. He has used atrovent 0.3%, and this has helped the most. Another ENT scoped him and didn't see anything abnormal.     He uses a CPAP.    Tests personally reviewed today for this visit:   1.) 8/31/23 EGD reviewed and this showed no pathology, no diverticulum. He did have dilation empirically to 51 Fr.   2.) AHI 22.6 on sleep study from the outside he brought.     Past Medical History -   Patient Active Problem List   Diagnosis    GERD (gastroesophageal reflux disease)    Esophageal spasms    CARDIOVASCULAR SCREENING; LDL GOAL LESS THAN 160    OA (OSTEOARTHRITIS) OF KNEE - left    Prostate cancer screening    ADOLFO (obstructive sleep apnea)- moderate (AHI 21)    Mantoux: positive    Hiatal hernia    Colon polyp    Diverticulosis of large intestine    Psychophysiologic insomnia    Nonalcoholic fatty liver disease    Type 2 diabetes mellitus with hyperglycemia, without long-term current use of insulin (H)    Mild major depression (H24)       Current Medications -   Current Outpatient Medications:     Acetaminophen (TYLENOL PO), , Disp: , Rfl:     Ascorbic Acid (VITAMIN C CR PO), Take  by mouth., Disp: , Rfl:     aspirin 81 MG tablet, Take 1 tablet by mouth 2 times daily , Disp: , Rfl:     ciprofloxacin (CILOXAN) 0.3 % ophthalmic solution, Place 1-2 drops into both eyes every 4 hours, Disp: 3 mL, Rfl: 0    EPINEPHrine (ANY BX GENERIC EQUIV) 0.3 MG/0.3ML  injection 2-pack, Inject 0.3 mLs (0.3 mg) into the muscle as needed for anaphylaxis, Disp: 2 each, Rfl: 3    fluticasone (FLONASE) 50 MCG/ACT nasal spray, Spray 1 spray into both nostrils daily, Disp: 16 g, Rfl: 0    IBUPROFEN PO, , Disp: , Rfl:     ipratropium (ATROVENT) 0.03 % nasal spray, Spray 2 sprays into both nostrils every 12 hours, Disp: 30 mL, Rfl: 0    Multiple Minerals-Vitamins (CALCIUM-MAGNESIUM-ZINC-D3) TABS, Take 1 tablet by mouth 2 times daily, Disp: , Rfl:     Multiple Vitamin (MULTI-VITAMINS) TABS, Take 1 tablet by mouth daily, Disp: , Rfl:     omeprazole (PRILOSEC) 20 MG DR capsule, Take 1 capsule (20 mg) by mouth daily, Disp: 90 capsule, Rfl: 3    order for DME, CPAP 9 cm H20, Disp: 1 Units, Rfl: 0    Allergies -   Allergies   Allergen Reactions    Septra [Sulfamethoxazole W-Trimethoprim]      Urinary symptoms    Sulfamethoxazole-Trimethoprim        Social History -   Social History     Socioeconomic History    Marital status:    Occupational History    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 No     Social Determinants of Health     Financial Resource Strain: Low Risk  (2023)    Financial Resource Strain     Within the past 12 months, have you or your family members you live with been unable to get utilities (heat, electricity) when it was really needed?:  No   Food Insecurity: Low Risk  (12/8/2023)    Food Insecurity     Within the past 12 months, did you worry that your food would run out before you got money to buy more?: No     Within the past 12 months, did the food you bought just not last and you didn t have money to get more?: No   Transportation Needs: Low Risk  (12/8/2023)    Transportation Needs     Within the past 12 months, has lack of transportation kept you from medical appointments, getting your medicines, non-medical meetings or appointments, work, or from getting things that you need?: No   Housing Stability: Low Risk  (12/8/2023)    Housing Stability     Do you have housing? : Yes     Are you worried about losing your housing?: No       Family History -   Family History   Problem Relation Age of Onset    Prostate Cancer Father     C.A.D. Father     Diabetes Paternal Grandfather     Hypertension No family hx of     Cancer No family hx of        Physical Exam  General - The patient is in no distress. Alert, answers questions and cooperates with examination appropriately.   Voice and Breathing - The patient was breathing comfortably without the use of accessory muscles. There was no wheezing, stridor, or stertor.  The patients voice was clear and strong.  Eyes - Extraocular movements intact.  Sclera were not icteric or injected, conjunctiva were pink and moist.  Mouth - Examination of the oral cavity showed pink, healthy oral mucosa. No lesions or ulcerations noted.  The tongue was mobile and midline.  Throat - The walls of the oropharynx were smooth, symmetric, and had no lesions or ulcerations.  The tonsillar pillars and soft palate were symmetric.  The uvula was midline on elevation. Clear postnasal drainage.   Nose - External contour is symmetric, no gross deflection or scars.  Nasal mucosa is pink and moist with no abnormal mucus.  The septum was midline and non-obstructive, turbinates of normal size and position.  No polyps, masses, or purulence  noted on examination.  Neck -  Soft, non-tender. Palpation of the occipital, submental, submandibular, internal jugular chain, and supraclavicular nodes did not demonstrate any abnormal lymph nodes or masses. No parotid masses. Palpation of the thyroid was soft and smooth, with no nodules or goiter appreciated.  The trachea was mobile and midline.  Neurologic - CN II-XII are grossly intact, no focal neurologic deficits.       A/P -     ICD-10-CM    1. PND (post-nasal drip)  R09.82 ipratropium (ATROVENT) 0.06 % nasal spray      2. Vasomotor rhinitis  J30.0           Garrett Del Rio is a 78 year old male with postnasal drainage likely due to vasomotor rhinitis. I think this can be treated with atrovent 0.06% since he feels the 0.03% helped. He wasn't enthusiastic about using nasal saline irrigations.         Dr. Kashif Perry  Otolaryngology  [facility]

## 2024-04-10 NOTE — LETTER
4/10/2024         RE: Garrett Del Rio  [address]  [place] [address]        Dear Colleague,    Thank you for referring your patient, Garrett Del Rio, to the [facility]. Please see a copy of my visit note below.    Chief Complaint - troubles swallowing, postnasal drainage    History of Present Illness - Garrett Del Rio is a 78 year old male who presents with a history of excess mucous. He feels mucous comes down into back of throat, gets sticky, causes cough and sneezing. Food sometimes catches. He gets violent sneezes. He will get violent sneezes. No pain. He thinks it is coming from his nose. No odynaphagia. Voicing has seemed mostly normal. They note a history of relux. Currently, the patient is taking 8/21/23. No cough or hemoptysis. Nonsmoker. He tried bactroban 2-3 times a day. He has used atrovent 0.3%, and this has helped the most. Another ENT scoped him and didn't see anything abnormal.     He uses a CPAP.    Tests personally reviewed today for this visit:   1.) 8/31/23 EGD reviewed and this showed no pathology, no diverticulum. He did have dilation empirically to 51 Fr.   2.) AHI 22.6 on sleep study from the outside he brought.     Past Medical History -   Patient Active Problem List   Diagnosis     GERD (gastroesophageal reflux disease)     Esophageal spasms     CARDIOVASCULAR SCREENING; LDL GOAL LESS THAN 160     OA (OSTEOARTHRITIS) OF KNEE - left     Prostate cancer screening     ADOLFO (obstructive sleep apnea)- moderate (AHI 21)     Mantoux: positive     Hiatal hernia     Colon polyp     Diverticulosis of large intestine     Psychophysiologic insomnia     Nonalcoholic fatty liver disease     Type 2 diabetes mellitus with hyperglycemia, without long-term current use of insulin (H)     Mild major depression (H24)       Current Medications -   Current Outpatient Medications:      Acetaminophen (TYLENOL PO), , Disp: , Rfl:      Ascorbic Acid (VITAMIN C CR PO), Take  by mouth.,  Disp: , Rfl:      aspirin 81 MG tablet, Take 1 tablet by mouth 2 times daily , Disp: , Rfl:      ciprofloxacin (CILOXAN) 0.3 % ophthalmic solution, Place 1-2 drops into both eyes every 4 hours, Disp: 3 mL, Rfl: 0     EPINEPHrine (ANY BX GENERIC EQUIV) 0.3 MG/0.3ML injection 2-pack, Inject 0.3 mLs (0.3 mg) into the muscle as needed for anaphylaxis, Disp: 2 each, Rfl: 3     fluticasone (FLONASE) 50 MCG/ACT nasal spray, Spray 1 spray into both nostrils daily, Disp: 16 g, Rfl: 0     IBUPROFEN PO, , Disp: , Rfl:      ipratropium (ATROVENT) 0.03 % nasal spray, Spray 2 sprays into both nostrils every 12 hours, Disp: 30 mL, Rfl: 0     Multiple Minerals-Vitamins (CALCIUM-MAGNESIUM-ZINC-D3) TABS, Take 1 tablet by mouth 2 times daily, Disp: , Rfl:      Multiple Vitamin (MULTI-VITAMINS) TABS, Take 1 tablet by mouth daily, Disp: , Rfl:      omeprazole (PRILOSEC) 20 MG DR capsule, Take 1 capsule (20 mg) by mouth daily, Disp: 90 capsule, Rfl: 3     order for DME, CPAP 9 cm H20, Disp: 1 Units, Rfl: 0    Allergies -   Allergies   Allergen Reactions     Septra [Sulfamethoxazole W-Trimethoprim]      Urinary symptoms     Sulfamethoxazole-Trimethoprim        Social History -   Social History     Socioeconomic History     Marital status:    Occupational History     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 No     Social Determinants of Health     Financial Resource Strain: Low Risk  (12/8/2023)    Financial Resource Strain      Within the past 12 months, have you or your family members you live with been unable to get utilities (heat, electricity) when it was really needed?: No   Food Insecurity: Low Risk  (12/8/2023)    Food Insecurity      Within the past 12 months, did you worry that your food would run out before you got money to buy more?: No      Within the past 12 months, did the food you bought just not last and you didn t have money to get more?: No   Transportation Needs: Low Risk  (12/8/2023)    Transportation Needs      Within the past 12 months, has lack of transportation kept you from medical appointments, getting your medicines, non-medical meetings or appointments, work, or from getting things that you need?: No   Housing Stability: Low Risk  (12/8/2023)    Housing Stability      Do you have housing? : Yes      Are you worried about losing your housing?: No       Family History -   Family History   Problem Relation Age of Onset     Prostate Cancer Father      C.A.D. Father      Diabetes Paternal Grandfather      Hypertension No family hx of      Cancer No family hx of        Physical Exam  General - The patient is in no distress. Alert, answers questions and cooperates with examination appropriately.   Voice and Breathing - The patient was breathing comfortably without the use of accessory muscles. There was no wheezing, stridor, or stertor.  The patients voice was clear and strong.  Eyes - Extraocular movements intact.  Sclera were not icteric or injected, conjunctiva were pink and moist.  Mouth - Examination of the oral cavity showed pink, healthy oral mucosa. No lesions or ulcerations noted.  The tongue was mobile and midline.  Throat - The walls of the oropharynx were smooth, symmetric, and had no lesions or ulcerations.  The tonsillar pillars and soft palate were symmetric.  The uvula was  midline on elevation. Clear postnasal drainage.   Nose - External contour is symmetric, no gross deflection or scars.  Nasal mucosa is pink and moist with no abnormal mucus.  The septum was midline and non-obstructive, turbinates of normal size and position.  No polyps, masses, or purulence noted on examination.  Neck -  Soft, non-tender. Palpation of the occipital, submental, submandibular, internal jugular chain, and supraclavicular nodes did not demonstrate any abnormal lymph nodes or masses. No parotid masses. Palpation of the thyroid was soft and smooth, with no nodules or goiter appreciated.  The trachea was mobile and midline.  Neurologic - CN II-XII are grossly intact, no focal neurologic deficits.       A/P -     ICD-10-CM    1. PND (post-nasal drip)  R09.82 ipratropium (ATROVENT) 0.06 % nasal spray      2. Vasomotor rhinitis  J30.0           Garrett Del Rio is a 78 year old male with postnasal drainage likely due to vasomotor rhinitis. I think this can be treated with atrovent 0.06% since he feels the 0.03% helped. He wasn't enthusiastic about using nasal saline irrigations.         Dr. Kashif Perry  Otolaryngology  [facility]        Again, thank you for allowing me to participate in the care of your patient.        Sincerely,        Kashif Perry MD

## 2024-04-20 ENCOUNTER — HEALTH MAINTENANCE LETTER (OUTPATIENT)
Age: 79
End: 2024-04-20

## 2024-04-21 ENCOUNTER — OFFICE VISIT (OUTPATIENT)
Dept: URGENT CARE | Facility: URGENT CARE | Age: 79
End: 2024-04-21
Payer: COMMERCIAL

## 2024-04-21 VITALS
DIASTOLIC BLOOD PRESSURE: 69 MMHG | HEART RATE: 97 BPM | SYSTOLIC BLOOD PRESSURE: 151 MMHG | TEMPERATURE: 97.3 F | WEIGHT: 180 LBS | BODY MASS INDEX: 25.83 KG/M2 | RESPIRATION RATE: 24 BRPM | OXYGEN SATURATION: 96 %

## 2024-04-21 DIAGNOSIS — J02.9 SORE THROAT: ICD-10-CM

## 2024-04-21 DIAGNOSIS — J32.9 SINUSITIS, UNSPECIFIED CHRONICITY, UNSPECIFIED LOCATION: Primary | ICD-10-CM

## 2024-04-21 LAB
DEPRECATED S PYO AG THROAT QL EIA: NEGATIVE
GROUP A STREP BY PCR: NOT DETECTED

## 2024-04-21 PROCEDURE — 87651 STREP A DNA AMP PROBE: CPT

## 2024-04-21 PROCEDURE — 99213 OFFICE O/P EST LOW 20 MIN: CPT

## 2024-04-21 RX ORDER — DOXYCYCLINE HYCLATE 100 MG
100 TABLET ORAL 2 TIMES DAILY
Qty: 14 TABLET | Refills: 0 | Status: SHIPPED | OUTPATIENT
Start: 2024-04-21 | End: 2024-04-28

## 2024-04-21 NOTE — PROGRESS NOTES
ASSESSMENT:   (J32.9) Sinusitis, unspecified chronicity, unspecified location  (primary encounter diagnosis)  Plan: doxycycline hyclate (VIBRA-TABS) 100 MG tablet    (J02.9) Sore throat  Plan: Streptococcus A Rapid Screen w/Reflex to PCR,         Group A Streptococcus PCR Throat Swab    PLAN:  Informed the patient that strep test is negative and that his symptoms are likely related to sinus infection given the worsening nasal congestion, runny nose and drainage color/consistency.  Patient also has risk factors for developing sinus infection given his CPAP usage.  We discussed taking the antibiotic as prescribed and finish the full course even if symptoms improve.  We also discussed avoiding taking his multivitamin and other vitamins while he is taking the antibiotic due to the effects of the antibiotic absorption.  Informed the patient to try yogurt with active cultures or probiotic such as Culturelle daily prevent diarrhea will take the antibiotic.  We discussed trying warm salt water gargles, hot/warm water or tea with honey and/or lemon and or Cepacol lozenges or spray for the sore throat.  Informed the patient to follow-up with his primary care provider or ENT should symptoms persist.  Patient acknowledged his understanding of the above plan.    The use of Dragon/[employer] dictation services may have been used to construct the content in this note; any grammatical or spelling errors are non-intentional. Please contact the author of this note directly if you are in need of any clarification.      RODRIGO Vences CNP      SUBJECTIVE:   Garrett Del Rio  is a 78 year old male who is here today because of: Sore Throat.  The patient has had additional symptoms of nasal congestion/runny nose.  He states the mucous characteristics of the nasal drainage have gotten thicker and worse over the past week.  He indicates he has been using the ipratropium nasal spray from the ENT which has not resolved his symptoms.     Onset of symptoms was 1 week ago. Course of illness is worsening.  Patient reports using a CPAP every night as well.   Patient denies cough, earache, vomiting, and diarrhea  Treatment measures tried include ipratropium nasal spray and Chlorseptic spray.    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      ROS:  Negative except noted above.      OBJECTIVE:   BP (!) 151/69   Pulse 97   Temp 97.3  F (36.3  C) (Tympanic)   Resp 24   Wt 81.6 kg (180 lb)   SpO2 96%   BMI 25.83 kg/m    General: healthy, alert and no distress  Eyes - conjunctivae clear.  Nose/Sinuses - Positive findings: Bilateral edematous and erythematous mucosa. No drainage or sinus tenderness.  Oropharynx - Lips, mucosa, and tongue normal. Positive findings: oropharyngeal erythema  Neck - Neck supple; no lymphadenopathy  Lungs - Lungs clear; no wheezing or rales.  Heart - regular rate and rhythm. No murmurs, rub.    Labs:  Rapid Strep test is negative; await throat culture results.

## 2024-04-21 NOTE — PATIENT INSTRUCTIONS
Strep test is negative. Take the antibiotic as prescribed and finish the full course even if symptoms improve.   Avoid taking your multivitamin and other vitamins while you are taking the antibiotic.  Try yogurt with active cultures or probiotics such as Culturelle daily to help prevent diarrhea while using antibiotics.  You can also try warm salt water gargles, hot/warm water or tea with honey and/or lemon and/or Cepacol lozenges or spray for your sore throat.  Follow up with your primary care provider or ENT should symptoms persist.

## 2024-05-28 ENCOUNTER — OFFICE VISIT (OUTPATIENT)
Dept: URGENT CARE | Facility: URGENT CARE | Age: 79
End: 2024-05-28
Payer: COMMERCIAL

## 2024-05-28 VITALS
WEIGHT: 181.8 LBS | SYSTOLIC BLOOD PRESSURE: 166 MMHG | OXYGEN SATURATION: 97 % | HEART RATE: 87 BPM | BODY MASS INDEX: 26.09 KG/M2 | RESPIRATION RATE: 16 BRPM | TEMPERATURE: 97.6 F | DIASTOLIC BLOOD PRESSURE: 72 MMHG

## 2024-05-28 DIAGNOSIS — A69.20 ERYTHEMA MIGRANS (LYME DISEASE): Primary | ICD-10-CM

## 2024-05-28 DIAGNOSIS — E11.65 TYPE 2 DIABETES MELLITUS WITH HYPERGLYCEMIA, WITHOUT LONG-TERM CURRENT USE OF INSULIN (H): ICD-10-CM

## 2024-05-28 PROCEDURE — 99214 OFFICE O/P EST MOD 30 MIN: CPT | Performed by: PHYSICIAN ASSISTANT

## 2024-05-28 PROCEDURE — 86618 LYME DISEASE ANTIBODY: CPT | Performed by: PHYSICIAN ASSISTANT

## 2024-05-28 PROCEDURE — 36415 COLL VENOUS BLD VENIPUNCTURE: CPT | Performed by: PHYSICIAN ASSISTANT

## 2024-05-28 RX ORDER — DOXYCYCLINE 100 MG/1
100 CAPSULE ORAL 2 TIMES DAILY
Qty: 60 CAPSULE | Refills: 0 | Status: SHIPPED | OUTPATIENT
Start: 2024-05-28 | End: 2024-06-27

## 2024-05-28 ASSESSMENT — PAIN SCALES - GENERAL: PAINLEVEL: NO PAIN (0)

## 2024-05-28 NOTE — PATIENT INSTRUCTIONS
Stop multivitamin for 10 days. Then restart but take in between doses of Doxycyline.  Follow up with Primary Provider in 4-6 weeks for repeat L[place] testing.

## 2024-05-28 NOTE — PROGRESS NOTES
Chief Complaint   Patient presents with    Insect Bites     Patient reports various tick bites from this weekend. Patient's wife removed all ticks.            ASSESSMENT:    ICD-10-CM    1. Erythema migrans (Lyme disease)  A69.20 doxycycline hyclate (VIBRAMYCIN) 100 MG capsule     Lyme Disease Total Abs Bld with Reflex to Confirm CLIA     Lyme Disease Total Abs Bld with Reflex to Confirm CLIA      2. Type 2 diabetes mellitus with hyperglycemia, without long-term current use of insulin (H)  E11.65         PLAN: Erythema migrans.  Treatment should likely be 21 to 28 days.  Given prescription for doxycycline.  Do not take his multivitamin for the first 10 days but then can restart it but take it between doses of doxycycline.  Be aware of sun sensitivity.  Baseline Lyme's test today.  Follow-up with primary care provider 4 weeks for evaluation and repeat Lyme's testing.   Advised about symptoms which might herald more serious problems.        Frannie Narvaez PA-C         SUBJECTIVE:   78 year old male who presents with 3 deer tick bites over the weekend.  The first was on the right hip and was removed by his wife.  She has Lyme's and states they were all deer ticks.  He denies any fevers or chills.  No sore throat.  No joint pains other than his chronic neck pain.  Other 2 tick bites were on his left leg.    History of type 2 diabetes.           Allergies   Allergen Reactions    Septra [Sulfamethoxazole W-Trimethoprim]      Urinary symptoms    Sulfamethoxazole-Trimethoprim        Past Medical History:   Diagnosis Date    Kidney stone 11/10/2011    Diagnosed by ultrasound 10/24/11 3-4 mm     Lung nodule     MEDIAL MENISCUS TEAR - left 10/24/2011    Type 2 diabetes mellitus with hyperglycemia, without long-term current use of insulin (H) 5/25/2021       Current Outpatient Medications   Medication Sig Dispense Refill    Acetaminophen (TYLENOL PO)       Ascorbic Acid (VITAMIN C CR PO) Take  by mouth.      aspirin 81 MG  tablet Take 1 tablet by mouth 2 times daily       ciprofloxacin (CILOXAN) 0.3 % ophthalmic solution Place 1-2 drops into both eyes every 4 hours 3 mL 0    EPINEPHrine (ANY BX GENERIC EQUIV) 0.3 MG/0.3ML injection 2-pack Inject 0.3 mLs (0.3 mg) into the muscle as needed for anaphylaxis 2 each 3    fluticasone (FLONASE) 50 MCG/ACT nasal spray Spray 1 spray into both nostrils daily 16 g 0    IBUPROFEN PO       ipratropium (ATROVENT) 0.06 % nasal spray Spray 2 sprays into both nostrils 4 times daily as needed for rhinitis 15 mL 11    Multiple Minerals-Vitamins (CALCIUM-MAGNESIUM-ZINC-D3) TABS Take 1 tablet by mouth 2 times daily      Multiple Vitamin (MULTI-VITAMINS) TABS Take 1 tablet by mouth daily      mupirocin (BACTROBAN) 2 % external ointment Apply topically 3 times daily      omeprazole (PRILOSEC) 20 MG DR capsule Take 1 capsule (20 mg) by mouth daily 90 capsule 3    order for DME CPAP 9 cm H20 1 Units 0     No current facility-administered medications for this visit.       Social History     Tobacco Use    Smoking status: Former     Current packs/day: 0.00     Average packs/day: 1 pack/day for 35.4 years (35.4 ttl pk-yrs)     Types: Cigarettes, Cigars, Pipe     Start date: 1963     Quit date: 10/13/1998     Years since quittin.6     Passive exposure: Never    Smokeless tobacco: Never   Substance Use Topics    Alcohol use: Yes     Comment: about a beer a week     Drug use: No       ROS:  General: negative for fever  SKIN: + as above      Physcial Exam:  BP (!) 166/72 (BP Location: Left arm, Patient Position: Sitting, Cuff Size: Adult Large)   Pulse 87   Temp 97.6  F (36.4  C) (Tympanic)   Resp 16   Wt 82.5 kg (181 lb 12.8 oz)   SpO2 97%   BMI 26.09 kg/m      GENERAL: alert, no acute distress  EYES: conjunctival clear  RESP: Regular breathing rate  NEURO: awake .  SKIN: Right hip is with a definite bull's-eye rash, size of nickel.  Central excoriation.  Unable to find any excoriated or punctate  lesions left leg where he had the 2 other deer ticks.    Frannie Narvaez PA-C

## 2024-05-29 LAB — B BURGDOR IGG+IGM SER QL: 0.09

## 2024-06-21 ENCOUNTER — PATIENT OUTREACH (OUTPATIENT)
Dept: FAMILY MEDICINE | Facility: CLINIC | Age: 79
End: 2024-06-21
Payer: COMMERCIAL

## 2024-06-21 NOTE — TELEPHONE ENCOUNTER
Patient Quality Outreach    Patient is due for the following:   Diabetes -  A1C    Next Steps:   Patient has upcoming appointment, these items will be addressed at that time.    Type of outreach:    Chart review performed, no outreach needed.      Questions for provider review:    None           Tyson Miller, CMA

## 2024-07-15 ENCOUNTER — OFFICE VISIT (OUTPATIENT)
Dept: FAMILY MEDICINE | Facility: CLINIC | Age: 79
End: 2024-07-15
Payer: COMMERCIAL

## 2024-07-15 VITALS
HEIGHT: 70 IN | TEMPERATURE: 98.4 F | HEART RATE: 84 BPM | DIASTOLIC BLOOD PRESSURE: 72 MMHG | BODY MASS INDEX: 26.34 KG/M2 | WEIGHT: 184 LBS | OXYGEN SATURATION: 96 % | RESPIRATION RATE: 16 BRPM | SYSTOLIC BLOOD PRESSURE: 148 MMHG

## 2024-07-15 DIAGNOSIS — Z12.5 SCREENING FOR PROSTATE CANCER: ICD-10-CM

## 2024-07-15 DIAGNOSIS — D64.9 ANEMIA, UNSPECIFIED TYPE: ICD-10-CM

## 2024-07-15 DIAGNOSIS — D50.9 IRON DEFICIENCY ANEMIA, UNSPECIFIED IRON DEFICIENCY ANEMIA TYPE: ICD-10-CM

## 2024-07-15 DIAGNOSIS — A69.20 ERYTHEMA MIGRANS (LYME DISEASE): ICD-10-CM

## 2024-07-15 DIAGNOSIS — E11.65 TYPE 2 DIABETES MELLITUS WITH HYPERGLYCEMIA, WITHOUT LONG-TERM CURRENT USE OF INSULIN (H): Primary | ICD-10-CM

## 2024-07-15 DIAGNOSIS — R06.09 DOE (DYSPNEA ON EXERTION): ICD-10-CM

## 2024-07-15 DIAGNOSIS — R53.83 FATIGUE, UNSPECIFIED TYPE: ICD-10-CM

## 2024-07-15 DIAGNOSIS — I10 PRIMARY HYPERTENSION: ICD-10-CM

## 2024-07-15 DIAGNOSIS — R79.89 ABNORMAL TSH: ICD-10-CM

## 2024-07-15 LAB
ANION GAP SERPL CALCULATED.3IONS-SCNC: 13 MMOL/L (ref 7–15)
BASOPHILS # BLD AUTO: 0 10E3/UL (ref 0–0.2)
BASOPHILS NFR BLD AUTO: 1 %
BUN SERPL-MCNC: 8.6 MG/DL (ref 8–23)
CALCIUM SERPL-MCNC: 9.5 MG/DL (ref 8.8–10.2)
CHLORIDE SERPL-SCNC: 100 MMOL/L (ref 98–107)
CREAT SERPL-MCNC: 0.95 MG/DL (ref 0.67–1.17)
CREAT UR-MCNC: 240 MG/DL
EGFRCR SERPLBLD CKD-EPI 2021: 82 ML/MIN/1.73M2
EOSINOPHIL # BLD AUTO: 0.2 10E3/UL (ref 0–0.7)
EOSINOPHIL NFR BLD AUTO: 5 %
ERYTHROCYTE [DISTWIDTH] IN BLOOD BY AUTOMATED COUNT: 12.4 % (ref 10–15)
FERRITIN SERPL-MCNC: 184 NG/ML (ref 31–409)
GLUCOSE SERPL-MCNC: 272 MG/DL (ref 70–99)
HBA1C MFR BLD: 6.8 % (ref 0–5.6)
HCO3 SERPL-SCNC: 24 MMOL/L (ref 22–29)
HCT VFR BLD AUTO: 41.7 % (ref 40–53)
HGB BLD-MCNC: 14.3 G/DL (ref 13.3–17.7)
HOLD SPECIMEN: NORMAL
IMM GRANULOCYTES # BLD: 0 10E3/UL
IMM GRANULOCYTES NFR BLD: 0 %
LYMPHOCYTES # BLD AUTO: 1.4 10E3/UL (ref 0.8–5.3)
LYMPHOCYTES NFR BLD AUTO: 30 %
MCH RBC QN AUTO: 31.2 PG (ref 26.5–33)
MCHC RBC AUTO-ENTMCNC: 34.3 G/DL (ref 31.5–36.5)
MCV RBC AUTO: 91 FL (ref 78–100)
MICROALBUMIN UR-MCNC: 29.3 MG/L
MICROALBUMIN/CREAT UR: 12.21 MG/G CR (ref 0–17)
MONOCYTES # BLD AUTO: 0.2 10E3/UL (ref 0–1.3)
MONOCYTES NFR BLD AUTO: 5 %
NEUTROPHILS # BLD AUTO: 2.7 10E3/UL (ref 1.6–8.3)
NEUTROPHILS NFR BLD AUTO: 60 %
PLATELET # BLD AUTO: 110 10E3/UL (ref 150–450)
POTASSIUM SERPL-SCNC: 4.7 MMOL/L (ref 3.4–5.3)
PSA SERPL DL<=0.01 NG/ML-MCNC: 0.7 NG/ML (ref 0–6.5)
RBC # BLD AUTO: 4.58 10E6/UL (ref 4.4–5.9)
SODIUM SERPL-SCNC: 137 MMOL/L (ref 135–145)
T4 FREE SERPL-MCNC: 0.92 NG/DL (ref 0.9–1.7)
TSH SERPL DL<=0.005 MIU/L-ACNC: 5.22 UIU/ML (ref 0.3–4.2)
WBC # BLD AUTO: 4.4 10E3/UL (ref 4–11)

## 2024-07-15 PROCEDURE — 84439 ASSAY OF FREE THYROXINE: CPT | Performed by: STUDENT IN AN ORGANIZED HEALTH CARE EDUCATION/TRAINING PROGRAM

## 2024-07-15 PROCEDURE — 82043 UR ALBUMIN QUANTITATIVE: CPT | Performed by: STUDENT IN AN ORGANIZED HEALTH CARE EDUCATION/TRAINING PROGRAM

## 2024-07-15 PROCEDURE — 36415 COLL VENOUS BLD VENIPUNCTURE: CPT | Performed by: STUDENT IN AN ORGANIZED HEALTH CARE EDUCATION/TRAINING PROGRAM

## 2024-07-15 PROCEDURE — 83036 HEMOGLOBIN GLYCOSYLATED A1C: CPT | Performed by: STUDENT IN AN ORGANIZED HEALTH CARE EDUCATION/TRAINING PROGRAM

## 2024-07-15 PROCEDURE — 82570 ASSAY OF URINE CREATININE: CPT | Performed by: STUDENT IN AN ORGANIZED HEALTH CARE EDUCATION/TRAINING PROGRAM

## 2024-07-15 PROCEDURE — G0103 PSA SCREENING: HCPCS | Performed by: STUDENT IN AN ORGANIZED HEALTH CARE EDUCATION/TRAINING PROGRAM

## 2024-07-15 PROCEDURE — 86618 LYME DISEASE ANTIBODY: CPT | Performed by: STUDENT IN AN ORGANIZED HEALTH CARE EDUCATION/TRAINING PROGRAM

## 2024-07-15 PROCEDURE — 99214 OFFICE O/P EST MOD 30 MIN: CPT | Performed by: STUDENT IN AN ORGANIZED HEALTH CARE EDUCATION/TRAINING PROGRAM

## 2024-07-15 PROCEDURE — 84443 ASSAY THYROID STIM HORMONE: CPT | Performed by: STUDENT IN AN ORGANIZED HEALTH CARE EDUCATION/TRAINING PROGRAM

## 2024-07-15 PROCEDURE — 82728 ASSAY OF FERRITIN: CPT | Performed by: STUDENT IN AN ORGANIZED HEALTH CARE EDUCATION/TRAINING PROGRAM

## 2024-07-15 PROCEDURE — 85025 COMPLETE CBC W/AUTO DIFF WBC: CPT | Performed by: STUDENT IN AN ORGANIZED HEALTH CARE EDUCATION/TRAINING PROGRAM

## 2024-07-15 PROCEDURE — 80048 BASIC METABOLIC PNL TOTAL CA: CPT | Performed by: STUDENT IN AN ORGANIZED HEALTH CARE EDUCATION/TRAINING PROGRAM

## 2024-07-15 NOTE — PROGRESS NOTES
Assessment & Plan     Type 2 diabetes mellitus with hyperglycemia, without long-term current use of insulin (H)  Lab Results   Component Value Date    A1C 6.8 07/15/2024    A1C 7.6 12/19/2023    A1C 8.3 07/20/2023    A1C 8.9 04/18/2023    A1C 6.7 04/21/2022    A1C 6.7 04/16/2021    A1C 5.8 10/31/2019    A1C 5.4 11/07/2018    A1C 5.7 10/16/2015    A1C 5.3 09/24/2014     Diabetes continues to improve with lifestyle management. Not on medications. Now at goal. We will check microalbumin.  - BASIC METABOLIC PANEL; Future  - HEMOGLOBIN A1C; Future  - Albumin Random Urine Quantitative with Creat Ratio; Future  - HEMOGLOBIN A1C  - Albumin Random Urine Quantitative with Creat Ratio  - BASIC METABOLIC PANEL    Primary hypertension   He declines medication for BP. Discussed low salt diet    Screening for prostate cancer  - PROSTATE SPEC ANTIGEN SCREEN; Future  - PROSTATE SPEC ANTIGEN SCREEN    Iron deficiency anemia, unspecified iron deficiency anemia type  Anemia, unspecified type  Hemoglobin improved on repeat testing today. We will check iron levels. No change in fatigue with improving hemoglobin back to non-anemic state   - CBC with platelets and differential; Future  - Ferritin; Future  - Ferritin  - CBC with platelets and differential  - TSH with free T4 reflex; Future  - TSH with free T4 reflex    Erythema migrans (Lyme disease)  Was treated with 3 weeks of doxycycline from urgent care. No further symptoms. Recommend antibody testing for confirmation   - LYME DISEASE TOTAL ANTIBODIES WITH REFLEX TO CONFIRMATION; Future  - LYME DISEASE TOTAL ANTIBODIES WITH REFLEX TO CONFIRMATION    Abnormal TSH  Recommend rechecking, TSH has been increasing and T4 borderline low. They are endorsing chronic fatigue issue so advise rechecking level.   - TSH with free T4 reflex; Future  - TSH with free T4 reflex    Fatigue, unspecified type  HELM (dyspnea on exertion)  Unclear cause. Endorses fatigue and HELM . No response to treatment of  anemia. Borderline low thyroid function. Recommend ECHO to evaluate for signs of HF. Otherwise denies edema or orthopnea. He is using his CPAP but sometimes struggles with it coming off/disrupting sleep   - Echocardiogram Complete; Future        Subjective   Garrett is a 78 year old, presenting for the following health issues:  UC Follow-Up        7/15/2024     8:54 AM   Additional Questions   Accompanied by wife         7/15/2024     8:54 AM   Patient Reported Additional Medications   Patient reports taking the following new medications none     HPI         ED/UC Followup:    Facility:  [facility]  Date of visit: 05/28/2024  Reason for visit: 3 tick bites: right hip and 3 on left leg  Current Status: stable, improved     Never had arthralgias, fevers, chills. No other rashes. Had one small spot on right flank. Wife had removed it. Checking labs.     Treated with doxycyline 100 mg for 30 days    Anemia-  Been taking iron supplement since last visit. Forgot to mention that he did donate blood 3 days before having his labs drawn. Has been taking iron gentle daily. No constipation.     BP at home has been typically 130-140s. Walking with work on school bus. Stairs at home. Exhausted. Doesn't sleep well. CPAP works but too active in bed and then CPAP gets tangled. Has never been on medications for BP and prefers not to be. Sleeps a lot during the day. Naps a lot. Not a lot of energy. 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 Will take a nap after getting the route then does the route then takes a nap and then drives again. More fatigue with exertion and HELM.    Decreased appetite and decreased type of food. Doesn't get hungry much but snacks a lot, almonds, mainly eats soup. Always been more limited in types of food he's interested in: meat and potatoes, pasta, not many vegetables.         Lab Results   Component Value Date    A1C 6.8 07/15/2024    A1C 7.6 12/19/2023     "A1C 8.3 07/20/2023    A1C 8.9 04/18/2023    A1C 6.7 04/21/2022    A1C 6.7 04/16/2021    A1C 5.8 10/31/2019    A1C 5.4 11/07/2018    A1C 5.7 10/16/2015    A1C 5.3 09/24/2014                  Review of Systems  Constitutional, HEENT, cardiovascular, pulmonary, gi and gu systems are negative, except as otherwise noted.      Objective    BP (!) 148/72   Pulse 84   Temp 98.4  F (36.9  C) (Oral)   Resp 16   Ht 1.778 m (5' 10\")   Wt 83.5 kg (184 lb)   SpO2 96%   BMI 26.40 kg/m    Body mass index is 26.4 kg/m .  Physical Exam   GENERAL: alert and no distress  NECK: no adenopathy, no asymmetry, masses, or scars  RESP: lungs clear to auscultation - no rales, rhonchi or wheezes  CV: regular rate and rhythm, normal S1 S2, no S3 or S4, no murmur, click or rub, no peripheral edema  ABDOMEN: soft, nontender, no hepatosplenomegaly, no masses and bowel sounds normal  MS: no gross musculoskeletal defects noted, no edema    Results for orders placed or performed in visit on 07/15/24   HEMOGLOBIN A1C     Status: Abnormal   Result Value Ref Range    Hemoglobin A1C 6.8 (H) 0.0 - 5.6 %   Extra Tube     Status: None    Narrative    The following orders were created for panel order Extra Tube.  Procedure                               Abnormality         Status                     ---------                               -----------         ------                     Extra Blue Top Tube[[id number]]                                                         Extra Red Top Tube[[id number]]                               Final result               Extra Green Top (Lithium...[[id number]]                      Final result               Extra Purple Top Tube[[id number]]                            Final result                 Please view results for these tests on the individual orders.   Extra Red Top Tube     Status: None   Result Value Ref Range    Hold Specimen JIC    Extra Green Top (Lithium Heparin) Tube     Status: None   Result Value Ref Range "    Hold Specimen JIC    Extra Purple Top Tube     Status: None   Result Value Ref Range    Hold Specimen JIC    CBC with platelets and differential     Status: Abnormal   Result Value Ref Range    WBC Count 4.4 4.0 - 11.0 10e3/uL    RBC Count 4.58 4.40 - 5.90 10e6/uL    Hemoglobin 14.3 13.3 - 17.7 g/dL    Hematocrit 41.7 40.0 - 53.0 %    MCV 91 78 - 100 fL    MCH 31.2 26.5 - 33.0 pg    MCHC 34.3 31.5 - 36.5 g/dL    RDW 12.4 10.0 - 15.0 %    Platelet Count 110 (L) 150 - 450 10e3/uL    % Neutrophils 60 %    % Lymphocytes 30 %    % Monocytes 5 %    % Eosinophils 5 %    % Basophils 1 %    % Immature Granulocytes 0 %    Absolute Neutrophils 2.7 1.6 - 8.3 10e3/uL    Absolute Lymphocytes 1.4 0.8 - 5.3 10e3/uL    Absolute Monocytes 0.2 0.0 - 1.3 10e3/uL    Absolute Eosinophils 0.2 0.0 - 0.7 10e3/uL    Absolute Basophils 0.0 0.0 - 0.2 10e3/uL    Absolute Immature Granulocytes 0.0 <=0.4 10e3/uL   CBC with platelets and differential     Status: Abnormal    Narrative    The following orders were created for panel order CBC with platelets and differential.  Procedure                               Abnormality         Status                     ---------                               -----------         ------                     CBC with platelets and d...[[id number]]  Abnormal            Final result                 Please view results for these tests on the individual orders.           Signed Electronically by: Gabbi Gomez DO

## 2024-07-16 LAB — B BURGDOR IGG+IGM SER QL: 0.09

## 2024-07-21 ENCOUNTER — MYC REFILL (OUTPATIENT)
Dept: FAMILY MEDICINE | Facility: CLINIC | Age: 79
End: 2024-07-21
Payer: COMMERCIAL

## 2024-07-21 DIAGNOSIS — Z78.9 ALLERGY HISTORY UNKNOWN: ICD-10-CM

## 2024-07-22 ENCOUNTER — PATIENT OUTREACH (OUTPATIENT)
Dept: CARE COORDINATION | Facility: CLINIC | Age: 79
End: 2024-07-22
Payer: COMMERCIAL

## 2024-07-22 RX ORDER — EPINEPHRINE 0.3 MG/.3ML
0.3 INJECTION SUBCUTANEOUS PRN
Qty: 2 EACH | Refills: 3 | Status: SHIPPED | OUTPATIENT
Start: 2024-07-22

## 2024-08-02 ENCOUNTER — TRANSFERRED RECORDS (OUTPATIENT)
Dept: HEALTH INFORMATION MANAGEMENT | Facility: CLINIC | Age: 79
End: 2024-08-02
Payer: COMMERCIAL

## 2024-08-05 ENCOUNTER — PATIENT OUTREACH (OUTPATIENT)
Dept: CARE COORDINATION | Facility: CLINIC | Age: 79
End: 2024-08-05
Payer: COMMERCIAL

## 2024-08-13 ENCOUNTER — ANCILLARY PROCEDURE (OUTPATIENT)
Dept: CARDIOLOGY | Facility: CLINIC | Age: 79
End: 2024-08-13
Attending: STUDENT IN AN ORGANIZED HEALTH CARE EDUCATION/TRAINING PROGRAM
Payer: COMMERCIAL

## 2024-08-13 DIAGNOSIS — R06.09 DOE (DYSPNEA ON EXERTION): ICD-10-CM

## 2024-08-13 LAB — LVEF ECHO: NORMAL

## 2024-08-13 PROCEDURE — 93306 TTE W/DOPPLER COMPLETE: CPT | Performed by: STUDENT IN AN ORGANIZED HEALTH CARE EDUCATION/TRAINING PROGRAM

## 2024-08-22 DIAGNOSIS — R13.10 DYSPHAGIA, UNSPECIFIED TYPE: ICD-10-CM

## 2024-08-22 DIAGNOSIS — K21.9 GASTROESOPHAGEAL REFLUX DISEASE WITHOUT ESOPHAGITIS: ICD-10-CM

## 2024-09-19 ENCOUNTER — NURSE TRIAGE (OUTPATIENT)
Dept: NURSING | Facility: CLINIC | Age: 79
End: 2024-09-19
Payer: COMMERCIAL

## 2024-09-19 DIAGNOSIS — U07.1 INFECTION DUE TO 2019 NOVEL CORONAVIRUS: Primary | ICD-10-CM

## 2024-09-19 NOTE — TELEPHONE ENCOUNTER
COVID Positive/Requesting COVID treatment    Patient is positive for COVID and requesting treatment options.    Date of positive COVID test (PCR or at home)? 9/19/24  Current COVID symptoms: cough, headache, and congestion or runny nose  Date COVID symptoms began: 9/17/24    Message should be routed to clinic RN pool. Best phone number to use for call back: [phone]    Nurse Triage SBAR    Is this a 2nd Level Triage? NO    Situation: COVID +    Background: Patient tested positive to COVID today. Onset of symptoms on 9/17/24.     Assessment: Patient's wife (CTC located in chart) calling to report patient has symptoms including cough, nasal congestion, and headache. Oxygen 96% and temporal temperature of 99.6. Denies difficulty breathing, chest pain/pressure, confusion, blue lips, severe weakness, exposure to influenza, stiff neck, or suspected dehydration.    Recommendation: According to the protocol, patient should discuss covid treatments with provider.  Care advice given. Patient verbalizes understanding and agrees with plan of care. Routing note to PCP and Care Team.    Marissa Ha RN  09/19/24 6:29 PM  [facility] Nurse Advisor      Reason for Disposition   [1] HIGH RISK patient (e.g., weak immune system, age > 64 years, obesity with BMI 30 or higher, pregnant, chronic lung disease) AND [2] COVID symptoms (e.g., cough, fever)  (Exceptions: Already seen by PCP and no new or worsening symptoms.)    Additional Information   Negative: SEVERE difficulty breathing (e.g., struggling for each breath, speaks in single words)   Negative: Difficult to awaken or acting confused (e.g., disoriented, slurred speech)   Negative: Bluish (or gray) lips or face now   Negative: Shock suspected (e.g., cold/pale/clammy skin, too weak to stand, low BP, rapid pulse)   Negative: Sounds like a life-threatening emergency to the triager   Negative: [1] Diagnosed or suspected COVID-19 AND [2] symptoms lasting 3 or more  weeks   Negative: [1] COVID-19 exposure AND [2] no symptoms   Negative: COVID-19 vaccine reaction suspected (e.g., fever, headache, muscle aches) occurring 1 to 3 days after getting vaccine   Negative: COVID-19 vaccine, questions about   Negative: [1] Exposure to someone known to have influenza (flu test positive) AND [2] flu-like symptoms (e.g., cough, runny nose, sore throat, SOB; with or without fever)   Negative: [1] Possible COVID-19 symptoms AND [2] triager concerned about severity of symptoms or other causes   Negative: COVID-19 and breastfeeding, questions about   Negative: SEVERE or constant chest pain or pressure  (Exception: Mild central chest pain, present only when coughing.)   Negative: MODERATE difficulty breathing (e.g., speaks in phrases, SOB even at rest, pulse 100-120)   Negative: [1] Headache AND [2] stiff neck (can't touch chin to chest)   Negative: Oxygen level (e.g., pulse oximetry) 90% or lower   Negative: Chest pain or pressure  (Exception: MILD central chest pain, present only when coughing.)   Negative: [1] Drinking very little AND [2] dehydration suspected (e.g., no urine > 12 hours, very dry mouth, very lightheaded)   Negative: Patient sounds very sick or weak to the triager   Negative: MILD difficulty breathing (e.g., minimal/no SOB at rest, SOB with walking, pulse <100)   Negative: Fever > 103 F (39.4 C)   Negative: [1] Fever > 101 F (38.3 C) AND [2] age > 60 years   Negative: [1] Fever > 100 F (37.8 C) AND [2] bedridden (e.g., CVA, chronic illness, recovering from surgery)   Negative: Oxygen level (e.g., pulse oximetry) 91 to 94%    Protocols used: COVID-19 - Diagnosed or [id number]-E-PV

## 2024-09-20 NOTE — TELEPHONE ENCOUNTER
RN called and spoke with patients wife.    Patient currently not avaliable.    RN requested patient to call clinic back.    Patients wife verbalized understanding.    Aj Montana, RN, BSN, PHN  [facility]

## 2024-09-20 NOTE — TELEPHONE ENCOUNTER
Patient called back   Denies CP or SOB    RN COVID TREATMENT VISIT  09/20/24      The patient has been triaged and does not require a higher level of care.    Garrett Del Rio  78 year old  Current weight? 185 pounds    Has the patient been seen by a primary care provider at an [facility] or [facility] within the past two years? Yes.   Have you been in close proximity to/do you have a known exposure to a person with a confirmed case of influenza? No.     General treatment eligibility:  Date of positive COVID test (PCR or at home)?  9/19/24    Are you or have you been hospitalized for this COVID-19 infection? No.   Have you received monoclonal antibodies or antiviral treatment for COVID-19 since this positive test? No.   Do you have any of the following conditions that place you at risk of being very sick from COVID-19?   - Age 50 years or older  - Diabetes mellitus, type 1 and type 2  - Mental health disorders including mood disorders, depression, schizophrenia spectrum disorders   - Overweight or Obesity (BMI >85th percentile or BMI 25 or higher)  - Current or former smoker  Yes, patient has at least one high risk condition as noted above.     Current COVID symptoms:   - fever or chills  - cough  - headache  - congestion or runny nose  Yes. Patient has at least one symptom as selected.     How many days since symptoms started? 5 days or less. Established patient, 12 years or older weighing at least 88.2 lbs, who has symptoms that started in the past 5 days, has not been hospitalized nor received treatment already, and is at risk for being very sick from COVID-19.     Treatment eligibility by RN:  Are you currently pregnant or nursing? No  Do you have a clinically significant hypersensitivity to nirmatrelvir or ritonavir, or toxic epidermal necrolysis (TEN) or Thompson-Vidal Syndrome? No  Do you have a history of hepatitis, any hepatic impairment on the Problem List (such as Child-Neumann  Class C, cirrhosis, fatty liver disease, alcoholic liver disease), or was the last liver lab (hepatic panel, ALT, AST, ALK Phos, bilirubin) elevated in the past 6 months? No  Do you have any history of severe renal impairment (eGFR < 30mL/min)? No    Is patient eligible to continue? Yes, patient meets all eligibility requirements for the RN COVID treatment (as denoted by all no responses above).     Current Outpatient Medications   Medication Sig Dispense Refill    Ascorbic Acid (VITAMIN C CR PO) Take  by mouth.      aspirin 81 MG tablet Take 1 tablet by mouth 2 times daily       Multiple Minerals-Vitamins (CALCIUM-MAGNESIUM-ZINC-D3) TABS Take 1 tablet by mouth 2 times daily      Multiple Vitamin (MULTI-VITAMINS) TABS Take 1 tablet by mouth daily      omeprazole (PRILOSEC) 20 MG DR capsule TAKE ONE CAPSULE BY MOUTH ONE TIME DAILY 90 capsule 1    EPINEPHrine (ANY BX GENERIC EQUIV) 0.3 MG/0.3ML injection 2-pack Inject 0.3 mLs (0.3 mg) into the muscle as needed for anaphylaxis 2 each 3    fluticasone (FLONASE) 50 MCG/ACT nasal spray Spray 1 spray into both nostrils daily 16 g 0    IBUPROFEN PO       ipratropium (ATROVENT) 0.06 % nasal spray Spray 2 sprays into both nostrils 4 times daily as needed for rhinitis 15 mL 11    order for DME CPAP 9 cm H20 1 Units 0       Medications from List 1 of the standing order (on medications that exclude the use of Paxlovid) that patient is taking: NONE. Is patient taking Bettina's Wort? No  Is patient taking Bettina's Wort or any meds from List 1? No.   Medications from List 2 of the standing order (on meds that provider needs to adjust) that patient is taking: NONE. Is patient on any of the meds from List 2? No.   Medications from List 3 of standing order (on meds that a RN needs to adjust) that patient is taking: NONE. Is patient on any meds from List 3? No.     Paxlovid has an approximate 90% reduction in hospitalization. Paxlovid can possibly cause altered sense of taste,  diarrhea (loose, watery stools), high blood pressure, muscle aches.     Would patient like a Paxlovid prescription?   Yes.   Lab Results   Component Value Date    GFRESTIMATED 82 07/15/2024       Was last eGFR reduced? No, eGFR 60 or greater/ No Result on record. Patient can receive the normal renal function dose. Paxlovid Rx sent to [facility] pharmacy  Patient's [facility] pharmacy    Temporary change to home medications: None    All medication adjustments (holds, etc) were discussed with the patient and patient was asked to repeat back (teachback) their med adjustment.  Did patient understand med adjustment? No medication adjustments needed.         Reviewed the following instructions with the patient:    Paxlovid (nimatrelvir and ritonavir)    How it works  Two medicines (nirmatrelvir and ritonavir) are taken together. They stop the virus from growing. Less amount of virus is easier for your body to fight.    How to take  Medicine comes in a daily container with both medicine tablets. Take by mouth twice daily (once in the morning, once at night) for 5 days.  The number of tablets to take varies by patient.  Don't chew or break capsules. Swallow whole.    When to take  Take as soon as possible after positive COVID-19 test result, and within 5 days of your first symptoms.    Possible side effects  Can cause altered sense of taste, diarrhea (loose, watery stools), high blood pressure, muscle aches.    Cheikh Acuña RN

## 2024-10-25 ENCOUNTER — OFFICE VISIT (OUTPATIENT)
Dept: FAMILY MEDICINE | Facility: CLINIC | Age: 79
End: 2024-10-25
Payer: COMMERCIAL

## 2024-10-25 VITALS
TEMPERATURE: 97.9 F | OXYGEN SATURATION: 97 % | SYSTOLIC BLOOD PRESSURE: 138 MMHG | HEIGHT: 70 IN | DIASTOLIC BLOOD PRESSURE: 68 MMHG | HEART RATE: 76 BPM | BODY MASS INDEX: 25.98 KG/M2 | RESPIRATION RATE: 16 BRPM | WEIGHT: 181.5 LBS

## 2024-10-25 DIAGNOSIS — K22.4 DYSKINESIA OF ESOPHAGUS: Chronic | ICD-10-CM

## 2024-10-25 DIAGNOSIS — K21.9 GASTROESOPHAGEAL REFLUX DISEASE WITHOUT ESOPHAGITIS: ICD-10-CM

## 2024-10-25 DIAGNOSIS — E11.65 TYPE 2 DIABETES MELLITUS WITH HYPERGLYCEMIA, WITHOUT LONG-TERM CURRENT USE OF INSULIN (H): Primary | ICD-10-CM

## 2024-10-25 DIAGNOSIS — R41.3 SHORT-TERM MEMORY LOSS: ICD-10-CM

## 2024-10-25 DIAGNOSIS — F33.1 MODERATE EPISODE OF RECURRENT MAJOR DEPRESSIVE DISORDER (H): ICD-10-CM

## 2024-10-25 LAB
CHOLEST SERPL-MCNC: 142 MG/DL
EST. AVERAGE GLUCOSE BLD GHB EST-MCNC: 160 MG/DL
FASTING STATUS PATIENT QL REPORTED: NO
HBA1C MFR BLD: 7.2 % (ref 0–5.6)
HDLC SERPL-MCNC: 26 MG/DL
HOLD SPECIMEN: NORMAL
HOLD SPECIMEN: NORMAL
LDLC SERPL CALC-MCNC: ABNORMAL MG/DL
LDLC SERPL DIRECT ASSAY-MCNC: 63 MG/DL
NONHDLC SERPL-MCNC: 116 MG/DL
TRIGL SERPL-MCNC: 546 MG/DL

## 2024-10-25 PROCEDURE — 83036 HEMOGLOBIN GLYCOSYLATED A1C: CPT | Performed by: STUDENT IN AN ORGANIZED HEALTH CARE EDUCATION/TRAINING PROGRAM

## 2024-10-25 PROCEDURE — 90662 IIV NO PRSV INCREASED AG IM: CPT | Performed by: STUDENT IN AN ORGANIZED HEALTH CARE EDUCATION/TRAINING PROGRAM

## 2024-10-25 PROCEDURE — 80061 LIPID PANEL: CPT | Performed by: STUDENT IN AN ORGANIZED HEALTH CARE EDUCATION/TRAINING PROGRAM

## 2024-10-25 PROCEDURE — 99214 OFFICE O/P EST MOD 30 MIN: CPT | Mod: 25 | Performed by: STUDENT IN AN ORGANIZED HEALTH CARE EDUCATION/TRAINING PROGRAM

## 2024-10-25 PROCEDURE — G0008 ADMIN INFLUENZA VIRUS VAC: HCPCS | Performed by: STUDENT IN AN ORGANIZED HEALTH CARE EDUCATION/TRAINING PROGRAM

## 2024-10-25 PROCEDURE — 96127 BRIEF EMOTIONAL/BEHAV ASSMT: CPT | Performed by: STUDENT IN AN ORGANIZED HEALTH CARE EDUCATION/TRAINING PROGRAM

## 2024-10-25 PROCEDURE — 36415 COLL VENOUS BLD VENIPUNCTURE: CPT | Performed by: STUDENT IN AN ORGANIZED HEALTH CARE EDUCATION/TRAINING PROGRAM

## 2024-10-25 PROCEDURE — 83721 ASSAY OF BLOOD LIPOPROTEIN: CPT | Mod: 59 | Performed by: STUDENT IN AN ORGANIZED HEALTH CARE EDUCATION/TRAINING PROGRAM

## 2024-10-25 ASSESSMENT — PATIENT HEALTH QUESTIONNAIRE - PHQ9
SUM OF ALL RESPONSES TO PHQ QUESTIONS 1-9: 18
SUM OF ALL RESPONSES TO PHQ QUESTIONS 1-9: 18
10. IF YOU CHECKED OFF ANY PROBLEMS, HOW DIFFICULT HAVE THESE PROBLEMS MADE IT FOR YOU TO DO YOUR WORK, TAKE CARE OF THINGS AT HOME, OR GET ALONG WITH OTHER PEOPLE: NOT DIFFICULT AT ALL

## 2024-10-25 NOTE — PROGRESS NOTES
Assessment & Plan     Type 2 diabetes mellitus with hyperglycemia, without long-term current use of insulin (H)  Lab Results   Component Value Date    A1C 7.2 10/25/2024    A1C 6.8 07/15/2024    A1C 7.6 12/19/2023    A1C 8.3 07/20/2023    A1C 8.9 04/18/2023    A1C 6.7 04/16/2021    A1C 5.8 10/31/2019    A1C 5.4 11/07/2018    A1C 5.7 10/16/2015    A1C 5.3 09/24/2014     Stable, slight increase in A1c but fine without medicaitions.   - Lipid panel reflex to direct LDL Non-fasting; Future  - HEMOGLOBIN A1C; Future  - Lipid panel reflex to direct LDL Non-fasting  - HEMOGLOBIN A1C    Moderate episode of recurrent major depressive disorder (H)  He is having worsening depression with passive SI. No intent/plan. Frustration with the world. He does not want medication nor is he interested in therapy. We discussed a couple supplements that can help depression. He is using music as meditation. Discussed neurology's recommendation and pseudodementia     Short-term memory loss  He saw neurology for this.  His MoCA was normal.  They discussed rechecking thyroid which improved, making sure he is using his CPAP which he has been consistent with, and considering medication or treatment for depression which she declines.  We discussed some options for supplements for memory as well    Esophageal spasms  Gastroesophageal reflux disease without esophagitis  He has historically taken nitroglycerin from his previous PCP for this but this has not been refilled because of concern of whether he has any underlying cardiac disease as a cause, he declines any workup on his heart.  Currently on Prilosec 40 mg, we can consider decreasing to 20 mg after            Subjective   Garrett is a 78 year old, presenting for the following health issues:  Diabetes (/)        10/25/2024    10:01 AM   Patient Reported Additional Medications   Patient reports taking the following new medications Aspirin 325 MG two tabs daily     History of Present Illness  "      Diabetes:   He presents for follow up of diabetes.    He is not checking blood glucose.         He has no concerns regarding his diabetes at this time.   He is not experiencing numbness or burning in feet, excessive thirst, blurry vision, weight changes or redness, sores or blisters on feet.           Reason for visit:  Followup      Has a covid infection that he was relatively asymptomatic for 1 month ago.     Endorses feeling more depressed recently.   Denies SI but endorses feelings of \"I'd rather be dead\"   Feels that the world is grim. Difficult coping with today's world.  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 Doesn't need to work for the money but does it because he enjoys the kids.   No intent or plan for suicide.   He's not interested in therapy. Declines medications.     [facility]aw [facility] for short term memory concerns. No concerns. Advised repeat TSH and consideration for depression treatment.             8/20/2023    11:24 AM 12/15/2023     9:36 AM 10/25/2024     9:59 AM   PHQ   PHQ-9 Total Score 15 15 18    Q9: Thoughts of better off dead/self-harm past 2 weeks Not at all  Not at all  Nearly every day    F/U: Thoughts of suicide or self-harm   No    F/U: Safety concerns   No        Patient-reported         11/7/2018     1:10 PM 5/16/2019     1:59 PM 4/21/2022    12:27 PM   ODILIA-7 SCORE   Total Score   3 (minimal anxiety)   Total Score 1 1 3                 Lab Results   Component Value Date    A1C 7.2 10/25/2024    A1C 6.8 07/15/2024    A1C 7.6 12/19/2023    A1C 8.3 07/20/2023    A1C 8.9 04/18/2023    A1C 6.7 04/16/2021    A1C 5.8 10/31/2019    A1C 5.4 11/07/2018    A1C 5.7 10/16/2015    A1C 5.3 09/24/2014             Review of Systems  Constitutional, HEENT, cardiovascular, pulmonary, gi and gu systems are negative, except as otherwise noted.      Objective    BP " "138/68   Pulse 76   Temp 97.9  F (36.6  C) (Oral)   Resp 16   Ht 1.778 m (5' 10\")   Wt 82.3 kg (181 lb 8 oz)   SpO2 97%   BMI 26.04 kg/m    Body mass index is 26.04 kg/m .  Physical Exam   GENERAL: alert and no distress  EYES: Eyes grossly normal to inspection, PERRL and conjunctivae and sclerae normal  HENT: ear canals and TM's normal, nose and mouth without ulcers or lesions  NECK: no adenopathy, no asymmetry, masses, or scars  RESP: lungs clear to auscultation - no rales, rhonchi or wheezes  CV: regular rate and rhythm, normal S1 S2, no S3 or S4, no murmur, click or rub, no peripheral edema  ABDOMEN: soft, nontender, no hepatosplenomegaly, no masses and bowel sounds normal  MS: no gross musculoskeletal defects noted, no edema            Signed Electronically by: Gabbi Gomez DO    "

## 2024-10-25 NOTE — PROGRESS NOTES
Answers submitted by the patient for this visit:  Patient Health Questionnaire (Submitted on 10/25/2024)  If you checked off any problems, how difficult have these problems made it for you to do your work, take care of things at home, or get along with other people?: Not difficult at all  PHQ9 TOTAL SCORE: 18  Diabetes Visit (Submitted on 10/25/2024)  Chief Complaint: Chronic problems general questions HPI Form  Frequency of checking blood sugars:: not at all  Diabetic concerns:: none  Paraesthesia present:: none of these symptoms  General Questionnaire (Submitted on 10/25/2024)  Chief Complaint: Chronic problems general questions HPI Form  What is the reason for your visit today? : Followup  How many servings of fruits and vegetables do you eat daily?: 0-1  On average, how many sweetened beverages do you drink each day (Examples: soda, juice, sweet tea, etc.  Do NOT count diet or artificially sweetened beverages)?: 1  Questionnaire about: Chronic problems general questions HPI Form (Submitted on 10/25/2024)  Chief Complaint: Chronic problems general questions HPI Form    {PROVIDER CHARTING PREFERENCE:189892}    Sarwat Tucker is a 78 year old, presenting for the following health issues:  No chief complaint on file.          {SUPERLIST (Optional):303224}  {additonal problems for provider to add (Optional):000080}    {ROS Picklists (Optional):982847}      Objective    There were no vitals taken for this visit.  There is no height or weight on file to calculate BMI.  Physical Exam   {Exam List (Optional):937754}    {Diagnostic Test Results (Optional):268969}        Signed Electronically by: Gabbi Gomez DO  {Email feedback regarding this note to [email or web address]   :[id number]}

## 2024-10-25 NOTE — PATIENT INSTRUCTIONS
Supplements for depression: 5-HTP, L-theonine,   Supplements for memory: omega 3 fatty acid or B-complex vitamins  Let me know if interested in medications for your depression

## 2024-10-31 ENCOUNTER — TRANSFERRED RECORDS (OUTPATIENT)
Dept: HEALTH INFORMATION MANAGEMENT | Facility: CLINIC | Age: 79
End: 2024-10-31
Payer: COMMERCIAL

## 2024-11-16 ENCOUNTER — HEALTH MAINTENANCE LETTER (OUTPATIENT)
Age: 79
End: 2024-11-16

## 2024-12-08 ENCOUNTER — OFFICE VISIT (OUTPATIENT)
Dept: URGENT CARE | Facility: URGENT CARE | Age: 79
End: 2024-12-08
Payer: COMMERCIAL

## 2024-12-08 VITALS
DIASTOLIC BLOOD PRESSURE: 70 MMHG | OXYGEN SATURATION: 97 % | HEART RATE: 86 BPM | BODY MASS INDEX: 26.54 KG/M2 | SYSTOLIC BLOOD PRESSURE: 174 MMHG | TEMPERATURE: 98.3 F | RESPIRATION RATE: 28 BRPM | WEIGHT: 185 LBS

## 2024-12-08 DIAGNOSIS — J01.90 ACUTE NON-RECURRENT SINUSITIS, UNSPECIFIED LOCATION: Primary | ICD-10-CM

## 2024-12-08 DIAGNOSIS — R04.0 EPISTAXIS: ICD-10-CM

## 2024-12-08 DIAGNOSIS — J34.89 INTERNAL NASAL LESION: ICD-10-CM

## 2024-12-08 PROCEDURE — 99213 OFFICE O/P EST LOW 20 MIN: CPT | Performed by: PHYSICIAN ASSISTANT

## 2024-12-08 RX ORDER — ASPIRIN 325 MG
650 TABLET ORAL EVERY MORNING
COMMUNITY

## 2024-12-08 RX ORDER — MUPIROCIN 20 MG/G
OINTMENT TOPICAL 3 TIMES DAILY
Qty: 22 G | Refills: 0 | Status: SHIPPED | OUTPATIENT
Start: 2024-12-08 | End: 2024-12-15

## 2024-12-08 NOTE — PROGRESS NOTES
Assessment & Plan     Acute non-recurrent sinusitis, unspecified location  - amoxicillin-clavulanate (AUGMENTIN) 875-125 MG tablet; Take 1 tablet by mouth 2 times daily for 7 days.    Internal nasal lesion  - mupirocin (BACTROBAN) 2 % external ointment; Apply topically 3 times daily for 7 days.    Epistaxis    We discussed this is very likely a viral infection turned secondary bacterial sinusitis given symptoms worsening after 2 weeks.  Amoxicillin twice daily for 7 days.  Use mupirocin for lesions inside left nostril.  Follow-up to be seen if symptoms significantly worsen or fail to improve.    Return in about 1 week (around 12/15/2024) for visit with primary care provider if not improving.     Dior Jean PA-C  [facility]    Subjective   Garrett Del Rio is a 78 year old who presents for the following health issues     Patient presents with:  Urgent Care: Sinus infection sx's flare up for 2 weeks.       HPI    Garrett presents clinic today for evaluation of facial pain and pressure.  Symptoms have been worsening for the last 2 weeks.  He has had nasal congestion with quite a bit of mucus.  Left nostril bloody noses intermittently.  Blood-tinged mucus recently from nasal passages.  Cough feels like it is coming from a tickle in his throat, sometimes productive of blood-tinged mucus.    He also notes that he has had itchy hands for an hour or two following ibuprofen several times recently.    Review of Systems   ROS negative except as stated above.      Objective    BP (!) 174/70   Pulse 86   Temp 98.3  F (36.8  C) (Tympanic)   Resp 28   Wt 83.9 kg (185 lb)   SpO2 97%   BMI 26.54 kg/m    Physical Exam   GENERAL: alert and no distress  EYES: Eyes grossly normal to inspection, PERRL and conjunctivae and sclerae normal  HENT: ear canals and TM's normal, nose with nasal turbinates edematous bilaterally, small abrasion on left nasal septum and inside of nare, bleeding currently controlled  and mouth without ulcers or lesions  NECK: no adenopathy, no asymmetry, masses, or scars  RESP: lungs clear to auscultation - no rales, rhonchi or wheezes  CV: regular rate and rhythm, normal S1 S2, no S3 or S4, no murmur, click or rub, no peripheral edema    No results found for any visits on 12/08/24.

## 2024-12-08 NOTE — PATIENT INSTRUCTIONS
Augmentin (amoxicillin-clavulanate) 875mg twice daily for 7 days as directed.  Take Tylenol or an NSAID such as ibuprofen or naproxen as needed for pain.  May use netti pot with bottled or distilled water and saline packets to flush sinuses.  Afrin (oxymetazoline) nasal spray twice daily for 3 days. Stop after 3 days.  Mucinex (guiafenesin) thins mucus and may help it to loosen more quickly  Saline drops or nasal sprays may loosen mucus.  Sit in the bathroom with the door closed and hot shower running to loosen mucus.  Contact primary care clinic if you do not have any relief from your symptoms after 10 days.  Present to emergency room for significantly increasing pain, persistent high fever >102F, swelling/redness around your eyes, changes in your vision or ability to move your eyes, altered mental status or a severe headache.

## 2025-01-21 ASSESSMENT — PATIENT HEALTH QUESTIONNAIRE - PHQ9: SUM OF ALL RESPONSES TO PHQ QUESTIONS 1-9: 18

## 2025-01-22 ENCOUNTER — OFFICE VISIT (OUTPATIENT)
Dept: FAMILY MEDICINE | Facility: CLINIC | Age: 80
End: 2025-01-22
Payer: COMMERCIAL

## 2025-01-22 VITALS
SYSTOLIC BLOOD PRESSURE: 148 MMHG | RESPIRATION RATE: 18 BRPM | BODY MASS INDEX: 27.99 KG/M2 | DIASTOLIC BLOOD PRESSURE: 74 MMHG | HEART RATE: 83 BPM | WEIGHT: 189 LBS | OXYGEN SATURATION: 98 % | TEMPERATURE: 98 F | HEIGHT: 69 IN

## 2025-01-22 DIAGNOSIS — J34.89 NASAL SORE: ICD-10-CM

## 2025-01-22 DIAGNOSIS — E11.65 TYPE 2 DIABETES MELLITUS WITH HYPERGLYCEMIA, WITHOUT LONG-TERM CURRENT USE OF INSULIN (H): ICD-10-CM

## 2025-01-22 DIAGNOSIS — F33.1 MODERATE EPISODE OF RECURRENT MAJOR DEPRESSIVE DISORDER (H): ICD-10-CM

## 2025-01-22 DIAGNOSIS — Z00.00 ENCOUNTER FOR MEDICARE ANNUAL WELLNESS EXAM: Primary | ICD-10-CM

## 2025-01-22 DIAGNOSIS — G47.33 OSA (OBSTRUCTIVE SLEEP APNEA): Chronic | ICD-10-CM

## 2025-01-22 DIAGNOSIS — E78.1 HYPERTRIGLYCERIDEMIA: ICD-10-CM

## 2025-01-22 DIAGNOSIS — T39.315A IBUPROFEN ADVERSE REACTION, INITIAL ENCOUNTER: ICD-10-CM

## 2025-01-22 DIAGNOSIS — I10 PRIMARY HYPERTENSION: ICD-10-CM

## 2025-01-22 DIAGNOSIS — R79.89 ABNORMAL TSH: ICD-10-CM

## 2025-01-22 DIAGNOSIS — R09.82 POSTNASAL DRIP: ICD-10-CM

## 2025-01-22 LAB
EST. AVERAGE GLUCOSE BLD GHB EST-MCNC: 197 MG/DL
HBA1C MFR BLD: 8.5 % (ref 0–5.6)

## 2025-01-22 PROCEDURE — 36415 COLL VENOUS BLD VENIPUNCTURE: CPT | Performed by: STUDENT IN AN ORGANIZED HEALTH CARE EDUCATION/TRAINING PROGRAM

## 2025-01-22 PROCEDURE — 83036 HEMOGLOBIN GLYCOSYLATED A1C: CPT | Performed by: STUDENT IN AN ORGANIZED HEALTH CARE EDUCATION/TRAINING PROGRAM

## 2025-01-22 RX ORDER — CETIRIZINE HYDROCHLORIDE 10 MG/1
10 TABLET ORAL DAILY
Qty: 90 TABLET | Refills: 1 | Status: SHIPPED | OUTPATIENT
Start: 2025-01-22

## 2025-01-22 RX ORDER — FLUTICASONE PROPIONATE 50 MCG
2 SPRAY, SUSPENSION (ML) NASAL DAILY
Qty: 16 G | Refills: 1 | Status: SHIPPED | OUTPATIENT
Start: 2025-01-22

## 2025-01-22 SDOH — HEALTH STABILITY: PHYSICAL HEALTH: ON AVERAGE, HOW MANY DAYS PER WEEK DO YOU ENGAGE IN MODERATE TO STRENUOUS EXERCISE (LIKE A BRISK WALK)?: 0 DAYS

## 2025-01-22 ASSESSMENT — SOCIAL DETERMINANTS OF HEALTH (SDOH)
HOW OFTEN DO YOU GET TOGETHER WITH FRIENDS OR RELATIVES?: PATIENT DECLINED
HOW OFTEN DO YOU GET TOGETHER WITH FRIENDS OR RELATIVES?: PATIENT DECLINED

## 2025-01-22 ASSESSMENT — PATIENT HEALTH QUESTIONNAIRE - PHQ9
SUM OF ALL RESPONSES TO PHQ QUESTIONS 1-9: 18
10. IF YOU CHECKED OFF ANY PROBLEMS, HOW DIFFICULT HAVE THESE PROBLEMS MADE IT FOR YOU TO DO YOUR WORK, TAKE CARE OF THINGS AT HOME, OR GET ALONG WITH OTHER PEOPLE: NOT DIFFICULT AT ALL
SUM OF ALL RESPONSES TO PHQ QUESTIONS 1-9: 18

## 2025-01-22 ASSESSMENT — ENCOUNTER SYMPTOMS: ALLERGIC REACTION: 1

## 2025-01-22 NOTE — PROGRESS NOTES
Preventive Care Visit  [facility]  Gabbi Gomez DO, Family Medicine  Jan 22, 2025      Assessment & Plan     Encounter for Medicare annual wellness exam    Type 2 diabetes mellitus with hyperglycemia, without long-term current use of insulin (H)  Recommend returning for blood work in 2-3 months for 6 month follow up. Has been adequately controlled for age with goal of <8% with diet   - BASIC METABOLIC PANEL; Future  - HEMOGLOBIN A1C; Future  - BASIC METABOLIC PANEL  - HEMOGLOBIN A1C    Moderate episode of recurrent major depressive disorder (H)  Continues to have passive SI, denies any active intent/plan. Continues to decline medications or therapy options. Uses music as therapy and is Jainism.     Primary hypertension  Chronic, BP remains elevated. Monitors at home typically 130-140s though, range 120-160. He declines medications again today   - BASIC METABOLIC PANEL; Future  - TSH with free T4 reflex; Future  - BASIC METABOLIC PANEL  - TSH with free T4 reflex    Ibuprofen adverse reaction, initial encounter  Having itching of hands after ibuprofen, no rash, no respiratory symptoms. Discussed no blood test to confirm. Recommend avoiding NSAIDs and we will refer to allergy  - Adult Allergy/Asthma  Referral; Future    Nasal sore  Suspect this is due to irritation from his CPAP nasal  mask. Recommend using mupirocin for a week then switch to vaseline to allow for protection and healing     ADOLFO (obstructive sleep apnea)- moderate (AHI 21)  Continue CPAP use     Abnormal TSH  Recommend rechecking thyroid function   - TSH with free T4 reflex; Future  - TSH with free T4 reflex    Hypertriglyceridemia  Recheck triglycerides   - Lipid panel reflex to direct LDL Fasting; Future    Postnasal drip  Start flonase and zytect to see if this helps symptoms. No acute sinus infection. Chronic issue. Has seen ENT twice  - cetirizine (ZYRTEC) 10 MG tablet; Take 1 tablet (10 mg) by mouth  "daily.  - fluticasone (FLONASE) 50 MCG/ACT nasal spray; Spray 2 sprays into both nostrils daily.    Patient has been advised of split billing requirements and indicates understanding: Yes        BMI  Estimated body mass index is 27.68 kg/m  as calculated from the following:    Height as of this encounter: 1.76 m (5' 9.29\").    Weight as of this encounter: 85.7 kg (189 lb).   Weight management plan: Discussed healthy diet and exercise guidelines    Depression Screening Follow Up        1/22/2025    10:28 AM   PHQ   PHQ-9 Total Score 18    Q9: Thoughts of better off dead/self-harm past 2 weeks Nearly every day    F/U: Thoughts of suicide or self-harm No    F/U: Safety concerns No        Proxy-reported       Counseling  Appropriate preventive services were addressed with this patient via screening, questionnaire, or discussion as appropriate for fall prevention, nutrition, physical activity, Tobacco-use cessation, social engagement, weight loss and cognition.  Checklist reviewing preventive services available has been given to the patient.  Reviewed patient's diet, addressing concerns and/or questions.   The patient was instructed to see the dentist every 6 months.   Discussed possible causes of fatigue. Patient reported safety concerns were addressed today.The patient was provided with written information regarding signs of hearing loss.   The patient's PHQ-9 score is consistent with moderate depression. He was provided with information regarding depression.         Sarwat Tucker is a 79 year old, presenting for the following:  MOOD CHANGES and Allergic Reaction            History of Present Illness       Mental Health Follow-up:  Patient presents to follow-up on Depression.Patient's depression since last visit has been:  Worse  The patient is not having other symptoms associated with depression.      Any significant life events: No  Patient is not feeling anxious or having panic attacks.  Patient has no concerns " about alcohol or drug use.   He is taking medications regularly.    Allergy/intolerance-  To ibuprofen  Causes itchiness of the hands only. No rash.    Mainly happens at bedtime.  Doesn't always happens but most of the time does.      Endorses SI. Doesn't plan to act on this. Feels very depressed with everything going on in the world. Feels there's better things than this. Wishes wasn't alive at times. Is Baptism. Has continued to decline medications. Music is meditation    Sleeping well. Probably too much. Tired and ready to nap at times. Wakes at 3:30 for cats and breakfast then naps until 5:30 then work, then returns home and naps 11a-1p. Goes to sleep between 8-9 pm. Falls asleep well.     Still working     Sores in nose. Thinks from CPAP nasal tubing. Worse this winter. Humidifier on cpap doesn't work.      BP at home ranges 120-160. Most commonly in 130-140s.     Prefers to not be on medications.     Grandfather  of cerebral aneurysm           Lab Results   Component Value Date    A1C 7.2 10/25/2024    A1C 6.8 07/15/2024    A1C 7.6 2023    A1C 8.3 2023    A1C 8.9 2023    A1C 6.7 2021    A1C 5.8 10/31/2019    A1C 5.4 2018    A1C 5.7 10/16/2015    A1C 5.3 2014           10/25/2024     9:59 AM 2025    10:54 AM 2025    10:28 AM   PHQ   PHQ-9 Total Score 18  18  18    Q9: Thoughts of better off dead/self-harm past 2 weeks Nearly every day Nearly every day Nearly every day    F/U: Thoughts of suicide or self-harm No No No    F/U: Safety concerns No No No        Patient-reported    Proxy-reported               Health Care Directive  Patient has a Health Care Directive on file  Advance care planning document is on file and is current.      2025   General Health   How would you rate your overall physical health? Good   Feel stress (tense, anxious, or unable to sleep) Not at all         2025   Nutrition   Diet: Regular (no restrictions)         2025    Exercise   Days per week of moderate/strenous exercise 0 days   (!) EXERCISE CONCERN      1/22/2025   Social Factors   Frequency of gathering with friends or relatives Patient declined   Worry food won't last until get money to buy more No   Food not last or not have enough money for food? No   Do you have housing? (Housing is defined as stable permanent housing and does not include staying ouside in a car, in a tent, in an abandoned building, in an overnight shelter, or couch-surfing.) Yes   Are you worried about losing your housing? No   Lack of transportation? No   Unable to get utilities (heat,electricity)? No         1/22/2025   Fall Risk   Fallen 2 or more times in the past year? No    No   Trouble with walking or balance? No    No       Multiple values from one day are sorted in reverse-chronological order          1/22/2025   Activities of Daily Living- Home Safety   Needs help with the following daily activites None of the above   Safety concerns in the home Throw rugs in the hallway    No grab bars in the bathroom       Multiple values from one day are sorted in reverse-chronological order         1/22/2025   Dental   Dentist two times every year? (!) NO         1/22/2025   Hearing Screening   Hearing concerns? (!) I FEEL THAT PEOPLE ARE MUMBLING OR NOT SPEAKING CLEARLY.    (!) TROUBLE UNDESTANDING A SPEAKER IN A PUBLIC MEETING OR Religion SERVICE.    (!) TROUBLE UNDERSTANDING SOFT OR WHISPERED SPEECH.       Multiple values from one day are sorted in reverse-chronological order         1/22/2025   Driving Risk Screening   Patient/family members have concerns about driving No         1/22/2025   General Alertness/Fatigue Screening   Have you been more tired than usual lately? (!) YES         1/22/2025   Urinary Incontinence Screening   Bothered by leaking urine in past 6 months No         1/22/2025   TB Screening   Were you born outside of the [place]? No       Today's PHQ-9 Score:       1/22/2025    10:28 AM    PHQ-9 SCORE   PHQ-9 Total Score MyChart 18 (Moderately severe depression)   PHQ-9 Total Score 18        Proxy-reported         2025   Substance Use   Alcohol more than 3/day or more than 7/wk No   Do you have a current opioid prescription? No   How severe/bad is pain from 1 to 10? 0/10 (No Pain)   Do you use any other substances recreationally? (!) OTHER     Social History     Tobacco Use    Smoking status: Former     Current packs/day: 0.00     Average packs/day: 1 pack/day for 35.4 years (35.4 ttl pk-yrs)     Types: Cigarettes, Cigars, Pipe     Start date: 1963     Quit date: 10/13/1998     Years since quittin.2     Passive exposure: Never    Smokeless tobacco: Never   Vaping Use    Vaping status: Never Used   Substance Use Topics    Alcohol use: Yes     Comment: about a beer a week     Drug use: No       ASCVD Risk   The 10-year ASCVD risk score (Celia ZAMORA, et al., 2019) is: 66.6%    Values used to calculate the score:      Age: 79 years      Sex: Male      Is Non- : No      Diabetic: Yes      Tobacco smoker: No      Systolic Blood Pressure: 154 mmHg      Is BP treated: No      HDL Cholesterol: 26 mg/dL      Total Cholesterol: 142 mg/dL            Reviewed and updated as needed this visit by Provider                    Past Medical History:   Diagnosis Date    Kidney stone 11/10/2011    Diagnosed by ultrasound 10/24/11 3-4 mm     Lung nodule     MEDIAL MENISCUS TEAR - left 10/24/2011    Type 2 diabetes mellitus with hyperglycemia, without long-term current use of insulin (H) 2021     Past Surgical History:   Procedure Laterality Date    CARDIAC STRESS TST,COMPLETE       KNEE SCOPE,MED/LAT MENISECTOMY  11    left, with partial medial menisectomy ONLY    HERNIA REPAIR Right     [facility]: Nigel Poon D.O.     OB History   No obstetric history on file.     Lab work is in process  Labs reviewed in EPIC  BP Readings from Last 3 Encounters:    25 (!) 154/80   24 (!) 174/70   10/25/24 138/68    Wt Readings from Last 3 Encounters:   25 85.7 kg (189 lb)   24 83.9 kg (185 lb)   10/25/24 82.3 kg (181 lb 8 oz)                  Patient Active Problem List   Diagnosis    GERD (gastroesophageal reflux disease)    Esophageal spasms    CARDIOVASCULAR SCREENING; LDL GOAL LESS THAN 160    OA (OSTEOARTHRITIS) OF KNEE - left    Prostate cancer screening    ADOLFO (obstructive sleep apnea)- moderate (AHI 21)    Mantoux: positive    Hiatal hernia    Colon polyp    Diverticulosis of large intestine    Psychophysiologic insomnia    Nonalcoholic fatty liver disease    Type 2 diabetes mellitus with hyperglycemia, without long-term current use of insulin (H)    Mild major depression    Moderate episode of recurrent major depressive disorder (H)     Past Surgical History:   Procedure Laterality Date    CARDIAC STRESS TST,COMPLETE      HC KNEE SCOPE,MED/LAT MENISECTOMY  11    left, with partial medial menisectomy ONLY    HERNIA REPAIR Right     [facility]: Nigel Poon D.O.       Social History     Tobacco Use    Smoking status: Former     Current packs/day: 0.00     Average packs/day: 1 pack/day for 35.4 years (35.4 ttl pk-yrs)     Types: Cigarettes, Cigars, Pipe     Start date: 1963     Quit date: 10/13/1998     Years since quittin.2     Passive exposure: Never    Smokeless tobacco: Never   Substance Use Topics    Alcohol use: Yes     Comment: about a beer a week      Family History   Problem Relation Age of Onset    Prostate Cancer Father     C.A.D. Father     Diabetes Paternal Grandfather     Hypertension No family hx of     Cancer No family hx of          Current Outpatient Medications   Medication Sig Dispense Refill    Ascorbic Acid (VITAMIN C CR PO) Take  by mouth.      aspirin (ASA) 325 MG tablet Take 650 mg by mouth every morning.      aspirin 81 MG tablet Take 1 tablet by mouth 2 times daily  (Patient not taking: Reported  on 12/8/2024)      EPINEPHrine (ANY BX GENERIC EQUIV) 0.3 MG/0.3ML injection 2-pack Inject 0.3 mLs (0.3 mg) into the muscle as needed for anaphylaxis (Patient not taking: Reported on 12/8/2024) 2 each 3    IBUPROFEN PO  (Patient not taking: Reported on 12/8/2024)      Multiple Minerals-Vitamins (CALCIUM-MAGNESIUM-ZINC-D3) TABS Take 1 tablet by mouth 2 times daily      Multiple Vitamin (MULTI-VITAMINS) TABS Take 1 tablet by mouth daily      omeprazole (PRILOSEC) 20 MG DR capsule TAKE ONE CAPSULE BY MOUTH ONE TIME DAILY 90 capsule 1    order for DME CPAP 9 cm H20 1 Units 0     Allergies   Allergen Reactions    Septra [Sulfamethoxazole W-Trimethoprim]      Urinary symptoms, maybe not from bactrim    Ibuprofen Itching     Itchy hands, no rash     Current providers sharing in care for this patient include:  Patient Care Team:  Gabbi Gomez DO as PCP - General (Family Medicine)  Charlie Wooten MD as MD (Orthopedics)  Addy Juan MD as MD (Urology)  Gabbi Gomez DO as Assigned PCP  Kashif Perry MD as MD (Otolaryngology)  Kashif Perry MD as Assigned Surgical Provider    The following health maintenance items are reviewed in Epic and correct as of today:  Health Maintenance   Topic Date Due    DIABETIC FOOT EXAM  04/21/2023    ANNUAL REVIEW OF HM ORDERS  07/20/2024    BMP  01/15/2025    A1C  01/25/2025    EYE EXAM  07/10/2025    MICROALBUMIN  07/15/2025    PSA  07/15/2025    PHQ-9  07/22/2025    LIPID  10/25/2025    MEDICARE ANNUAL WELLNESS VISIT  01/22/2026    FALL RISK ASSESSMENT  01/22/2026    ADVANCE CARE PLANNING  08/21/2028    DTAP/TDAP/TD IMMUNIZATION (5 - Td or Tdap) 04/19/2033    HEPATITIS C SCREENING  Completed    INFLUENZA VACCINE  Completed    Pneumococcal Vaccine: 50+ Years  Completed    ZOSTER IMMUNIZATION  Completed    RSV VACCINE  Completed    COVID-19 Vaccine  Completed    DEPRESSION ACTION PLAN  Addressed    HPV IMMUNIZATION  Aged Out     "MENINGITIS IMMUNIZATION  Aged Out    RSV MONOCLONAL ANTIBODY  Aged Out    COLORECTAL CANCER SCREENING  Discontinued    LUNG CANCER SCREENING  Discontinued         Review of Systems  Constitutional, HEENT, cardiovascular, pulmonary, gi and gu systems are negative, except as otherwise noted.     Objective    Exam  BP (!) 154/80 (BP Location: Right arm, Cuff Size: Adult Regular)   Pulse 83   Temp 98  F (36.7  C) (Oral)   Resp 18   Ht 1.76 m (5' 9.29\")   Wt 85.7 kg (189 lb)   SpO2 98%   BMI 27.68 kg/m     Estimated body mass index is 27.68 kg/m  as calculated from the following:    Height as of this encounter: 1.76 m (5' 9.29\").    Weight as of this encounter: 85.7 kg (189 lb).    Physical Exam  GENERAL: alert and no distress  EYES: Eyes grossly normal to inspection, PERRL and conjunctivae and sclerae normal  HENT: ear canals and TM's normal, nose and mouth without ulcers or lesions  NECK: no adenopathy, no asymmetry, masses, or scars  RESP: lungs clear to auscultation - no rales, rhonchi or wheezes  CV: regular rate and rhythm, normal S1 S2, no S3 or S4, no murmur, click or rub, no peripheral edema  ABDOMEN: soft, nontender, no hepatosplenomegaly, no masses and bowel sounds normal  MS: no gross musculoskeletal defects noted, no edema  SKIN: no suspicious lesions or rashes  NEURO: Normal strength and tone, mentation intact and speech normal  PSYCH: mentation appears normal, affect normal/bright         No data to display                       Signed Electronically by: Gabbi Gomez,     "

## 2025-01-22 NOTE — PROGRESS NOTES
"    {PROVIDER CHARTING PREFERENCE:312006}    Subjective   Garrett is a 79 year old, presenting for the following health issues:  No chief complaint on file.  {(!) Visit Details have not yet been documented.  Please enter Visit Details and then use this list to pull in documentation. (Optional):450193}    History of Present Illness       Mental Health Follow-up:  Patient presents to follow-up on Depression.Patient's depression since last visit has been:  Worse  The patient is not having other symptoms associated with depression.      Any significant life events: No  Patient is not feeling anxious or having panic attacks.  Patient has no concerns about alcohol or drug use.   He is taking medications regularly.     {ALERT  Recent PHQ-9/EPDS score indicates suicidal ideations, document follow-up within the A/P section of the note :690663}{Provider Documentation  Link to C-SSRS ([facility]) Flowsheet :190323}      Allergy/intolerance-  To ibuprofen  Causes itchiness of the hands only.   Mainly happens at bedtime.  Doesn't  always happens.              {MA/LPN/RN Pre-Provider Visit Orders- hCG/UA/Strep (Optional):862669}  {SUPERLIST (Optional):171169}  {additonal problems for provider to add (Optional):240499}    {ROS Picklists (Optional):767192}      Objective    Temp 98  F (36.7  C) (Oral)   Ht 1.76 m (5' 9.29\")   Wt 85.7 kg (189 lb)   BMI 27.68 kg/m    Body mass index is 27.68 kg/m .  Physical Exam   {Exam List (Optional):850247}    {Diagnostic Test Results (Optional):743251}        Signed Electronically by: Gabbi Gomez DO  {Email feedback regarding this note to [email or web address]   :831950}  "

## 2025-01-22 NOTE — PATIENT INSTRUCTIONS
Recommend getting your labs checked in 3 months when fasting, schedule a lab appointment for that time.     Allergist will give you a call to schedule. Avoid ibuprofen until then.     Start taking flonase and zyrtec daily to help you sinuses    Preventive Care Advice   This is general advice given by our system to help you stay healthy. However, your care team may have specific advice just for you. Please talk to your care team about your preventive care needs.  Nutrition  Eat 5 or more servings of fruits and vegetables each day.  Try wheat bread, brown rice and whole grain pasta (instead of white bread, rice, and pasta).  Get enough calcium and vitamin D. Check the label on foods and aim for 100% of the RDA (recommended daily allowance).  Lifestyle  Exercise at least 150 minutes each week  (30 minutes a day, 5 days a week).  Do muscle strengthening activities 2 days a week. These help control your weight and prevent disease.  No smoking.  Wear sunscreen to prevent skin cancer.  Have a dental exam and cleaning every 6 months.  Yearly exams  See your health care team every year to talk about:  Any changes in your health.  Any medicines your care team has prescribed.  Preventive care, family planning, and ways to prevent chronic diseases.  Shots (vaccines)   HPV shots (up to age 26), if you've never had them before.  Hepatitis B shots (up to age 59), if you've never had them before.  COVID-19 shot: Get this shot when it's due.  Flu shot: Get a flu shot every year.  Tetanus shot: Get a tetanus shot every 10 years.  Pneumococcal, hepatitis A, and RSV shots: Ask your care team if you need these based on your risk.  Shingles shot (for age 50 and up)  General health tests  Diabetes screening:  Starting at age 35, Get screened for diabetes at least every 3 years.  If you are younger than age 35, ask your care team if you should be screened for diabetes.  Cholesterol test: At age 39, start having a cholesterol test every 5  years, or more often if advised.  Bone density scan (DEXA): At age 50, ask your care team if you should have this scan for osteoporosis (brittle bones).  Hepatitis C: Get tested at least once in your life.  STIs (sexually transmitted infections)  Before age 24: Ask your care team if you should be screened for STIs.  After age 24: Get screened for STIs if you're at risk. You are at risk for STIs (including HIV) if:  You are sexually active with more than one person.  You don't use condoms every time.  You or a partner was diagnosed with a sexually transmitted infection.  If you are at risk for HIV, ask about PrEP medicine to prevent HIV.  Get tested for HIV at least once in your life, whether you are at risk for HIV or not.  Cancer screening tests  Cervical cancer screening: If you have a cervix, begin getting regular cervical cancer screening tests starting at age 21.  Breast cancer scan (mammogram): If you've ever had breasts, begin having regular mammograms starting at age 40. This is a scan to check for breast cancer.  Colon cancer screening: It is important to start screening for colon cancer at age 45.  Have a colonoscopy test every 10 years (or more often if you're at risk) Or, ask your provider about stool tests like a FIT test every year or Cologuard test every 3 years.  To learn more about your testing options, visit:   .  For help making a decision, visit:   https://bit.ly/wp71211.  Prostate cancer screening test: If you have a prostate, ask your care team if a prostate cancer screening test (PSA) at age 55 is right for you.  Lung cancer screening: If you are a current or former smoker ages 50 to 80, ask your care team if ongoing lung cancer screenings are right for you.  For informational purposes only. Not to replace the advice of your health care provider. Copyright   2023 [employer]. All rights reserved. Clinically reviewed by the [facility]. Door 6 [id number] -  REV 01/24.  Learning About Activities of Daily Living  What are activities of daily living?     Activities of daily living (ADLs) are the basic self-care tasks you do every day. These include eating, bathing, dressing, and moving around.  As you age, and if you have health problems, you may find that it's harder to do some of these tasks. If so, your doctor can suggest ideas that may help.  To measure what kind of help you may need, your doctor will ask how well you are able to do ADLs. Let your doctor know if there are any tasks that you are having trouble doing. This is an important first step to getting help. And when you have the help you need, you can stay as independent as possible.  How will a doctor assess your ADLs?  Asking about ADLs is part of a routine health checkup your doctor will likely do as you age. Your health check might be done in a doctor's office, in your home, or at a hospital. The goal is to find out if you are having any problems that could make it hard to care for yourself or that make it unsafe for you to be on your own.  To measure your ADLs, your doctor will ask how hard it is for you to do routine tasks. Your doctor may also want to know if you have changed the way you do a task because of a health problem. Your doctor may watch how you:  Walk back and forth.  Keep your balance while you stand or walk.  Move from sitting to standing or from a bed to a chair.  Button or unbutton a shirt or sweater.  Remove and put on your shoes.  It's common to feel a little worried or anxious if you find you can't do all the things you used to be able to do. Talking with your doctor about ADLs is a way to make sure you're as safe as possible and able to care for yourself as well as you can. You may want to bring a caregiver, friend, or family member to your checkup. They can help you talk to your doctor.  Follow-up care is a key part of your treatment and safety. Be sure to make and go to all  appointments, and call your doctor if you are having problems. It's also a good idea to know your test results and keep a list of the medicines you take.  Current as of: October 24, 2023  Content Version: 14.3    2024 [employer].   Care instructions adapted under license by your healthcare professional. If you have questions about a medical condition or this instruction, always ask your healthcare professional. [employer] disclaims any warranty or liability for your use of this information.    Hearing Loss: Care Instructions  Overview     Hearing loss is a sudden or slow decrease in how well you hear. It can range from slight to profound. Permanent hearing loss can occur with aging. It also can happen when you are exposed long-term to loud noise. Examples include listening to loud music, riding motorcycles, or being around other loud machines.  Hearing loss can affect your work and home life. It can make you feel lonely or depressed. You may feel that you have lost your independence. But hearing aids and other devices can help you hear better and feel connected to others.  Follow-up care is a key part of your treatment and safety. Be sure to make and go to all appointments, and call your doctor if you are having problems. It's also a good idea to know your test results and keep a list of the medicines you take.  How can you care for yourself at home?  Avoid loud noises whenever possible. This helps keep your hearing from getting worse.  Always wear hearing protection around loud noises.  Wear a hearing aid as directed.  A professional can help you pick a hearing aid that will work best for you.  You can also get hearing aids over the counter for mild to moderate hearing loss.  Have hearing tests as your doctor suggests. They can show whether your hearing has changed. Your hearing aid may need to be adjusted.  Use other devices as needed. These may include:  Telephone amplifiers and hearing  "aids that can connect to a television, stereo, radio, or microphone.  Devices that use lights or vibrations. These alert you to the doorbell, a ringing telephone, or a baby monitor.  Television closed-captioning. This shows the words at the bottom of the screen. Most new TVs can do this.  TTY (text telephone). This lets you type messages back and forth on the telephone instead of talking or listening. These devices are also called TDD. When messages are typed on the keyboard, they are sent over the phone line to a receiving TTY. The message is shown on a monitor.  Use text messaging, social media, and email if it is hard for you to communicate by telephone.  Try to learn a listening technique called speechreading. It is not lipreading. You pay attention to people's gestures, expressions, posture, and tone of voice. These clues can help you understand what a person is saying. Face the person you are talking to, and have them face you. Make sure the lighting is good. You need to see the other person's face clearly.  Think about counseling if you need help to adjust to your hearing loss.  When should you call for help?  Watch closely for changes in your health, and be sure to contact your doctor if:    You think your hearing is getting worse.     You have new symptoms, such as dizziness or nausea.   Where can you learn more?  Go to https://www.[employer].net/patiented  Enter R798 in the search box to learn more about \"Hearing Loss: Care Instructions.\"  Current as of: September 27, 2023  Content Version: 14.3    2024 [employer].   Care instructions adapted under license by your healthcare professional. If you have questions about a medical condition or this instruction, always ask your healthcare professional. [employer] disclaims any warranty or liability for your use of this information.    Learning About Sleeping Well  What does sleeping well mean?     Sleeping well means getting enough sleep " to feel good and stay healthy. How much sleep is enough varies among people.  The number of hours you sleep and how you feel when you wake up are both important. If you do not feel refreshed, you probably need more sleep. Another sign of not getting enough sleep is feeling tired during the day.  Experts recommend that adults get at least 7 or more hours of sleep per day. Children and older adults need more sleep.  Why is getting enough sleep important?  Getting enough quality sleep is a basic part of good health. When your sleep suffers, your physical health, mood, and your thoughts can suffer too. You may find yourself feeling more grumpy or stressed. Not getting enough sleep also can lead to serious problems, including injury, accidents, anxiety, and depression.  What might cause poor sleeping?  Many things can cause sleep problems, including:  Changes to your sleep schedule.  Stress. Stress can be caused by fear about a single event, such as giving a speech. Or you may have ongoing stress, such as worry about work or school.  Depression, anxiety, and other mental or emotional conditions.  Changes in your sleep habits or surroundings. This includes changes that happen where you sleep, such as noise, light, or sleeping in a different bed. It also includes changes in your sleep pattern, such as having jet lag or working a late shift.  Health problems, such as pain, breathing problems, and restless legs syndrome.  Lack of regular exercise.  Using alcohol, nicotine, or caffeine before bed.  How can you help yourself?  Here are some tips that may help you sleep more soundly and wake up feeling more refreshed.  Your sleeping area   Use your bedroom only for sleeping and sex. A bit of light reading may help you fall asleep. But if it doesn't, do your reading elsewhere in the house. Try not to use your TV, computer, smartphone, or tablet while you are in bed.  Be sure your bed is big enough to stretch out comfortably,  "especially if you have a sleep partner.  Keep your bedroom quiet, dark, and cool. Use curtains, blinds, or a sleep mask to block out light. To block out noise, use earplugs, soothing music, or a \"white noise\" machine.  Your evening and bedtime routine   Create a relaxing bedtime routine. You might want to take a warm shower or bath, or listen to soothing music.  Go to bed at the same time every night. And get up at the same time every morning, even if you feel tired.  What to avoid   Limit caffeine (coffee, tea, caffeinated sodas) during the day, and don't have any for at least 6 hours before bedtime.  Avoid drinking alcohol before bedtime. Alcohol can cause you to wake up more often during the night.  Try not to smoke or use tobacco, especially in the evening. Nicotine can keep you awake.  Limit naps during the day, especially close to bedtime.  Avoid lying in bed awake for too long. If you can't fall asleep or if you wake up in the middle of the night and can't get back to sleep within about 20 minutes, get out of bed and go to another room until you feel sleepy.  Avoid taking medicine right before bed that may keep you awake or make you feel hyper or energized. Your doctor can tell you if your medicine may do this and if you can take it earlier in the day.  If you can't sleep   Imagine yourself in a peaceful, pleasant scene. Focus on the details and feelings of being in a place that is relaxing.  Get up and do a quiet or boring activity until you feel sleepy.  Avoid drinking any liquids before going to bed to help prevent waking up often to use the bathroom.  Where can you learn more?  Go to https://www.wywy.net/patiented  Enter J942 in the search box to learn more about \"Learning About Sleeping Well.\"  Current as of: July 31, 2024  Content Version: 14.3    2024 [employer].   Care instructions adapted under license by your healthcare professional. If you have questions about a medical condition " or this instruction, always ask your healthcare professional. [employer] disclaims any warranty or liability for your use of this information.    Learning About Depression Screening  What is depression screening?  Depression screening is a way to see if you have depression symptoms. It may be done by a doctor or counselor. It's often part of a routine checkup. That's because your mental health is just as important as your physical health.  Depression is a mental health condition that affects how you feel, think, and act. You may:  Have less energy.  Lose interest in your daily activities.  Feel sad and grouchy for a long time.  Depression is very common. It affects people of all ages.  Many things can lead to depression. Some people become depressed after they have a stroke or find out they have a major illness like cancer or heart disease. The death of a loved one or a breakup may lead to depression. It can run in families. Most experts believe that a combination of inherited genes and stressful life events can cause it.  What happens during screening?  You may be asked to fill out a form about your depression symptoms. You and the doctor will discuss your answers. The doctor may ask you more questions to learn more about how you think, act, and feel.  What happens after screening?  If you have symptoms of depression, your doctor will talk to you about your options.  Doctors usually treat depression with medicines or counseling. Often, combining the two works best. Many people don't get help because they think that they'll get over the depression on their own. But people with depression may not get better unless they get treatment.  The cause of depression is not well understood. There may be many factors involved. But if you have depression, it's not your fault.  A serious symptom of depression is thinking about death or suicide. If you or someone you care about talks about this or about feeling  "hopeless, get help right away.  It's important to know that depression can be treated. Medicine, counseling, and self-care may help.  Where can you learn more?  Go to https://www.[employer].net/patiented  Enter T185 in the search box to learn more about \"Learning About Depression Screening.\"  Current as of: July 31, 2024  Content Version: 14.3    2024 [employer].   Care instructions adapted under license by your healthcare professional. If you have questions about a medical condition or this instruction, always ask your healthcare professional. [employer] disclaims any warranty or liability for your use of this information.    Substance Use Disorder: Care Instructions  Overview     You can improve your life and health by stopping your use of alcohol or drugs. When you don't drink or use drugs, you may feel and sleep better. You may get along better with your family, friends, and coworkers. There are medicines and programs that can help with substance use disorder.  How can you care for yourself at home?  Here are some ways to help you stay sober and prevent relapse.  If you have been given medicine to help keep you sober or reduce your cravings, be sure to take it exactly as prescribed.  Talk to your doctor about programs that can help you stop using drugs or drinking alcohol.  Do not keep alcohol or drugs in your home.  Plan ahead. Think about what you'll say if other people ask you to drink or use drugs. Try not to spend time with people who drink or use drugs.  Use the time and money spent on drinking or drugs to do something that's important to you.  Preventing a relapse  Have a plan to deal with relapse. Learn to recognize changes in your thinking that lead you to drink or use drugs. Get help before you start to drink or use drugs again.  Try to stay away from situations, friends, or places that may lead you to drink or use drugs.  If you feel the need to drink alcohol or use drugs " again, seek help right away. Call a trusted friend or family member. Some people get support from organizations such as Narcotics Anonymous or [employer] or from treatment facilities.  If you relapse, get help as soon as you can. Some people make a plan with another person that outlines what they want that person to do for them if they relapse. The plan usually includes how to handle the relapse and who to notify in case of relapse.  Don't give up. Remember that a relapse doesn't mean that you have failed. Use the experience to learn the triggers that lead you to drink or use drugs. Then quit again. Recovery is a lifelong process. Many people have several relapses before they are able to quit for good.  Follow-up care is a key part of your treatment and safety. Be sure to make and go to all appointments, and call your doctor if you are having problems. It's also a good idea to know your test results and keep a list of the medicines you take.  When should you call for help?   Call 911  anytime you think you may need emergency care. For example, call if you or someone else:    Has overdosed or has withdrawal signs. Be sure to tell the emergency workers that you are or someone else is using or trying to quit using drugs. Overdose or withdrawal signs may include:  Losing consciousness.  Seizure.  Seeing or hearing things that aren't there (hallucinations).     Is thinking or talking about suicide or harming others.   Where to get help 24 hours a day, 7 days a week   If you or someone you know talks about suicide, self-harm, a mental health crisis, a substance use crisis, or any other kind of emotional distress, get help right away. You can:    Call the Suicide and Crisis Lifeline at 864.     Call 3-364-144-TALK (1-912.887.7897).     Text HOME to 999130 to access the Crisis Text Line.   Consider saving these numbers in your phone.  Go to [employer] for more information or to chat online.  Call your doctor now or  "seek immediate medical care if:    You are having withdrawal symptoms. These may include nausea or vomiting, sweating, shakiness, and anxiety.   Watch closely for changes in your health, and be sure to contact your doctor if:    You have a relapse.     You need more help or support to stop.   Where can you learn more?  Go to https://www.[employer].net/patiented  Enter H573 in the search box to learn more about \"Substance Use Disorder: Care Instructions.\"  Current as of: November 15, 2023  Content Version: 14.3    2024 [employer].   Care instructions adapted under license by your healthcare professional. If you have questions about a medical condition or this instruction, always ask your healthcare professional. [employer] disclaims any warranty or liability for your use of this information.       "

## 2025-01-23 ENCOUNTER — TELEPHONE (OUTPATIENT)
Dept: ALLERGY | Facility: CLINIC | Age: 80
End: 2025-01-23
Payer: COMMERCIAL

## 2025-01-23 LAB
ANION GAP SERPL CALCULATED.3IONS-SCNC: 13 MMOL/L (ref 7–15)
BUN SERPL-MCNC: 11.4 MG/DL (ref 8–23)
CALCIUM SERPL-MCNC: 9.8 MG/DL (ref 8.8–10.4)
CHLORIDE SERPL-SCNC: 99 MMOL/L (ref 98–107)
CREAT SERPL-MCNC: 0.87 MG/DL (ref 0.67–1.17)
EGFRCR SERPLBLD CKD-EPI 2021: 88 ML/MIN/1.73M2
GLUCOSE SERPL-MCNC: 181 MG/DL (ref 70–99)
HCO3 SERPL-SCNC: 25 MMOL/L (ref 22–29)
POTASSIUM SERPL-SCNC: 4.7 MMOL/L (ref 3.4–5.3)
SODIUM SERPL-SCNC: 137 MMOL/L (ref 135–145)
T4 FREE SERPL-MCNC: 0.89 NG/DL (ref 0.9–1.7)
TSH SERPL DL<=0.005 MIU/L-ACNC: 5.6 UIU/ML (ref 0.3–4.2)

## 2025-01-23 NOTE — TELEPHONE ENCOUNTER
[facility] Call [facility]    Phone Message    May a detailed message be left on voicemail: yes     Reason for Call: Appointment Intake    Referring Provider Name: Gabbi Gomez DO  Diagnosis and/or Symptoms: Ibuprofen adverse reaction, initial encounter [T33.566A]      Please call Pt back to discuss if Dr. Melara can test for this drug allergy.     Thank you.    Action Taken: Other: [facility]    Travel Screening: Not Applicable     Date of Service:

## 2025-01-23 NOTE — TELEPHONE ENCOUNTER
There is no testing available for ibuprofen/NSAID allergy. He can be seen to discuss his symptoms/reaction but advise that testing would not be done at this visit.

## 2025-01-27 NOTE — TELEPHONE ENCOUNTER
Per notes attached to referral- Due to testing not available patient will not be scheduling.    Closing encounter    Jasmina HANKINS MA

## 2025-02-24 DIAGNOSIS — K21.9 GASTROESOPHAGEAL REFLUX DISEASE WITHOUT ESOPHAGITIS: ICD-10-CM

## 2025-02-24 DIAGNOSIS — R13.10 DYSPHAGIA, UNSPECIFIED TYPE: ICD-10-CM

## 2025-02-25 RX ORDER — OMEPRAZOLE 20 MG/1
20 CAPSULE, DELAYED RELEASE ORAL DAILY
Qty: 90 CAPSULE | Refills: 0 | Status: SHIPPED | OUTPATIENT
Start: 2025-02-25

## 2025-03-25 ENCOUNTER — TELEPHONE (OUTPATIENT)
Dept: FAMILY MEDICINE | Facility: CLINIC | Age: 80
End: 2025-03-25
Payer: COMMERCIAL

## 2025-03-25 ASSESSMENT — PATIENT HEALTH QUESTIONNAIRE - PHQ9
10. IF YOU CHECKED OFF ANY PROBLEMS, HOW DIFFICULT HAVE THESE PROBLEMS MADE IT FOR YOU TO DO YOUR WORK, TAKE CARE OF THINGS AT HOME, OR GET ALONG WITH OTHER PEOPLE: SOMEWHAT DIFFICULT
SUM OF ALL RESPONSES TO PHQ QUESTIONS 1-9: 18
SUM OF ALL RESPONSES TO PHQ QUESTIONS 1-9: 18

## 2025-03-25 NOTE — TELEPHONE ENCOUNTER
Patient calling to request appointment because he believes he may have a sinus infection.    He has history of sinus issues and would like in person appointment.    Has green yellow drainage, sinus pressure but no fever.    Scheduled with provider 3/26/25 to be evaluated.    Christine M Klisch, RN

## 2025-03-26 ENCOUNTER — OFFICE VISIT (OUTPATIENT)
Dept: FAMILY MEDICINE | Facility: CLINIC | Age: 80
End: 2025-03-26
Payer: COMMERCIAL

## 2025-03-26 VITALS
SYSTOLIC BLOOD PRESSURE: 138 MMHG | HEIGHT: 69 IN | OXYGEN SATURATION: 99 % | WEIGHT: 186 LBS | RESPIRATION RATE: 16 BRPM | HEART RATE: 86 BPM | TEMPERATURE: 97.7 F | DIASTOLIC BLOOD PRESSURE: 73 MMHG | BODY MASS INDEX: 27.55 KG/M2

## 2025-03-26 DIAGNOSIS — F33.1 MODERATE EPISODE OF RECURRENT MAJOR DEPRESSIVE DISORDER (H): ICD-10-CM

## 2025-03-26 DIAGNOSIS — J01.90 ACUTE RHINOSINUSITIS: Primary | ICD-10-CM

## 2025-03-26 PROCEDURE — 1126F AMNT PAIN NOTED NONE PRSNT: CPT | Performed by: STUDENT IN AN ORGANIZED HEALTH CARE EDUCATION/TRAINING PROGRAM

## 2025-03-26 PROCEDURE — 3078F DIAST BP <80 MM HG: CPT | Performed by: STUDENT IN AN ORGANIZED HEALTH CARE EDUCATION/TRAINING PROGRAM

## 2025-03-26 PROCEDURE — 3075F SYST BP GE 130 - 139MM HG: CPT | Performed by: STUDENT IN AN ORGANIZED HEALTH CARE EDUCATION/TRAINING PROGRAM

## 2025-03-26 PROCEDURE — 99214 OFFICE O/P EST MOD 30 MIN: CPT | Performed by: STUDENT IN AN ORGANIZED HEALTH CARE EDUCATION/TRAINING PROGRAM

## 2025-03-26 RX ORDER — AMOXICILLIN 875 MG/1
875 TABLET, COATED ORAL 2 TIMES DAILY
Qty: 10 TABLET | Refills: 0 | Status: SHIPPED | OUTPATIENT
Start: 2025-03-26 | End: 2025-03-31

## 2025-03-26 ASSESSMENT — PAIN SCALES - GENERAL: PAINLEVEL_OUTOF10: NO PAIN (0)

## 2025-03-26 NOTE — PATIENT INSTRUCTIONS
Garrett,    Thank you for allowing [facility] to manage your care today.    Our plan is as follows:    Start Amoxicillin 875 mg twice daily for 5 days   Schedule appointment with Allergy clinic  Follow-up if symptoms persist and/or worsen     I made a referral to Allergy clinic, they will be calling in approximately 1 week to set up your appointment. If you do not hear from them, please call the specialty number on your after visit.     If you have any questions or concerns, please use [employer] message and/or feel free to call us at [phone].    Your partner in health,    Dr. Cunningham      Did you know?    You can schedule a video visit for follow-up appointments as well as future appointments for certain conditions. Please see the below link.     [email or web address]    If you have not already done so, I encourage you to sign up for [employer] ([email or web address]). This will allow you to review your results, securely communicate with a provider, and schedule virtual visits as well.

## 2025-03-26 NOTE — PROGRESS NOTES
[facility] Note    Garrett Del Rio is a 79 year old male here for sinus issues.    Assessment & Plan     Acute rhinosinusitis  Acute on chronic issue. Patient presents with sinus symptoms past 2 weeks.  Has history of chronic sinus issues and recurrent infection.  Previously evaluated by ENT twice with no treatments recommended   Based on history and exam, diagnosis of acute on chronic rhinosinusitis   Suspect bacterial given prolonged symptom duration and sinus pain with increased purulent nasal drainage   Recommend the following:  Continue OTC therapies - Flonase and Zyrtec   Treat with course of Amoxicillin, refer to orders  Side effects of medications discussed.   Referral to allergy clinic for evaluation   Return precautions reviewed   After shared decision making, patient agreeable with plan   - REVIEW OF HEALTH MAINTENANCE PROTOCOL ORDERS  - amoxicillin (AMOXIL) 875 MG tablet; Take 1 tablet (875 mg) by mouth 2 times daily for 5 days.  - Adult Allergy/Asthma  Referral; Future    Moderate episode of recurrent major depressive disorder (H)  Stable with same PHQ9 score from prior. Denies SI.  Offered support and treatment  After shared decision making, patient declines today  Patient tried counseling before, did not help  Patient prefers not to take medication   Recommended follow-up if patient changes his mind         3/25/2025     1:49 PM   PHQ   PHQ-9 Total Score 18    Q9: Thoughts of better off dead/self-harm past 2 weeks Nearly every day   F/U: Thoughts of suicide or self-harm No   F/U: Safety concerns No       Patient-reported   Follow Up Actions Taken: refer to AP above       AVS provided to patient during office visit via hardcopy and/or MyChart.    Follow-up: Return if symptoms worsen or fail to improve.    Humphrey Cunningham MD 3/26/2025 9:42 AM    Note to patient: The 21st Century Cures Act makes medical notes like this available to patients in the interest of  "transparency. However, be advised this is a medical document. It is intended as lwop-tx-pirt communication. It is written in medical language and may contain abbreviations or verbiage that are unfamiliar. It may appear blunt or direct. Medical documents are intended to carry relevant information, facts as evident, and the clinical opinion of the practitioner.          Subjective   Garrett is a 79 year old, presenting for the following health issues:  Sinus Problem (/)        3/26/2025     9:04 AM   Additional Questions   Roomed by lorena garza ma   Accompanied by self         3/26/2025     9:04 AM   Patient Reported Additional Medications   Patient reports taking the following new medications none     History of Present Illness       Reason for visit:  Bad mucous    He eats 0-1 servings of fruits and vegetables daily.He consumes 0 sweetened beverage(s) daily.        #Sinus infection   Symptom onset past 2 weeks  Has green yellow drainage, sinus pressure and pain but no fevers  Chronic issue past several years  Thinks he has seasonal allergies, never tested  Seen by ENT before, nothing was done to help    #Depression  Mood has been like this for a while  There are too many problems in the world  Patient discussed [employer] background  Family support: daughter and wife   Works as , continues job for the kids  Denies SI         PHQ-9 score:        3/25/2025     1:49 PM   PHQ   PHQ-9 Total Score 18    Q9: Thoughts of better off dead/self-harm past 2 weeks Nearly every day   F/U: Thoughts of suicide or self-harm No   F/U: Safety concerns No       Patient-reported       Review of Systems: Please refer to HPI for pertinent positives and negatives.          Objective    /73 (BP Location: Right arm, Patient Position: Sitting, Cuff Size: Adult Large)   Pulse 86   Temp 97.7  F (36.5  C) (Oral)   Resp 16   Ht 1.753 m (5' 9\")   Wt 84.4 kg (186 lb)   SpO2 99%   BMI 27.47 kg/m    Body mass index is 27.47 " kg/m .    Physical Exam  Vitals reviewed.   Constitutional:       General: He is not in acute distress.     Appearance: Normal appearance. He is not ill-appearing or diaphoretic.   HENT:      Head: Normocephalic and atraumatic.      Nose: Congestion and rhinorrhea present. Rhinorrhea is purulent and bloody.      Right Sinus: Maxillary sinus tenderness and frontal sinus tenderness present.      Mouth/Throat:      Mouth: Mucous membranes are moist.      Pharynx: Oropharynx is clear.   Eyes:      Extraocular Movements: Extraocular movements intact.      Conjunctiva/sclera: Conjunctivae normal.      Pupils: Pupils are equal, round, and reactive to light.   Cardiovascular:      Rate and Rhythm: Normal rate and regular rhythm.      Heart sounds: Normal heart sounds.   Pulmonary:      Effort: Pulmonary effort is normal. No respiratory distress.      Breath sounds: Normal breath sounds. No wheezing, rhonchi or rales.   Skin:     General: Skin is warm and dry.   Neurological:      Mental Status: He is alert.   Psychiatric:         Mood and Affect: Mood normal.         Behavior: Behavior normal.            Signed Electronically by: Humphrey Cunningham MD

## 2025-04-19 DIAGNOSIS — R09.82 POSTNASAL DRIP: ICD-10-CM

## 2025-04-20 RX ORDER — FLUTICASONE PROPIONATE 50 MCG
2 SPRAY, SUSPENSION (ML) NASAL DAILY
Qty: 16 G | Refills: 0 | Status: SHIPPED | OUTPATIENT
Start: 2025-04-20

## 2025-05-11 ENCOUNTER — HEALTH MAINTENANCE LETTER (OUTPATIENT)
Age: 80
End: 2025-05-11

## 2025-05-12 ENCOUNTER — OFFICE VISIT (OUTPATIENT)
Dept: ALLERGY | Facility: CLINIC | Age: 80
End: 2025-05-12
Attending: STUDENT IN AN ORGANIZED HEALTH CARE EDUCATION/TRAINING PROGRAM
Payer: COMMERCIAL

## 2025-05-12 VITALS — HEART RATE: 84 BPM | DIASTOLIC BLOOD PRESSURE: 79 MMHG | OXYGEN SATURATION: 98 % | SYSTOLIC BLOOD PRESSURE: 158 MMHG

## 2025-05-12 DIAGNOSIS — R09.A2 GLOBUS SENSATION: ICD-10-CM

## 2025-05-12 DIAGNOSIS — J31.0 NONALLERGIC RHINITIS: Primary | ICD-10-CM

## 2025-05-12 DIAGNOSIS — T17.308A CHOKING, INITIAL ENCOUNTER: ICD-10-CM

## 2025-05-12 PROCEDURE — 3077F SYST BP >= 140 MM HG: CPT | Performed by: ALLERGY & IMMUNOLOGY

## 2025-05-12 PROCEDURE — 95004 PERQ TESTS W/ALRGNC XTRCS: CPT | Performed by: ALLERGY & IMMUNOLOGY

## 2025-05-12 PROCEDURE — 3078F DIAST BP <80 MM HG: CPT | Performed by: ALLERGY & IMMUNOLOGY

## 2025-05-12 PROCEDURE — 99203 OFFICE O/P NEW LOW 30 MIN: CPT | Mod: 25 | Performed by: ALLERGY & IMMUNOLOGY

## 2025-05-12 NOTE — PATIENT INSTRUCTIONS
If you have any questions regarding your allergies, asthma, or what we discussed during your visit today please call the allergy clinic or contact us via [employer]    [facility] Allergy RN Line: [phone] - call this number with any questions during or after business/clinic hours  [facility] Allergy Scheduling - Adult Patients: [phone]  [facility] Allergy Scheduling - Pediatric Patients: [phone]    All visits for food challenges, medication/drug allergy testing, and drug challenges MUST be scheduled through the allergy clinic nurse. Please call the nurse at [phone] or send a [employer] message for scheduling. Appointments for these visits that are made through the schedulers or via [employer] may be cancelled or rescheduled.    Clinic Schedule:   [place] - Monday, Tuesday, and Thursday  [address] [place] [address]    [facility] - Wednesday  [address] [place] [address]      Allergy testing is negative    Schedule an appointment with Dr. Marks for the throat symptoms    ENVIRONMENTAL PERCUTANEOUS SKIN TESTING: ADULT      5/12/2025    11:00 AM   [employer]   Consent Y   Ordering Physician Kelton   Interpreting Physician Kelton   Testing Technician Shae   Location Back   Time start: 11:45   Time End: 12:00   Positive Control: Histatrol*ALK 1 mg/ml 5/35   Negative Control: 50% Glycerin 0   Cat Hair*ALK (10,000 BAU/ml) 0   AP Dog Hair/Dander (1:100 w/v) 0   Dust Mite p. 30,000 AU/ml 0   Dust Mite f. (30,000 AU/ml) 0   Vidal (W/F in millimeters) 0   Glen Grass (100,000 BAU/mL) 0   Red Cedar (W/F in millimeters) 0   Maple/Mor[place] (W/F in millimeters) 0   Hackberry (W/F in millimeters) 0   [place] (W/F in millimeters) 0   [place] *ALK (W/F in millimeters) 0   American Elm (W/F in millimeters) 0   [place] (W/F in millimeters) 0   Black [place] (W/F in millimeters) 0   Birch Mix (W/F in millimeters) 0   [place] (W/F in  millimeters) 0   Oak (W/F in millimeters) 0   Cockle[place] (W/F in millimeters) 0   [place] (W/F in millimeters) 0   White [place] (W/F in millimeters) 0   Careless (W/F in millimeters) 0   Nettle (W/F in millimeters) 0   English Plantain (W/F in millimeters) 0   Kochia (W/F in millimeters) 0   Lamb's Quarter (W/F in millimeters) 0   Marshelder (W/F in millimeters) 0   Ragweed Mix* ALK (W/F in millimeters) 0   Russian Thistle (W/F in millimeters) 0   Sagebrush/Mugwort (W/F in millimeters) 0   Sheep Sorrel (W/F in millimeters) 0   Feather Mix* ALK (W/F in millimeters) 0   Penicillium Mix (1:10 w/v) 0   Curvularia spicifera (1:10 w/v) n/a   Epicoccum (1:10 w/v) 0   Aspergillus fumigatus (1:10 w/v): 0   Alternaria tenius (1:10 w/v) 0   H. Cladosporium (1:10 w/v) 0   Phoma herbarum (1:10 w/v) 0

## 2025-05-12 NOTE — PROGRESS NOTES
"Garrett Del Rio was seen in the Allergy Clinic at [facility].    Garrett Del Rio is a 79 year old White male being seen today at the request of Humphrey Cunningham MD in consultation for possible allergies.    Reports he has had increased mucus production x 2 years. Has both rhinorrhea and post-nasal drainage. Will frequently blow his nose and \"gets a lot of ugly stuff coming out of it.\" Historically the right side of his sinuses will flare up more frequently. The mucus will be yellow, green, or sometimes bloody. Both the right and left side of his nose are affected. Also has sticky mucus that gets stuck in the back of his throat. Sometimes chokes on it and it makes it difficult for him to take his pills. Has seen ENT twice but doesn't feel he has gotten any answers as to why he's having these symptoms.  Fluticasone has been the most effective in terms of medication. Takes 2 sprays in each nostril daily. Tried OTC antihistamines but these were not effective and he has since discontinued them.    Past Medical History:   Diagnosis Date    Kidney stone 11/10/2011    Diagnosed by ultrasound 10/24/11 3-4 mm     Lung nodule     MEDIAL MENISCUS TEAR - left 10/24/2011    Type 2 diabetes mellitus with hyperglycemia, without long-term current use of insulin (H) 5/25/2021     Family History   Problem Relation Age of Onset    Prostate Cancer Father     C.A.D. Father     Diabetes Paternal Grandfather     Hypertension No family hx of     Cancer No family hx of      Past Surgical History:   Procedure Laterality Date    CARDIAC STRESS TST,COMPLETE  2006    HC KNEE SCOPE,MED/LAT MENISECTOMY  11/11/11    left, with partial medial menisectomy ONLY    HERNIA REPAIR Right     [facility]: Nigel Poon D.O.       ENVIRONMENTAL HISTORY:   [place] The home is heated with a forced air. They do have central air conditioning. The patient's bedroom is furnished with carpeting in bedroom " and fabric window coverings.  Pets inside the house include 2 cat(s). There is no history of cockroach or mice infestation. Do you smoke cigarettes or other recreational drugs? No Do you vape or use an e-cigarette? No. There is/are 0 smokers living in the house. There is/are 0 who smoke ecigarettes/vape living in the house. The house does not have a damp basement.     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 He lives with his spouse.        Current Outpatient Medications:     Ascorbic Acid (VITAMIN C CR PO), Take  by mouth., Disp: , Rfl:     aspirin (ASA) 325 MG tablet, Take 650 mg by mouth every morning., Disp: , Rfl:     EPINEPHrine (ANY BX GENERIC EQUIV) 0.3 MG/0.3ML injection 2-pack, Inject 0.3 mLs (0.3 mg) into the muscle as needed for anaphylaxis, Disp: 2 each, Rfl: 3    fluticasone (FLONASE) 50 MCG/ACT nasal spray, Spray 2 sprays into both nostrils daily., Disp: 16 g, Rfl: 0    Multiple Minerals-Vitamins (CALCIUM-MAGNESIUM-ZINC-D3) TABS, Take 1 tablet by mouth 2 times daily, Disp: , Rfl:     Multiple Vitamin (MULTI-VITAMINS) TABS, Take 1 tablet by mouth daily, Disp: , Rfl:     omeprazole (PRILOSEC) 20 MG DR capsule, TAKE ONE CAPSULE BY MOUTH ONE TIME DAILY, Disp: 90 capsule, Rfl: 0    order for DME, CPAP 9 cm H20, Disp: 1 Units, Rfl: 0  Immunization History   Administered Date(s) Administered    COVID-19 12+ (Pfizer) 09/27/2023    COVID-19 Bivalent 12+ (Pfizer) 09/29/2022    COVID-19 MONOVALENT 12+ (Pfizer) 02/13/2021, 03/06/2021, 11/05/2021    COVID-19 Monovalent 12+ (Pfizer 2022) 04/19/2022    DT (PEDS <7y) 10/02/2000    Flu, Unspecified 09/15/2020, 08/01/2023    INFLUENZA,TRIVALENT (FLUCELVAX) 11/18/2013    Influenza (H1N1) 03/30/2010    Influenza (High Dose) Trivalent,PF (Fluzone) 10/12/2015, 11/23/2016, 11/03/2017, 10/24/2018, 10/31/2019, 10/25/2024    Influenza (IIV3) PF 11/01/2002, 11/17/2005, 09/26/2009, 10/16/2012, 11/18/2013, 09/24/2014    Influenza (prior to 2024)  10/16/2012    Influenza Vaccine 65+ (Fluzone HD) 09/15/2020, 09/24/2021, 10/28/2022, 09/25/2023    Influenza Vaccine >6 months,quad, PF 09/24/2014    Influenza, Split Virus, Trivalent, Pf (Fluzone\Fluarix) 09/26/2009    Pneumo Conj 13-V (2010&after) 10/12/2015, 07/19/2016, 08/01/2023    Pneumococcal 23 valent 01/01/2011, 02/09/2011, 08/01/2023    RSV (Abrysvo) 10/19/2023    TD,PF 7+ (Tenivac) 02/09/2011    TDAP (Adacel,Boostrix) 04/19/2023    TDAP Vaccine (Boostrix) 01/14/2013    Td (Adult), Adsorbed 10/02/2000    Tdap (Adult) Unspecified Formulation 10/02/2000    Tetanus 10/01/2000    Zoster recombinant adjuvanted (Shingrix) 05/16/2019, 08/06/2019    Zoster vaccine, live 10/16/2012, 01/01/2021     Allergies   Allergen Reactions    Septra [Sulfamethoxazole W-Trimethoprim]      Urinary symptoms, maybe not from bactrim    Ibuprofen Itching     Itchy hands, no rash         EXAM:   BP (!) 158/79 (BP Location: Right arm, Patient Position: Sitting, Cuff Size: Adult Regular)   Pulse 84   SpO2 98%   Physical Exam  Vitals and nursing note reviewed.   Constitutional:       Appearance: Normal appearance.   HENT:      Head: Normocephalic and atraumatic.      Right Ear: External ear normal.      Left Ear: External ear normal.      Nose: No mucosal edema, congestion or rhinorrhea.      Mouth/Throat:      Mouth: Mucous membranes are moist. No oral lesions.      Pharynx: Oropharynx is clear. Uvula midline. No posterior oropharyngeal erythema.   Eyes:      General: Lids are normal.      Extraocular Movements: Extraocular movements intact.      Conjunctiva/sclera: Conjunctivae normal.   Neck:      Comments: No asymmetry, masses, or scars  Cardiovascular:      Rate and Rhythm: Normal rate and regular rhythm.      Heart sounds: S1 normal and S2 normal. No murmur heard.  Pulmonary:      Effort: Pulmonary effort is normal. No respiratory distress.      Breath sounds: Normal breath sounds and air entry.   Musculoskeletal:      Comments:  No musculoskeletal defects noted   Skin:     General: Skin is warm and dry.      Findings: No lesion or rash.   Neurological:      General: No focal deficit present.      Mental Status: He is alert.   Psychiatric:         Mood and Affect: Mood and affect normal.           WORKUP: Skin testing  ENVIRONMENTAL PERCUTANEOUS SKIN TESTING: ADULT      5/12/2025    11:00 AM   [employer]   Consent Y   Ordering Physician Kelton   Interpreting Physician Kelton   Testing Technician Shae   [facility] Back   Time start: 11:45   Time End: 12:00   Positive Control: Histatrol*ALK 1 mg/ml 5/35   Negative Control: 50% Glycerin 0   Cat Hair*ALK (10,000 BAU/ml) 0   AP Dog Hair/Dander (1:100 w/v) 0   Dust Mite p. 30,000 AU/ml 0   Dust Mite f. (30,000 AU/ml) 0   [place]idal (W/F in millimeters) 0   Glen Grass (100,000 BAU/mL) 0   Red Cedar (W/F in millimeters) 0   Maple/[place] (W/F in millimeters) 0   Hackberry (W/F in millimeters) 0   [place] (W/F in millimeters) 0   [place] *ALK (W/F in millimeters) 0   American Elm (W/F in millimeters) 0   [place] (W/F in millimeters) 0   Black [place] (W/F in millimeters) 0   Birch Mix (W/F in millimeters) 0   [place] (W/F in millimeters) 0   Oak (W/F in millimeters) 0   [place] (W/F in millimeters) 0   [place] (W/F in millimeters) 0   White [place] (W/F in millimeters) 0   Careless (W/F in millimeters) 0   Nettle (W/F in millimeters) 0   English Plantain (W/F in millimeters) 0   Kochia (W/F in millimeters) 0   Lamb's Quarter (W/F in millimeters) 0   Marshelder (W/F in millimeters) 0   Ragweed Mix* ALK (W/F in millimeters) 0   Russian Thistle (W/F in millimeters) 0   Sagebrush/Mugwort (W/F in millimeters) 0   Sheep Sorrel (W/F in millimeters) 0   Feather Mix* ALK (W/F in millimeters) 0   Penicillium Mix (1:10 w/v) 0   Curvularia spicifera (1:10 w/v) n/a   Epicoccum (1:10 w/v) 0   Aspergillus fumigatus (1:10 w/v): 0   Alternaria tenius (1:10 w/v) 0   H. Cladosporium (1:10 w/v) 0   Phoma  herbarum (1:10 w/v) 0      Appropriate response to controls, all others negative    ASSESSMENT/PLAN:  Garrett Del Rio is a 79 year old male here for evaluation of possible allergies.    1. Nonallergic rhinitis (Primary) - Skin testing today is negative for evidence of aeroallergen sensitization. He has had improvement in rhinitis symptoms with fluticasone nasal spray and was advised to continue with this. Referral placed for laryngology to help with further evaluation and management of post-nasal drainage, globus sensation, and choking.    - continue fluticasone nasal spray - 2 sprays in each nostril daily  - ALLERGY SKIN TESTS,ALLERGENS    2. Globus sensation    - ALLERGY SKIN TESTS,ALLERGENS  - Adult ENT  Referral; Future    3. Choking, initial encounter    - ALLERGY SKIN TESTS,ALLERGENS  - Adult ENT  Referral; Future      Follow-up as needed      Thank you for allowing me to participate in the care of Garrett Del Rio.      Mary Melara MD, FAAAAI  Allergy/Immunology  [facility]    Consent was obtained from the patient to use an AI documentation tool in the creation of this note.    Chart documentation done in part with Dragon Voice Recognition Software. Although reviewed after completion, some word and grammatical errors may remain.

## 2025-05-12 NOTE — LETTER
"5/12/2025      Garrett Del Rio  [address]  [facility] [address]      Dear Colleague,    Thank you for referring your patient, Garrett Del Rio, to the [facility]. Please see a copy of my visit note below.    Garrett Del Rio was seen in the Allergy Clinic at [facility].    Garrett Del Rio is a 79 year old White male being seen today at the request of Humphrey Cunningham MD in consultation for possible allergies.    Reports he has had increased mucus production x 2 years. Has both rhinorrhea and post-nasal drainage. Will frequently blow his nose and \"gets a lot of ugly stuff coming out of it.\" Historically the right side of his sinuses will flare up more frequently. The mucus will be yellow, green, or sometimes bloody. Both the right and left side of his nose are affected. Also has sticky mucus that gets stuck in the back of his throat. Sometimes chokes on it and it makes it difficult for him to take his pills. Has seen ENT twice but doesn't feel he has gotten any answers as to why he's having these symptoms.  Fluticasone has been the most effective in terms of medication. Takes 2 sprays in each nostril daily. Tried OTC antihistamines but these were not effective and he has since discontinued them.    Past Medical History:   Diagnosis Date     Kidney stone 11/10/2011    Diagnosed by ultrasound 10/24/11 3-4 mm      Lung nodule      MEDIAL MENISCUS TEAR - left 10/24/2011     Type 2 diabetes mellitus with hyperglycemia, without long-term current use of insulin (H) 5/25/2021     Family History   Problem Relation Age of Onset     Prostate Cancer Father      C.A.D. Father      Diabetes Paternal Grandfather      Hypertension No family hx of      Cancer No family hx of      Past Surgical History:   Procedure Laterality Date     CARDIAC STRESS TST,COMPLETE  2006     HC KNEE SCOPE,MED/LAT MENISECTOMY  11/11/11    left, with partial medial menisectomy ONLY     HERNIA REPAIR Right     [facility]: " Nigel Poon D.O.       ENVIRONMENTAL HISTORY:   Garrett [place] The home is heated with a forced air. They do have central air conditioning. The patient's bedroom is furnished with carpeting in bedroom and fabric window coverings.  Pets inside the house include 2 cat(s). There is no history of cockroach or mice infestation. Do you smoke cigarettes or other recreational drugs? No Do you vape or use an e-cigarette? No. There is/are 0 smokers living in the house. There is/are 0 who smoke ecigarettes/vape living in the house. The house does not have a damp basement.     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 He lives with his spouse.        Current Outpatient Medications:      Ascorbic Acid (VITAMIN C CR PO), Take  by mouth., Disp: , Rfl:      aspirin (ASA) 325 MG tablet, Take 650 mg by mouth every morning., Disp: , Rfl:      EPINEPHrine (ANY BX GENERIC EQUIV) 0.3 MG/0.3ML injection 2-pack, Inject 0.3 mLs (0.3 mg) into the muscle as needed for anaphylaxis, Disp: 2 each, Rfl: 3     fluticasone (FLONASE) 50 MCG/ACT nasal spray, Spray 2 sprays into both nostrils daily., Disp: 16 g, Rfl: 0     Multiple Minerals-Vitamins (CALCIUM-MAGNESIUM-ZINC-D3) TABS, Take 1 tablet by mouth 2 times daily, Disp: , Rfl:      Multiple Vitamin (MULTI-VITAMINS) TABS, Take 1 tablet by mouth daily, Disp: , Rfl:      omeprazole (PRILOSEC) 20 MG DR capsule, TAKE ONE CAPSULE BY MOUTH ONE TIME DAILY, Disp: 90 capsule, Rfl: 0     order for DME, CPAP 9 cm H20, Disp: 1 Units, Rfl: 0  Immunization History   Administered Date(s) Administered     COVID-19 12+ (Pfizer) 09/27/2023     COVID-19 Bivalent 12+ (Pfizer) 09/29/2022     COVID-19 MONOVALENT 12+ (Pfizer) 02/13/2021, 03/06/2021, 11/05/2021     COVID-19 Monovalent 12+ (Pfizer 2022) 04/19/2022     DT (PEDS <7y) 10/02/2000     Flu, Unspecified 09/15/2020, 08/01/2023     INFLUENZA,TRIVALENT (FLUCELVAX) 11/18/2013     Influenza (H1N1)  03/30/2010     Influenza (High Dose) Trivalent,PF (Fluzone) 10/12/2015, 11/23/2016, 11/03/2017, 10/24/2018, 10/31/2019, 10/25/2024     Influenza (IIV3) PF 11/01/2002, 11/17/2005, 09/26/2009, 10/16/2012, 11/18/2013, 09/24/2014     Influenza (prior to 2024) 10/16/2012     Influenza Vaccine 65+ (Fluzone HD) 09/15/2020, 09/24/2021, 10/28/2022, 09/25/2023     Influenza Vaccine >6 months,quad, PF 09/24/2014     Influenza, Split Virus, Trivalent, Pf (Fluzone\Fluarix) 09/26/2009     Pneumo Conj 13-V (2010&after) 10/12/2015, 07/19/2016, 08/01/2023     Pneumococcal 23 valent 01/01/2011, 02/09/2011, 08/01/2023     RSV (Abrysvo) 10/19/2023     TD,PF 7+ (Tenivac) 02/09/2011     TDAP (Adacel,Boostrix) 04/19/2023     TDAP Vaccine (Boostrix) 01/14/2013     Td (Adult), Adsorbed 10/02/2000     Tdap (Adult) Unspecified Formulation 10/02/2000     Tetanus 10/01/2000     Zoster recombinant adjuvanted (Shingrix) 05/16/2019, 08/06/2019     Zoster vaccine, live 10/16/2012, 01/01/2021     Allergies   Allergen Reactions     Septra [Sulfamethoxazole W-Trimethoprim]      Urinary symptoms, maybe not from bactrim     Ibuprofen Itching     Itchy hands, no rash         EXAM:   BP (!) 158/79 (BP Location: Right arm, Patient Position: Sitting, Cuff Size: Adult Regular)   Pulse 84   SpO2 98%   Physical Exam  Vitals and nursing note reviewed.   Constitutional:       Appearance: Normal appearance.   HENT:      Head: Normocephalic and atraumatic.      Right Ear: External ear normal.      Left Ear: External ear normal.      Nose: No mucosal edema, congestion or rhinorrhea.      Mouth/Throat:      Mouth: Mucous membranes are moist. No oral lesions.      Pharynx: Oropharynx is clear. Uvula midline. No posterior oropharyngeal erythema.   Eyes:      General: Lids are normal.      Extraocular Movements: Extraocular movements intact.      Conjunctiva/sclera: Conjunctivae normal.   Neck:      Comments: No asymmetry, masses, or scars  Cardiovascular:      Rate  and Rhythm: Normal rate and regular rhythm.      Heart sounds: S1 normal and S2 normal. No murmur heard.  Pulmonary:      Effort: Pulmonary effort is normal. No respiratory distress.      Breath sounds: Normal breath sounds and air entry.   Musculoskeletal:      Comments: No musculoskeletal defects noted   Skin:     General: Skin is warm and dry.      Findings: No lesion or rash.   Neurological:      General: No focal deficit present.      Mental Status: He is alert.   Psychiatric:         Mood and Affect: Mood and affect normal.           WORKUP: Skin testing  ENVIRONMENTAL PERCUTANEOUS SKIN TESTING: ADULT      5/12/2025    11:00 AM   [facility]   Consent Y   Ordering Physician Kelton   Interpreting Physician Kelton   Testing Technician Shae   Location Back   Time start: 11:45   Time End: 12:00   Positive Control: Histatrol*ALK 1 mg/ml 5/35   Negative Control: 50% Glycerin 0   Cat Hair*ALK (10,000 BAU/ml) 0   AP Dog Hair/Dander (1:100 w/v) 0   Dust Mite p. 30,000 AU/ml 0   Dust Mite f. (30,000 AU/ml) 0   Vidal (W/F in millimeters) 0   Glen Grass (100,000 BAU/mL) 0   Red Cedar (W/F in millimeters) 0   Maple/Fairfield (W/F in millimeters) 0   Hackberry (W/F in millimeters) 0   [place] (W/F in millimeters) 0   [place] *ALK (W/F in millimeters) 0   American Elm (W/F in millimeters) 0   [place] (W/F in millimeters) 0   [place] (W/F in millimeters) 0   Birch Mix (W/F in millimeters) 0   [place] (W/F in millimeters) 0   Oak (W/F in millimeters) 0   Cocklebur (W/F in millimeters) 0   Sinai (W/F in millimeters) 0   White Lamont (W/F in millimeters) 0   Careless (W/F in millimeters) 0   Nettle (W/F in millimeters) 0   English Plantain (W/F in millimeters) 0   Kochia (W/F in millimeters) 0   Lamb's Quarter (W/F in millimeters) 0   Marshelder (W/F in millimeters) 0   Ragweed Mix* ALK (W/F in millimeters) 0   Russian Thistle (W/F in millimeters) 0   Sagebrush/Mugwort (W/F in millimeters) 0   Sheep  Sorrel (W/F in millimeters) 0   Feather Mix* ALK (W/F in millimeters) 0   Penicillium Mix (1:10 w/v) 0   Curvularia spicifera (1:10 w/v) n/a   Epicoccum (1:10 w/v) 0   Aspergillus fumigatus (1:10 w/v): 0   Alternaria tenius (1:10 w/v) 0   H. Cladosporium (1:10 w/v) 0   Phoma herbarum (1:10 w/v) 0      Appropriate response to controls, all others negative    ASSESSMENT/PLAN:  Garrett Del Rio is a 79 year old male here for evaluation of possible allergies.    1. Nonallergic rhinitis (Primary) - Skin testing today is negative for evidence of aeroallergen sensitization. He has had improvement in rhinitis symptoms with fluticasone nasal spray and was advised to continue with this. Referral placed for laryngology to help with further evaluation and management of post-nasal drainage, globus sensation, and choking.    - continue fluticasone nasal spray - 2 sprays in each nostril daily  - ALLERGY SKIN TESTS,ALLERGENS    2. Globus sensation    - ALLERGY SKIN TESTS,ALLERGENS  - Adult ENT  Referral; Future    3. Choking, initial encounter    - ALLERGY SKIN TESTS,ALLERGENS  - Adult ENT  Referral; Future      Follow-up as needed      Thank you for allowing me to participate in the care of Garrett Del Rio.      Mary Melara MD, FAAAAI  Allergy/Immunology  [facility]    Consent was obtained from the patient to use an AI documentation tool in the creation of this note.    Chart documentation done in part with Dragon Voice Recognition Software. Although reviewed after completion, some word and grammatical errors may remain.    Again, thank you for allowing me to participate in the care of your patient.        Sincerely,        Mary Melara MD    Electronically signed

## 2025-05-13 ENCOUNTER — PATIENT OUTREACH (OUTPATIENT)
Dept: CARE COORDINATION | Facility: CLINIC | Age: 80
End: 2025-05-13
Payer: COMMERCIAL

## 2025-05-23 DIAGNOSIS — R13.10 DYSPHAGIA, UNSPECIFIED TYPE: ICD-10-CM

## 2025-05-23 DIAGNOSIS — K21.9 GASTROESOPHAGEAL REFLUX DISEASE WITHOUT ESOPHAGITIS: ICD-10-CM

## 2025-05-26 RX ORDER — OMEPRAZOLE 20 MG/1
20 CAPSULE, DELAYED RELEASE ORAL DAILY
Qty: 90 CAPSULE | Refills: 0 | Status: SHIPPED | OUTPATIENT
Start: 2025-05-26

## 2025-06-24 ASSESSMENT — PATIENT HEALTH QUESTIONNAIRE - PHQ9
10. IF YOU CHECKED OFF ANY PROBLEMS, HOW DIFFICULT HAVE THESE PROBLEMS MADE IT FOR YOU TO DO YOUR WORK, TAKE CARE OF THINGS AT HOME, OR GET ALONG WITH OTHER PEOPLE: NOT DIFFICULT AT ALL
SUM OF ALL RESPONSES TO PHQ QUESTIONS 1-9: 18
SUM OF ALL RESPONSES TO PHQ QUESTIONS 1-9: 18

## 2025-06-25 ENCOUNTER — OFFICE VISIT (OUTPATIENT)
Dept: FAMILY MEDICINE | Facility: CLINIC | Age: 80
End: 2025-06-25
Payer: COMMERCIAL

## 2025-06-25 VITALS
BODY MASS INDEX: 26.96 KG/M2 | TEMPERATURE: 97.9 F | HEIGHT: 69 IN | SYSTOLIC BLOOD PRESSURE: 134 MMHG | WEIGHT: 182 LBS | OXYGEN SATURATION: 97 % | HEART RATE: 76 BPM | RESPIRATION RATE: 16 BRPM | DIASTOLIC BLOOD PRESSURE: 68 MMHG

## 2025-06-25 DIAGNOSIS — Z12.5 SCREENING FOR PROSTATE CANCER: ICD-10-CM

## 2025-06-25 DIAGNOSIS — F33.1 MODERATE EPISODE OF RECURRENT MAJOR DEPRESSIVE DISORDER (H): ICD-10-CM

## 2025-06-25 DIAGNOSIS — R09.82 POSTNASAL DRIP: ICD-10-CM

## 2025-06-25 DIAGNOSIS — G47.33 OSA (OBSTRUCTIVE SLEEP APNEA): ICD-10-CM

## 2025-06-25 DIAGNOSIS — R79.89 ABNORMAL TSH: ICD-10-CM

## 2025-06-25 DIAGNOSIS — Z80.42 FAMILY HISTORY OF PROSTATE CANCER: ICD-10-CM

## 2025-06-25 DIAGNOSIS — K21.9 GASTROESOPHAGEAL REFLUX DISEASE WITHOUT ESOPHAGITIS: ICD-10-CM

## 2025-06-25 DIAGNOSIS — I10 PRIMARY HYPERTENSION: ICD-10-CM

## 2025-06-25 DIAGNOSIS — Z78.9 ALLERGY HISTORY UNKNOWN: ICD-10-CM

## 2025-06-25 DIAGNOSIS — R13.10 DYSPHAGIA, UNSPECIFIED TYPE: ICD-10-CM

## 2025-06-25 DIAGNOSIS — E11.65 TYPE 2 DIABETES MELLITUS WITH HYPERGLYCEMIA, WITHOUT LONG-TERM CURRENT USE OF INSULIN (H): Primary | ICD-10-CM

## 2025-06-25 LAB
ANION GAP SERPL CALCULATED.3IONS-SCNC: 11 MMOL/L (ref 7–15)
BUN SERPL-MCNC: 8.2 MG/DL (ref 8–23)
CALCIUM SERPL-MCNC: 9.5 MG/DL (ref 8.8–10.4)
CHLORIDE SERPL-SCNC: 99 MMOL/L (ref 98–107)
CREAT SERPL-MCNC: 0.95 MG/DL (ref 0.67–1.17)
CREAT UR-MCNC: 108 MG/DL
EGFRCR SERPLBLD CKD-EPI 2021: 81 ML/MIN/1.73M2
EST. AVERAGE GLUCOSE BLD GHB EST-MCNC: 183 MG/DL
GLUCOSE SERPL-MCNC: 171 MG/DL (ref 70–99)
HBA1C MFR BLD: 8 % (ref 0–5.6)
HCO3 SERPL-SCNC: 26 MMOL/L (ref 22–29)
HOLD SPECIMEN: NORMAL
MICROALBUMIN UR-MCNC: <12 MG/L
MICROALBUMIN/CREAT UR: NORMAL MG/G{CREAT}
POTASSIUM SERPL-SCNC: 4.3 MMOL/L (ref 3.4–5.3)
SODIUM SERPL-SCNC: 136 MMOL/L (ref 135–145)
T4 FREE SERPL-MCNC: 0.93 NG/DL (ref 0.9–1.7)
TSH SERPL DL<=0.005 MIU/L-ACNC: 6.43 UIU/ML (ref 0.3–4.2)

## 2025-06-25 PROCEDURE — 3075F SYST BP GE 130 - 139MM HG: CPT | Performed by: STUDENT IN AN ORGANIZED HEALTH CARE EDUCATION/TRAINING PROGRAM

## 2025-06-25 PROCEDURE — 80048 BASIC METABOLIC PNL TOTAL CA: CPT | Performed by: STUDENT IN AN ORGANIZED HEALTH CARE EDUCATION/TRAINING PROGRAM

## 2025-06-25 PROCEDURE — 99214 OFFICE O/P EST MOD 30 MIN: CPT | Performed by: STUDENT IN AN ORGANIZED HEALTH CARE EDUCATION/TRAINING PROGRAM

## 2025-06-25 PROCEDURE — G2211 COMPLEX E/M VISIT ADD ON: HCPCS | Performed by: STUDENT IN AN ORGANIZED HEALTH CARE EDUCATION/TRAINING PROGRAM

## 2025-06-25 PROCEDURE — 84443 ASSAY THYROID STIM HORMONE: CPT | Performed by: STUDENT IN AN ORGANIZED HEALTH CARE EDUCATION/TRAINING PROGRAM

## 2025-06-25 PROCEDURE — 82570 ASSAY OF URINE CREATININE: CPT | Performed by: STUDENT IN AN ORGANIZED HEALTH CARE EDUCATION/TRAINING PROGRAM

## 2025-06-25 PROCEDURE — 96127 BRIEF EMOTIONAL/BEHAV ASSMT: CPT | Performed by: STUDENT IN AN ORGANIZED HEALTH CARE EDUCATION/TRAINING PROGRAM

## 2025-06-25 PROCEDURE — 36415 COLL VENOUS BLD VENIPUNCTURE: CPT | Performed by: STUDENT IN AN ORGANIZED HEALTH CARE EDUCATION/TRAINING PROGRAM

## 2025-06-25 PROCEDURE — 3078F DIAST BP <80 MM HG: CPT | Performed by: STUDENT IN AN ORGANIZED HEALTH CARE EDUCATION/TRAINING PROGRAM

## 2025-06-25 PROCEDURE — 3052F HG A1C>EQUAL 8.0%<EQUAL 9.0%: CPT | Performed by: STUDENT IN AN ORGANIZED HEALTH CARE EDUCATION/TRAINING PROGRAM

## 2025-06-25 PROCEDURE — 83036 HEMOGLOBIN GLYCOSYLATED A1C: CPT | Performed by: STUDENT IN AN ORGANIZED HEALTH CARE EDUCATION/TRAINING PROGRAM

## 2025-06-25 PROCEDURE — 82043 UR ALBUMIN QUANTITATIVE: CPT | Performed by: STUDENT IN AN ORGANIZED HEALTH CARE EDUCATION/TRAINING PROGRAM

## 2025-06-25 PROCEDURE — 84439 ASSAY OF FREE THYROXINE: CPT | Performed by: STUDENT IN AN ORGANIZED HEALTH CARE EDUCATION/TRAINING PROGRAM

## 2025-06-25 RX ORDER — EPINEPHRINE 0.3 MG/.3ML
0.3 INJECTION SUBCUTANEOUS PRN
Qty: 2 EACH | Refills: 3 | Status: SHIPPED | OUTPATIENT
Start: 2025-06-25

## 2025-06-25 RX ORDER — FLUTICASONE PROPIONATE 50 MCG
2 SPRAY, SUSPENSION (ML) NASAL DAILY
Qty: 16 G | Refills: 5 | Status: SHIPPED | OUTPATIENT
Start: 2025-06-25

## 2025-06-25 RX ORDER — OMEPRAZOLE 20 MG/1
20 CAPSULE, DELAYED RELEASE ORAL DAILY
Qty: 90 CAPSULE | Refills: 3 | Status: SHIPPED | OUTPATIENT
Start: 2025-06-25

## 2025-06-25 NOTE — PROGRESS NOTES
Assessment & Plan     Type 2 diabetes mellitus with hyperglycemia, without long-term current use of insulin (H)  Lab Results   Component Value Date    A1C 8.0 06/25/2025    A1C 8.5 01/22/2025    A1C 7.2 10/25/2024    A1C 6.8 07/15/2024    A1C 7.6 12/19/2023    A1C 6.7 04/16/2021    A1C 5.8 10/31/2019    A1C 5.4 11/07/2018    A1C 5.7 10/16/2015    A1C 5.3 09/24/2014     Diabetes has improved, his goal is <8% and is just on the border now so would recommend continue working on diabetes on management with diet alone, not on medications.   - HEMOGLOBIN A1C; Future  - Albumin Random Urine Quantitative with Creat Ratio; Future  - HEMOGLOBIN A1C    Primary hypertension  Declines medications for BP. BP has improved during his visit here.   - BASIC METABOLIC PANEL; Future    Allergy history unknown  Has remote home [place]   - EPINEPHrine (ANY BX GENERIC EQUIV) 0.3 MG/0.3ML injection 2-pack; Inject 0.3 mLs (0.3 mg) into the muscle as needed for anaphylaxis.    Abnormal TSH  - TSH with free T4 reflex; Future    Family history of prostate cancer  Screening for prostate cancer  - PROSTATE SPEC ANTIGEN SCREEN; Future    Gastroesophageal reflux disease without esophagitis  Dysphagia, unspecified type  Omeprazole at 20 mg has been helpful.   - omeprazole (PRILOSEC) 20 MG DR capsule; Take 1 capsule (20 mg) by mouth daily.    ADOLFO (obstructive sleep apnea)- moderate (AHI 21)  Uses CPAP consistently     Postnasal drip  Does use flonase regularly now and has noticed improvement in his post-nasal drip symptoms with this. Was supposed to see ENT for globus sensation as recommended by Dr Melara but he prefers not driving to the [facility]   - fluticasone (FLONASE) 50 MCG/ACT nasal spray; Spray 2 sprays into both nostrils daily.    Moderate episode of recurrent major depressive disorder (H)  Declines medications or therapy despite continued depression.   - TSH with free T4 reflex; Future      The longitudinal plan of care for the  diagnosis(es)/condition(s) as documented were addressed during this visit. Due to the added complexity in care, I will continue to support Garrett in the subsequent management and with ongoing continuity of care.      Depression Screening Follow Up        6/24/2025     5:00 PM   PHQ   PHQ-9 Total Score 18    Q9: Thoughts of better off dead/self-harm past 2 weeks Nearly every day   F/U: Thoughts of suicide or self-harm No   F/U: Safety concerns No       Patient-reported             Subjective   Garrett is a 79 year old, presenting for the following health issues:  Chronic Disease Management    History of Present Illness       Reason for visit:  Annual screening   He is taking medications regularly.          Started taking phosphatidyl  supplement for memory     Diabetes Follow-up    How often are you checking your blood sugar? Not at all  What concerns do you have today about your diabetes? None   Do you have any of these symptoms? (Select all that apply)  No numbness or tingling in feet.  No redness, sores or blisters on feet.  No complaints of excessive thirst.  No reports of blurry vision.  No significant changes to weight.  Have you had a diabetic eye exam in the last 12 months? Will have this done in two       Lab Results   Component Value Date    A1C 8.0 06/25/2025    A1C 8.5 01/22/2025    A1C 7.2 10/25/2024    A1C 6.8 07/15/2024    A1C 7.6 12/19/2023    A1C 6.7 04/16/2021    A1C 5.8 10/31/2019    A1C 5.4 11/07/2018    A1C 5.7 10/16/2015    A1C 5.3 09/24/2014             BP Readings from Last 2 Encounters:   06/25/25 134/68   05/12/25 (!) 158/79     Hemoglobin A1C (%)   Date Value   06/25/2025 8.0 (H)   01/22/2025 8.5 (H)   04/16/2021 6.7 (H)   10/31/2019 5.8 (H)     LDL Cholesterol Calculated   Date Value   10/25/2024      Comment:     Cannot estimate LDL when triglyceride exceeds 400 mg/dL   04/18/2023 59 mg/dL   04/16/2021     Cannot estimate LDL when triglyceride exceeds 400 mg/dL mg/dL   10/07/2019 62 mg/dL  "    LDL Cholesterol Direct (mg/dL)   Date Value   10/25/2024 63   04/21/2022 81   04/16/2021 85                 Not able to see Dr Marks, ENT, who he was referred to by Dr Melara due to globus sensation and postnasal drainage but he doesn't want to drive to the  or see another provider.       Doesn't eat very much. Doesn't have much of a smell so limited taste. Eats mostly soup and crackers and almonds and snacks. Doesn't like much greens. Sometimes salads.   Having some intermittent loose stools.      Consistently uses CPAP machine           Review of Systems  Constitutional, HEENT, cardiovascular, pulmonary, gi and gu systems are negative, except as otherwise noted.      Objective    /68   Pulse 76   Temp 97.9  F (36.6  C) (Oral)   Resp 16   Ht 1.753 m (5' 9\")   Wt 82.6 kg (182 lb)   SpO2 97%   BMI 26.88 kg/m    Body mass index is 26.88 kg/m .  Physical Exam   GENERAL: alert and no distress  EYES: Eyes grossly normal to inspection, PERRL and conjunctivae and sclerae normal  HENT: ear canals and TM's normal, nose and mouth without ulcers or lesions  NECK: no adenopathy, no asymmetry, masses, or scars  RESP: lungs clear to auscultation - no rales, rhonchi or wheezes  CV: regular rate and rhythm, normal S1 S2, no S3 or S4, no murmur, click or rub, no peripheral edema  ABDOMEN: soft, nontender, no hepatosplenomegaly, no masses and bowel sounds normal  MS: no gross musculoskeletal defects noted, no edema  SKIN: no suspicious lesions or rashes  NEURO: Normal strength and tone, mentation intact and speech normal  PSYCH: mentation appears normal, affect normal/bright    Recent Results (from the past 24 hours)   HEMOGLOBIN A1C   Result Value Ref Range    Estimated Average Glucose 183 (H) <117 mg/dL    Hemoglobin A1C 8.0 (H) 0.0 - 5.6 %   Extra Tube    Narrative    The following orders were created for panel order Extra Tube.  Procedure                               Abnormality         Status                   "   ---------                               -----------         ------                     Extra Red Top Tube[[id number]]                                                         Extra Green Top (Lithiu...[[id number]]                      In process                 Extra Purple Top Tube[[id number]]                                                        Please view results for these tests on the individual orders.           Signed Electronically by: Gabbi Gomez DO

## 2025-06-26 ENCOUNTER — RESULTS FOLLOW-UP (OUTPATIENT)
Dept: FAMILY MEDICINE | Facility: CLINIC | Age: 80
End: 2025-06-26

## 2025-08-15 ENCOUNTER — TRANSFERRED RECORDS (OUTPATIENT)
Dept: HEALTH INFORMATION MANAGEMENT | Facility: CLINIC | Age: 80
End: 2025-08-15
Payer: COMMERCIAL

## 2025-08-23 ENCOUNTER — OFFICE VISIT (OUTPATIENT)
Dept: URGENT CARE | Facility: URGENT CARE | Age: 80
End: 2025-08-23
Payer: COMMERCIAL

## 2025-08-23 VITALS
DIASTOLIC BLOOD PRESSURE: 76 MMHG | HEART RATE: 84 BPM | BODY MASS INDEX: 27.32 KG/M2 | WEIGHT: 185 LBS | TEMPERATURE: 97.9 F | OXYGEN SATURATION: 98 % | SYSTOLIC BLOOD PRESSURE: 149 MMHG

## 2025-08-23 DIAGNOSIS — R19.4 CHANGE IN STOOL HABITS: ICD-10-CM

## 2025-08-23 DIAGNOSIS — R35.0 URINARY FREQUENCY: ICD-10-CM

## 2025-08-23 DIAGNOSIS — R81 GLUCOSURIA: ICD-10-CM

## 2025-08-23 DIAGNOSIS — R10.84 ABDOMINAL PAIN, CHRONIC, GENERALIZED: Primary | ICD-10-CM

## 2025-08-23 DIAGNOSIS — G89.29 ABDOMINAL PAIN, CHRONIC, GENERALIZED: Primary | ICD-10-CM

## 2025-08-23 DIAGNOSIS — Z52.008 BLOOD DONOR: ICD-10-CM

## 2025-08-23 DIAGNOSIS — D69.6 THROMBOCYTOPENIA: ICD-10-CM

## 2025-08-23 DIAGNOSIS — D64.9 ANEMIA, UNSPECIFIED TYPE: ICD-10-CM

## 2025-08-23 LAB
ALBUMIN UR-MCNC: NEGATIVE MG/DL
APPEARANCE UR: CLEAR
BACTERIA #/AREA URNS HPF: ABNORMAL /HPF
BASOPHILS # BLD AUTO: <0.04 10E3/UL (ref 0–0.2)
BASOPHILS NFR BLD AUTO: 0.6 %
BILIRUB UR QL STRIP: NEGATIVE
COLOR UR AUTO: YELLOW
EOSINOPHIL # BLD AUTO: 0.13 10E3/UL (ref 0–0.7)
EOSINOPHIL NFR BLD AUTO: 2.6 %
ERYTHROCYTE [DISTWIDTH] IN BLOOD BY AUTOMATED COUNT: 13 % (ref 10–15)
GLUCOSE UR STRIP-MCNC: >=1000 MG/DL
HCT VFR BLD AUTO: 33.2 % (ref 40–53)
HGB BLD-MCNC: 11.3 G/DL (ref 13.3–17.7)
HGB UR QL STRIP: NEGATIVE
IMM GRANULOCYTES # BLD: <0.04 10E3/UL
IMM GRANULOCYTES NFR BLD: 0.2 %
KETONES UR STRIP-MCNC: NEGATIVE MG/DL
LEUKOCYTE ESTERASE UR QL STRIP: ABNORMAL
LYMPHOCYTES # BLD AUTO: 1.36 10E3/UL (ref 0.8–5.3)
LYMPHOCYTES NFR BLD AUTO: 27.5 %
MCH RBC QN AUTO: 28.8 PG (ref 26.5–33)
MCHC RBC AUTO-ENTMCNC: 34 G/DL (ref 31.5–36.5)
MCV RBC AUTO: 84.7 FL (ref 78–100)
MONOCYTES # BLD AUTO: 0.3 10E3/UL (ref 0–1.3)
MONOCYTES NFR BLD AUTO: 6.1 %
NEUTROPHILS # BLD AUTO: 3.12 10E3/UL (ref 1.6–8.3)
NEUTROPHILS NFR BLD AUTO: 63 %
NITRATE UR QL: NEGATIVE
PH UR STRIP: 6 [PH] (ref 5–8)
PLATELET # BLD AUTO: 132 10E3/UL (ref 150–450)
RBC # BLD AUTO: 3.92 10E6/UL (ref 4.4–5.9)
RBC #/AREA URNS AUTO: ABNORMAL /HPF
SP GR UR STRIP: 1.01 (ref 1–1.03)
SQUAMOUS #/AREA URNS AUTO: ABNORMAL /LPF
UROBILINOGEN UR STRIP-ACNC: 0.2 E.U./DL
WBC # BLD AUTO: 4.95 10E3/UL (ref 4–11)
WBC #/AREA URNS AUTO: ABNORMAL /HPF

## 2025-08-23 PROCEDURE — 82247 BILIRUBIN TOTAL: CPT

## 2025-08-23 PROCEDURE — 84155 ASSAY OF PROTEIN SERUM: CPT

## 2025-08-23 PROCEDURE — 87086 URINE CULTURE/COLONY COUNT: CPT

## 2025-08-23 PROCEDURE — 3077F SYST BP >= 140 MM HG: CPT

## 2025-08-23 PROCEDURE — 84075 ASSAY ALKALINE PHOSPHATASE: CPT

## 2025-08-23 PROCEDURE — 80048 BASIC METABOLIC PNL TOTAL CA: CPT

## 2025-08-23 PROCEDURE — 36415 COLL VENOUS BLD VENIPUNCTURE: CPT

## 2025-08-23 PROCEDURE — 85025 COMPLETE CBC W/AUTO DIFF WBC: CPT

## 2025-08-23 PROCEDURE — 99214 OFFICE O/P EST MOD 30 MIN: CPT

## 2025-08-23 PROCEDURE — 3078F DIAST BP <80 MM HG: CPT

## 2025-08-23 PROCEDURE — 81001 URINALYSIS AUTO W/SCOPE: CPT

## 2025-08-23 PROCEDURE — 82040 ASSAY OF SERUM ALBUMIN: CPT

## 2025-08-24 LAB
ALBUMIN SERPL BCG-MCNC: 4.3 G/DL (ref 3.5–5.2)
ALP SERPL-CCNC: 83 U/L (ref 40–150)
ALT SERPL W P-5'-P-CCNC: ABNORMAL U/L
ANION GAP SERPL CALCULATED.3IONS-SCNC: 14 MMOL/L (ref 7–15)
AST SERPL W P-5'-P-CCNC: ABNORMAL U/L
BILIRUB SERPL-MCNC: 0.6 MG/DL
BUN SERPL-MCNC: 9.2 MG/DL (ref 8–23)
CALCIUM SERPL-MCNC: 9.5 MG/DL (ref 8.8–10.4)
CHLORIDE SERPL-SCNC: 96 MMOL/L (ref 98–107)
CREAT SERPL-MCNC: 0.92 MG/DL (ref 0.67–1.17)
EGFRCR SERPLBLD CKD-EPI 2021: 85 ML/MIN/1.73M2
GLUCOSE SERPL-MCNC: 320 MG/DL (ref 70–99)
HCO3 SERPL-SCNC: 23 MMOL/L (ref 22–29)
POTASSIUM SERPL-SCNC: 4.6 MMOL/L (ref 3.4–5.3)
PROT SERPL-MCNC: 7.7 G/DL (ref 6.4–8.3)
SODIUM SERPL-SCNC: 133 MMOL/L (ref 135–145)

## 2025-08-25 ENCOUNTER — DOCUMENTATION ONLY (OUTPATIENT)
Dept: GASTROENTEROLOGY | Facility: CLINIC | Age: 80
End: 2025-08-25
Payer: COMMERCIAL

## 2025-08-25 LAB — BACTERIA UR CULT: NORMAL

## (undated) RX ORDER — ACETAMINOPHEN 325 MG/1
TABLET ORAL
Status: DISPENSED
Start: 2018-10-18